# Patient Record
Sex: FEMALE | Race: WHITE | Employment: OTHER | URBAN - METROPOLITAN AREA
[De-identification: names, ages, dates, MRNs, and addresses within clinical notes are randomized per-mention and may not be internally consistent; named-entity substitution may affect disease eponyms.]

---

## 2017-07-14 RX ORDER — ATORVASTATIN CALCIUM 40 MG/1
TABLET, FILM COATED ORAL
Qty: 30 TABLET | Refills: 0 | Status: ON HOLD | OUTPATIENT
Start: 2017-07-14 | End: 2020-04-29 | Stop reason: HOSPADM

## 2019-06-22 ENCOUNTER — HOSPITAL ENCOUNTER (EMERGENCY)
Age: 59
Discharge: HOME | End: 2019-06-22
Attending: EMERGENCY MEDICINE
Payer: COMMERCIAL

## 2019-06-22 ENCOUNTER — APPOINTMENT (OUTPATIENT)
Dept: CT IMAGING | Age: 59
End: 2019-06-22
Payer: COMMERCIAL

## 2019-06-22 VITALS
BODY MASS INDEX: 32.49 KG/M2 | SYSTOLIC BLOOD PRESSURE: 131 MMHG | DIASTOLIC BLOOD PRESSURE: 69 MMHG | HEART RATE: 73 BPM | TEMPERATURE: 98 F | WEIGHT: 220 LBS | RESPIRATION RATE: 18 BRPM | OXYGEN SATURATION: 98 %

## 2019-06-22 DIAGNOSIS — S39.012A STRAIN OF LUMBAR REGION, INITIAL ENCOUNTER: ICD-10-CM
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
## 2019-06-22 DIAGNOSIS — S32.040A CLOSED COMPRESSION FRACTURE OF FOURTH LUMBAR VERTEBRA, INITIAL ENCOUNTER: ICD-10-CM

## 2019-06-22 PROCEDURE — 72131 CT LUMBAR SPINE W/O DYE: CPT

## 2019-06-22 PROCEDURE — 6370000000 HC RX 637 (ALT 250 FOR IP): Performed by: EMERGENCY MEDICINE

## 2019-06-22 PROCEDURE — 99284 EMERGENCY DEPT VISIT MOD MDM: CPT

## 2019-06-22 RX ORDER — IBUPROFEN 400 MG/1
400 TABLET ORAL ONCE
Status: COMPLETED | OUTPATIENT
Start: 2019-06-22 | End: 2019-06-22

## 2019-06-22 RX ORDER — ACETAMINOPHEN 500 MG
1000 TABLET ORAL ONCE
Status: COMPLETED | OUTPATIENT
Start: 2019-06-22 | End: 2019-06-22

## 2019-06-22 RX ADMIN — IBUPROFEN 400 MG: 400 TABLET, FILM COATED ORAL at 11:39

## 2019-06-22 RX ADMIN — ACETAMINOPHEN 1000 MG: 500 TABLET ORAL at 11:38

## 2019-06-22 ASSESSMENT — PAIN SCALES - GENERAL
PAINLEVEL_OUTOF10: 8
PAINLEVEL_OUTOF10: 8

## 2019-06-22 ASSESSMENT — PAIN DESCRIPTION - LOCATION: LOCATION: BACK
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
## 2020-02-21 ENCOUNTER — APPOINTMENT (OUTPATIENT)
Dept: GENERAL RADIOLOGY | Age: 60
End: 2020-02-21
Payer: COMMERCIAL

## 2020-02-21 ENCOUNTER — HOSPITAL ENCOUNTER (EMERGENCY)
Age: 60
Discharge: HOME | End: 2020-02-21
Payer: COMMERCIAL

## 2020-02-21 VITALS
OXYGEN SATURATION: 100 % | RESPIRATION RATE: 16 BRPM | DIASTOLIC BLOOD PRESSURE: 68 MMHG | HEART RATE: 78 BPM | SYSTOLIC BLOOD PRESSURE: 122 MMHG | TEMPERATURE: 98 F

## 2020-02-21 LAB
BACTERIA: ABNORMAL /HPF
BILIRUBIN URINE: NEGATIVE
BLOOD, URINE: ABNORMAL
CLARITY: CLEAR
COLOR: YELLOW
EPITHELIAL CELLS, UA: ABNORMAL /HPF (ref 0–5)
GLUCOSE URINE: NEGATIVE MG/DL
KETONES, URINE: NEGATIVE MG/DL
LEUKOCYTE ESTERASE, URINE: ABNORMAL
MICROSCOPIC EXAMINATION: YES
NITRITE, URINE: NEGATIVE
PH UA: 6 (ref 5–8)
PROTEIN UA: NEGATIVE MG/DL
RBC UA: ABNORMAL /HPF (ref 0–4)
SPECIFIC GRAVITY UA: <=1.005 (ref 1–1.03)
URINE REFLEX TO CULTURE: YES
URINE TYPE: ABNORMAL
UROBILINOGEN, URINE: 0.2 E.U./DL
WBC UA: ABNORMAL /HPF (ref 0–5)

## 2020-02-21 PROCEDURE — 90471 IMMUNIZATION ADMIN: CPT | Performed by: NURSE PRACTITIONER

## 2020-02-21 PROCEDURE — 99283 EMERGENCY DEPT VISIT LOW MDM: CPT

## 2020-02-21 PROCEDURE — 81001 URINALYSIS AUTO W/SCOPE: CPT

## 2020-02-21 PROCEDURE — 90715 TDAP VACCINE 7 YRS/> IM: CPT | Performed by: NURSE PRACTITIONER

## 2020-02-21 PROCEDURE — 71101 X-RAY EXAM UNILAT RIBS/CHEST: CPT

## 2020-02-21 PROCEDURE — 87077 CULTURE AEROBIC IDENTIFY: CPT

## 2020-02-21 PROCEDURE — 6360000002 HC RX W HCPCS: Performed by: NURSE PRACTITIONER

## 2020-02-21 PROCEDURE — 87086 URINE CULTURE/COLONY COUNT: CPT

## 2020-02-21 PROCEDURE — 6370000000 HC RX 637 (ALT 250 FOR IP): Performed by: NURSE PRACTITIONER

## 2020-02-21 PROCEDURE — 73080 X-RAY EXAM OF ELBOW: CPT

## 2020-02-21 RX ORDER — NAPROXEN 500 MG/1
500 TABLET ORAL 2 TIMES DAILY
Qty: 20 TABLET | Refills: 0 | Status: SHIPPED | OUTPATIENT
Start: 2020-02-21 | End: 2020-03-27

## 2020-02-21 RX ORDER — METHOCARBAMOL 500 MG/1
500 TABLET, FILM COATED ORAL ONCE
Status: COMPLETED | OUTPATIENT
Start: 2020-02-21 | End: 2020-02-21

## 2020-02-21 RX ORDER — METHOCARBAMOL 500 MG/1
500 TABLET, FILM COATED ORAL 3 TIMES DAILY
Qty: 15 TABLET | Refills: 0 | Status: SHIPPED | OUTPATIENT
Start: 2020-02-21 | End: 2020-02-26

## 2020-02-21 RX ORDER — ACETAMINOPHEN 325 MG/1
650 TABLET ORAL ONCE
Status: COMPLETED | OUTPATIENT
Start: 2020-02-21 | End: 2020-02-21

## 2020-02-21 RX ADMIN — TETANUS TOXOID, REDUCED DIPHTHERIA TOXOID AND ACELLULAR PERTUSSIS VACCINE, ADSORBED 0.5 ML: 5; 2.5; 8; 8; 2.5 SUSPENSION INTRAMUSCULAR at 20:47

## 2020-02-21 RX ADMIN — ACETAMINOPHEN 650 MG: 325 TABLET ORAL at 19:50

## 2020-02-21 RX ADMIN — METHOCARBAMOL TABLETS 500 MG: 500 TABLET, COATED ORAL at 19:51

## 2020-02-21 ASSESSMENT — ENCOUNTER SYMPTOMS
ABDOMINAL PAIN: 0
COLOR CHANGE: 0
SHORTNESS OF BREATH: 0
SORE THROAT: 0
RHINORRHEA: 0

## 2020-02-21 ASSESSMENT — PAIN SCALES - GENERAL
PAINLEVEL_OUTOF10: 8

## 2020-02-22 LAB
ORGANISM: ABNORMAL
URINE CULTURE, ROUTINE: ABNORMAL
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
## 2020-03-25 NOTE — PROGRESS NOTES
to blood vessels or nerves; death, brain damage, or paralysis. I understand that if I have a Limitation of Treatment order in effect during my hospitalization, the order may or may not be in effect during this procedure. I give my doctor permission to give me blood or blood products. I understand that there are risks with receiving blood such as hepatitis, AIDS, fever, or allergic reaction. I acknowledge that the risks, benefits, and alternatives of this treatment have been explained to me and that no express or implied warranty has been given by the hospital, any blood bank, or any person or entity as to the blood or blood components transfused. At the discretion of my doctor, I agree to allow observers, equipment/product representatives and allow photographing, and/or televising of the procedure, provided my name or identity is maintained confidentially. I agree the hospital may dispose of or use for scientific or educational purposes any tissue, fluid, or body parts which may be removed.     ________________________________Date________Time______ am/pm  ([place] [facility])  Patient or Signature of Closest Relative or Legal Guardian    ________________________________Date________Time______am/pm      Page 1 of  1  Witness

## 2020-03-27 ENCOUNTER — HOSPITAL ENCOUNTER (OUTPATIENT)
Dept: PREADMISSION TESTING | Age: 60
Discharge: HOME | End: 2020-03-31
Payer: COMMERCIAL

## 2020-03-27 ENCOUNTER — HOSPITAL ENCOUNTER (OUTPATIENT)
Dept: GENERAL RADIOLOGY | Age: 60
Discharge: HOME | End: 2020-03-27
Payer: COMMERCIAL

## 2020-03-27 VITALS
HEIGHT: 69 IN | DIASTOLIC BLOOD PRESSURE: 73 MMHG | WEIGHT: 186 LBS | BODY MASS INDEX: 27.55 KG/M2 | TEMPERATURE: 98.1 F | SYSTOLIC BLOOD PRESSURE: 119 MMHG | RESPIRATION RATE: 16 BRPM | HEART RATE: 67 BPM | OXYGEN SATURATION: 98 %

## 2020-03-27 LAB
A/G RATIO: 1.2 (ref 1.1–2.2)
ABO/RH: NORMAL
ALBUMIN SERPL-MCNC: 3.9 G/DL (ref 3.4–5)
ALP BLD-CCNC: 159 U/L (ref 40–129)
ALT SERPL-CCNC: 22 U/L (ref 10–40)
ANION GAP SERPL CALCULATED.3IONS-SCNC: 13 MMOL/L (ref 3–16)
ANTIBODY SCREEN: NORMAL
AST SERPL-CCNC: 24 U/L (ref 15–37)
BILIRUB SERPL-MCNC: <0.2 MG/DL (ref 0–1)
BUN BLDV-MCNC: 16 MG/DL (ref 7–20)
CALCIUM SERPL-MCNC: 9.3 MG/DL (ref 8.3–10.6)
CHLORIDE BLD-SCNC: 99 MMOL/L (ref 99–110)
CO2: 23 MMOL/L (ref 21–32)
CREAT SERPL-MCNC: 0.5 MG/DL (ref 0.6–1.1)
GFR AFRICAN AMERICAN: >60
GFR NON-AFRICAN AMERICAN: >60
GLOBULIN: 3.2 G/DL
GLUCOSE BLD-MCNC: 155 MG/DL (ref 70–99)
HCT VFR BLD CALC: 39 % (ref 36–48)
HEMOGLOBIN: 13 G/DL (ref 12–16)
MCH RBC QN AUTO: 30 PG (ref 26–34)
MCHC RBC AUTO-ENTMCNC: 33.4 G/DL (ref 31–36)
MCV RBC AUTO: 89.9 FL (ref 80–100)
PDW BLD-RTO: 14.8 % (ref 12.4–15.4)
PLATELET # BLD: 310 K/UL (ref 135–450)
PMV BLD AUTO: 8.6 FL (ref 5–10.5)
POTASSIUM SERPL-SCNC: 4.3 MMOL/L (ref 3.5–5.1)
RBC # BLD: 4.33 M/UL (ref 4–5.2)
SODIUM BLD-SCNC: 135 MMOL/L (ref 136–145)
TOTAL PROTEIN: 7.1 G/DL (ref 6.4–8.2)
WBC # BLD: 7.7 K/UL (ref 4–11)

## 2020-03-27 PROCEDURE — 85027 COMPLETE CBC AUTOMATED: CPT

## 2020-03-27 PROCEDURE — 86900 BLOOD TYPING SEROLOGIC ABO: CPT

## 2020-03-27 PROCEDURE — 86901 BLOOD TYPING SEROLOGIC RH(D): CPT

## 2020-03-27 PROCEDURE — 80053 COMPREHEN METABOLIC PANEL: CPT

## 2020-03-27 PROCEDURE — 71046 X-RAY EXAM CHEST 2 VIEWS: CPT

## 2020-03-27 PROCEDURE — 86850 RBC ANTIBODY SCREEN: CPT

## 2020-03-27 NOTE — PROGRESS NOTES
your family will be given written instructions about your diet, activity, dressing care, medications, and return visits. 4. Once at home, should issues with nausea, pain, or bleeding occur, or should you notice any signs of infection, you should call your surgeon. 5. Always clean your hands before and after caring for your wound. Do not let your family touch your surgery site without cleaning their hands. 6. Narcotic pain medications can cause significant constipation. You may want to add a stool softener to your postoperative medication schedule or speak to your surgeon on how best to manage this SIDE EFFECT. SPECIAL INSTRUCTIONS     Thank you for allowing us to care for you. We strive to exceed your expectations in the overall delivery of care and service provided to you and your family. If you need to contact us for any reason, please call us at [phone]. Instructions reviewed and copy given to patient during preadmission testing visit. Lore Rowan. 3/27/2020 .10:06 AM      ADDITIONAL EDUCATIONAL INFORMATION REVIEWED / PROVIDED TO YOU AND YOUR FAMILY:  Yes Taking Control of Your Pain   Yes FAQs about Surgical Site Infections    No Cardiac Surgery Instructions for AM admission to the hospital  No Bactroban® Nasal Ointment Instructions for Cardiac Surgery  No Learning About Preventing Pressure Sores  No Cardiac Surgery Preoperative Hibiclens® Bathing Instructions  No Your Care after Heart Surgery Binder    No Navarro® Wipes Bathing Instructions (Obtained from:  [email or web address].[email or web address] pdf )  No Hibiclens® Bathing Instructions  Yes Antibacterial Soap

## 2020-03-30 ENCOUNTER — HOSPITAL ENCOUNTER (OUTPATIENT)
Dept: CT IMAGING | Age: 60
Discharge: HOME | End: 2020-03-30
Payer: COMMERCIAL

## 2020-03-30 PROCEDURE — 6360000004 HC RX CONTRAST MEDICATION: Performed by: OBSTETRICS & GYNECOLOGY

## 2020-03-30 PROCEDURE — 74177 CT ABD & PELVIS W/CONTRAST: CPT

## 2020-03-30 RX ORDER — CEFAZOLIN SODIUM 2 G/50ML
2 SOLUTION INTRAVENOUS ONCE
Status: CANCELLED | OUTPATIENT
Start: 2020-04-08 | End: 2020-03-30

## 2020-03-30 RX ADMIN — IOPAMIDOL 75 ML: 755 INJECTION, SOLUTION INTRAVENOUS at 08:02

## 2020-03-30 RX ADMIN — IOHEXOL 50 ML: 240 INJECTION, SOLUTION INTRATHECAL; INTRAVASCULAR; INTRAVENOUS; ORAL at 08:01

## 2020-04-07 ENCOUNTER — ANESTHESIA EVENT (OUTPATIENT)
Dept: OPERATING ROOM | Age: 60
DRG: 734 | End: 2020-04-07
Payer: COMMERCIAL

## 2020-04-08 ENCOUNTER — HOSPITAL ENCOUNTER (INPATIENT)
Age: 60
LOS: 7 days | Discharge: NURSING, REHAB OR LONG-TERM CARE | DRG: 734 | End: 2020-04-15
Attending: OBSTETRICS & GYNECOLOGY | Admitting: OBSTETRICS & GYNECOLOGY
Payer: COMMERCIAL

## 2020-04-08 ENCOUNTER — ANESTHESIA (OUTPATIENT)
Dept: OPERATING ROOM | Age: 60
DRG: 734 | End: 2020-04-08
Payer: COMMERCIAL

## 2020-04-08 VITALS — OXYGEN SATURATION: 100 % | DIASTOLIC BLOOD PRESSURE: 72 MMHG | TEMPERATURE: 95.2 F | SYSTOLIC BLOOD PRESSURE: 127 MMHG

## 2020-04-08 PROBLEM — C56.2 OVARIAN CANCER ON LEFT (HCC): Status: ACTIVE | Noted: 2020-04-08

## 2020-04-08 LAB
ABO/RH: NORMAL
ANTIBODY SCREEN: NORMAL

## 2020-04-08 PROCEDURE — 88304 TISSUE EXAM BY PATHOLOGIST: CPT

## 2020-04-08 PROCEDURE — 6360000002 HC RX W HCPCS: Performed by: OBSTETRICS & GYNECOLOGY

## 2020-04-08 PROCEDURE — 7100000000 HC PACU RECOVERY - FIRST 15 MIN: Performed by: OBSTETRICS & GYNECOLOGY

## 2020-04-08 PROCEDURE — C9113 INJ PANTOPRAZOLE SODIUM, VIA: HCPCS | Performed by: OBSTETRICS & GYNECOLOGY

## 2020-04-08 PROCEDURE — 88309 TISSUE EXAM BY PATHOLOGIST: CPT

## 2020-04-08 PROCEDURE — 2580000003 HC RX 258: Performed by: OBSTETRICS & GYNECOLOGY

## 2020-04-08 PROCEDURE — 2720000010 HC SURG SUPPLY STERILE: Performed by: OBSTETRICS & GYNECOLOGY

## 2020-04-08 PROCEDURE — 3700000000 HC ANESTHESIA ATTENDED CARE: Performed by: OBSTETRICS & GYNECOLOGY

## 2020-04-08 PROCEDURE — 88307 TISSUE EXAM BY PATHOLOGIST: CPT

## 2020-04-08 PROCEDURE — 0DBU0ZZ EXCISION OF OMENTUM, OPEN APPROACH: ICD-10-PCS | Performed by: OBSTETRICS & GYNECOLOGY

## 2020-04-08 PROCEDURE — 86900 BLOOD TYPING SEROLOGIC ABO: CPT

## 2020-04-08 PROCEDURE — 2500000003 HC RX 250 WO HCPCS: Performed by: ANESTHESIOLOGY

## 2020-04-08 PROCEDURE — 2580000003 HC RX 258: Performed by: ANESTHESIOLOGY

## 2020-04-08 PROCEDURE — 88342 IMHCHEM/IMCYTCHM 1ST ANTB: CPT

## 2020-04-08 PROCEDURE — 0UT20ZZ RESECTION OF BILATERAL OVARIES, OPEN APPROACH: ICD-10-PCS | Performed by: OBSTETRICS & GYNECOLOGY

## 2020-04-08 PROCEDURE — 88341 IMHCHEM/IMCYTCHM EA ADD ANTB: CPT

## 2020-04-08 PROCEDURE — 86850 RBC ANTIBODY SCREEN: CPT

## 2020-04-08 PROCEDURE — 0UB70ZZ EXCISION OF BILATERAL FALLOPIAN TUBES, OPEN APPROACH: ICD-10-PCS | Performed by: OBSTETRICS & GYNECOLOGY

## 2020-04-08 PROCEDURE — 6370000000 HC RX 637 (ALT 250 FOR IP): Performed by: OBSTETRICS & GYNECOLOGY

## 2020-04-08 PROCEDURE — 3600000019 HC SURGERY ROBOT ADDTL 15MIN: Performed by: OBSTETRICS & GYNECOLOGY

## 2020-04-08 PROCEDURE — 94770 HC ETCO2 MONITOR DAILY: CPT

## 2020-04-08 PROCEDURE — 0DBW0ZZ EXCISION OF PERITONEUM, OPEN APPROACH: ICD-10-PCS | Performed by: OBSTETRICS & GYNECOLOGY

## 2020-04-08 PROCEDURE — C9290 INJ, BUPIVACAINE LIPOSOME: HCPCS | Performed by: ANESTHESIOLOGY

## 2020-04-08 PROCEDURE — 88112 CYTOPATH CELL ENHANCE TECH: CPT

## 2020-04-08 PROCEDURE — 86901 BLOOD TYPING SEROLOGIC RH(D): CPT

## 2020-04-08 PROCEDURE — 3600000009 HC SURGERY ROBOT BASE: Performed by: OBSTETRICS & GYNECOLOGY

## 2020-04-08 PROCEDURE — 6360000002 HC RX W HCPCS: Performed by: NURSE ANESTHETIST, CERTIFIED REGISTERED

## 2020-04-08 PROCEDURE — C1765 ADHESION BARRIER: HCPCS | Performed by: OBSTETRICS & GYNECOLOGY

## 2020-04-08 PROCEDURE — 6360000002 HC RX W HCPCS: Performed by: ANESTHESIOLOGY

## 2020-04-08 PROCEDURE — 2709999900 HC NON-CHARGEABLE SUPPLY: Performed by: OBSTETRICS & GYNECOLOGY

## 2020-04-08 PROCEDURE — 0UT90ZZ RESECTION OF UTERUS, OPEN APPROACH: ICD-10-PCS | Performed by: OBSTETRICS & GYNECOLOGY

## 2020-04-08 PROCEDURE — 7100000001 HC PACU RECOVERY - ADDTL 15 MIN: Performed by: OBSTETRICS & GYNECOLOGY

## 2020-04-08 PROCEDURE — 3700000001 HC ADD 15 MINUTES (ANESTHESIA): Performed by: OBSTETRICS & GYNECOLOGY

## 2020-04-08 PROCEDURE — 1200000000 HC SEMI PRIVATE

## 2020-04-08 PROCEDURE — S2900 ROBOTIC SURGICAL SYSTEM: HCPCS | Performed by: OBSTETRICS & GYNECOLOGY

## 2020-04-08 PROCEDURE — 88305 TISSUE EXAM BY PATHOLOGIST: CPT

## 2020-04-08 PROCEDURE — 2500000003 HC RX 250 WO HCPCS: Performed by: NURSE ANESTHETIST, CERTIFIED REGISTERED

## 2020-04-08 PROCEDURE — 0WJG4ZZ INSPECTION OF PERITONEAL CAVITY, PERCUTANEOUS ENDOSCOPIC APPROACH: ICD-10-PCS | Performed by: OBSTETRICS & GYNECOLOGY

## 2020-04-08 PROCEDURE — 2500000003 HC RX 250 WO HCPCS: Performed by: OBSTETRICS & GYNECOLOGY

## 2020-04-08 PROCEDURE — 64488 TAP BLOCK BI INJECTION: CPT | Performed by: ANESTHESIOLOGY

## 2020-04-08 PROCEDURE — 0DTJ0ZZ RESECTION OF APPENDIX, OPEN APPROACH: ICD-10-PCS | Performed by: OBSTETRICS & GYNECOLOGY

## 2020-04-08 PROCEDURE — 07TC0ZZ RESECTION OF PELVIS LYMPHATIC, OPEN APPROACH: ICD-10-PCS | Performed by: OBSTETRICS & GYNECOLOGY

## 2020-04-08 PROCEDURE — 3E0T3BZ INTRODUCTION OF ANESTHETIC AGENT INTO PERIPHERAL NERVES AND PLEXI, PERCUTANEOUS APPROACH: ICD-10-PCS | Performed by: ANESTHESIOLOGY

## 2020-04-08 RX ORDER — MORPHINE SULFATE/PF 50 MG/50ML
PATIENT CONTROLLED ANALGESIA SYRINGE INTRAVENOUS CONTINUOUS
Status: DISCONTINUED | OUTPATIENT
Start: 2020-04-08 | End: 2020-04-10

## 2020-04-08 RX ORDER — MEPERIDINE HYDROCHLORIDE 25 MG/ML
12.5 INJECTION INTRAMUSCULAR; INTRAVENOUS; SUBCUTANEOUS EVERY 5 MIN PRN
Status: DISCONTINUED | OUTPATIENT
Start: 2020-04-08 | End: 2020-04-08 | Stop reason: HOSPADM

## 2020-04-08 RX ORDER — SUCCINYLCHOLINE/SOD CL,ISO/PF 200MG/10ML
SYRINGE (ML) INTRAVENOUS PRN
Status: DISCONTINUED | OUTPATIENT
Start: 2020-04-08 | End: 2020-04-08 | Stop reason: SDUPTHER

## 2020-04-08 RX ORDER — DIPHENHYDRAMINE HYDROCHLORIDE 50 MG/ML
12.5 INJECTION INTRAMUSCULAR; INTRAVENOUS EVERY 6 HOURS PRN
Status: DISCONTINUED | OUTPATIENT
Start: 2020-04-08 | End: 2020-04-15 | Stop reason: HOSPADM

## 2020-04-08 RX ORDER — CEFAZOLIN SODIUM 2 G/50ML
2 SOLUTION INTRAVENOUS ONCE
Status: COMPLETED | OUTPATIENT
Start: 2020-04-08 | End: 2020-04-08

## 2020-04-08 RX ORDER — ACETAMINOPHEN 325 MG/1
650 TABLET ORAL EVERY 4 HOURS PRN
Status: DISCONTINUED | OUTPATIENT
Start: 2020-04-08 | End: 2020-04-15 | Stop reason: HOSPADM

## 2020-04-08 RX ORDER — OXYCODONE HYDROCHLORIDE 5 MG/1
10 TABLET ORAL EVERY 4 HOURS PRN
Status: DISCONTINUED | OUTPATIENT
Start: 2020-04-08 | End: 2020-04-15 | Stop reason: HOSPADM

## 2020-04-08 RX ORDER — SODIUM CHLORIDE 0.9 % (FLUSH) 0.9 %
10 SYRINGE (ML) INJECTION PRN
Status: DISCONTINUED | OUTPATIENT
Start: 2020-04-08 | End: 2020-04-08 | Stop reason: HOSPADM

## 2020-04-08 RX ORDER — GABAPENTIN 100 MG/1
100 CAPSULE ORAL 3 TIMES DAILY
Status: DISCONTINUED | OUTPATIENT
Start: 2020-04-09 | End: 2020-04-15 | Stop reason: HOSPADM

## 2020-04-08 RX ORDER — 0.9 % SODIUM CHLORIDE 0.9 %
500 INTRAVENOUS SOLUTION INTRAVENOUS
Status: DISCONTINUED | OUTPATIENT
Start: 2020-04-08 | End: 2020-04-08 | Stop reason: HOSPADM

## 2020-04-08 RX ORDER — SODIUM CHLORIDE, SODIUM LACTATE, POTASSIUM CHLORIDE, CALCIUM CHLORIDE 600; 310; 30; 20 MG/100ML; MG/100ML; MG/100ML; MG/100ML
INJECTION, SOLUTION INTRAVENOUS CONTINUOUS
Status: DISCONTINUED | OUTPATIENT
Start: 2020-04-08 | End: 2020-04-08

## 2020-04-08 RX ORDER — ASPIRIN 81 MG/1
81 TABLET ORAL DAILY
Status: DISCONTINUED | OUTPATIENT
Start: 2020-04-09 | End: 2020-04-15 | Stop reason: HOSPADM

## 2020-04-08 RX ORDER — BUPIVACAINE HYDROCHLORIDE 5 MG/ML
INJECTION, SOLUTION EPIDURAL; INTRACAUDAL
Status: DISCONTINUED | OUTPATIENT
Start: 2020-04-08 | End: 2020-04-08 | Stop reason: SDUPTHER

## 2020-04-08 RX ORDER — HYDROCODONE BITARTRATE AND ACETAMINOPHEN 5; 325 MG/1; MG/1
1 TABLET ORAL
Status: DISCONTINUED | OUTPATIENT
Start: 2020-04-08 | End: 2020-04-08 | Stop reason: HOSPADM

## 2020-04-08 RX ORDER — BUPIVACAINE HYDROCHLORIDE 2.5 MG/ML
INJECTION, SOLUTION EPIDURAL; INFILTRATION; INTRACAUDAL PRN
Status: DISCONTINUED | OUTPATIENT
Start: 2020-04-08 | End: 2020-04-08 | Stop reason: HOSPADM

## 2020-04-08 RX ORDER — SODIUM CHLORIDE 9 MG/ML
INJECTION, SOLUTION INTRAVENOUS CONTINUOUS
Status: DISCONTINUED | OUTPATIENT
Start: 2020-04-08 | End: 2020-04-08

## 2020-04-08 RX ORDER — PANTOPRAZOLE SODIUM 40 MG/10ML
40 INJECTION, POWDER, LYOPHILIZED, FOR SOLUTION INTRAVENOUS DAILY
Status: DISCONTINUED | OUTPATIENT
Start: 2020-04-08 | End: 2020-04-11 | Stop reason: ALTCHOICE

## 2020-04-08 RX ORDER — METOCLOPRAMIDE HYDROCHLORIDE 5 MG/ML
5 INJECTION INTRAMUSCULAR; INTRAVENOUS EVERY 6 HOURS PRN
Status: DISCONTINUED | OUTPATIENT
Start: 2020-04-08 | End: 2020-04-15 | Stop reason: HOSPADM

## 2020-04-08 RX ORDER — GLYCOPYRROLATE 1 MG/5 ML
SYRINGE (ML) INTRAVENOUS PRN
Status: DISCONTINUED | OUTPATIENT
Start: 2020-04-08 | End: 2020-04-08 | Stop reason: SDUPTHER

## 2020-04-08 RX ORDER — PROCHLORPERAZINE EDISYLATE 5 MG/ML
5 INJECTION INTRAMUSCULAR; INTRAVENOUS
Status: COMPLETED | OUTPATIENT
Start: 2020-04-08 | End: 2020-04-08

## 2020-04-08 RX ORDER — SODIUM CHLORIDE 0.9 % (FLUSH) 0.9 %
10 SYRINGE (ML) INJECTION EVERY 12 HOURS SCHEDULED
Status: DISCONTINUED | OUTPATIENT
Start: 2020-04-08 | End: 2020-04-15 | Stop reason: HOSPADM

## 2020-04-08 RX ORDER — MORPHINE SULFATE 2 MG/ML
2 INJECTION, SOLUTION INTRAMUSCULAR; INTRAVENOUS
Status: DISCONTINUED | OUTPATIENT
Start: 2020-04-08 | End: 2020-04-15 | Stop reason: HOSPADM

## 2020-04-08 RX ORDER — NALOXONE HYDROCHLORIDE 0.4 MG/ML
0.4 INJECTION, SOLUTION INTRAMUSCULAR; INTRAVENOUS; SUBCUTANEOUS PRN
Status: DISCONTINUED | OUTPATIENT
Start: 2020-04-08 | End: 2020-04-10

## 2020-04-08 RX ORDER — CALCIUM CARBONATE 200(500)MG
500 TABLET,CHEWABLE ORAL 3 TIMES DAILY PRN
Status: DISCONTINUED | OUTPATIENT
Start: 2020-04-08 | End: 2020-04-15 | Stop reason: HOSPADM

## 2020-04-08 RX ORDER — OXYCODONE HYDROCHLORIDE 5 MG/1
5 TABLET ORAL EVERY 4 HOURS PRN
Status: DISCONTINUED | OUTPATIENT
Start: 2020-04-08 | End: 2020-04-15 | Stop reason: HOSPADM

## 2020-04-08 RX ORDER — PROPOFOL 10 MG/ML
INJECTION, EMULSION INTRAVENOUS PRN
Status: DISCONTINUED | OUTPATIENT
Start: 2020-04-08 | End: 2020-04-08 | Stop reason: SDUPTHER

## 2020-04-08 RX ORDER — SODIUM CHLORIDE 0.9 % (FLUSH) 0.9 %
10 SYRINGE (ML) INJECTION PRN
Status: DISCONTINUED | OUTPATIENT
Start: 2020-04-08 | End: 2020-04-15 | Stop reason: HOSPADM

## 2020-04-08 RX ORDER — FENTANYL CITRATE 50 UG/ML
INJECTION, SOLUTION INTRAMUSCULAR; INTRAVENOUS PRN
Status: DISCONTINUED | OUTPATIENT
Start: 2020-04-08 | End: 2020-04-08 | Stop reason: SDUPTHER

## 2020-04-08 RX ORDER — GABAPENTIN 100 MG/1
100 CAPSULE ORAL 3 TIMES DAILY
Status: ON HOLD | COMMUNITY
End: 2020-04-29 | Stop reason: SDUPTHER

## 2020-04-08 RX ORDER — WATER 1000 ML/1000ML
INJECTION, SOLUTION INTRAVENOUS PRN
Status: DISCONTINUED | OUTPATIENT
Start: 2020-04-08 | End: 2020-04-08 | Stop reason: HOSPADM

## 2020-04-08 RX ORDER — SENNA AND DOCUSATE SODIUM 50; 8.6 MG/1; MG/1
1 TABLET, FILM COATED ORAL 2 TIMES DAILY
Status: DISCONTINUED | OUTPATIENT
Start: 2020-04-08 | End: 2020-04-15 | Stop reason: HOSPADM

## 2020-04-08 RX ORDER — LIDOCAINE HYDROCHLORIDE 20 MG/ML
INJECTION, SOLUTION INTRAVENOUS PRN
Status: DISCONTINUED | OUTPATIENT
Start: 2020-04-08 | End: 2020-04-08 | Stop reason: SDUPTHER

## 2020-04-08 RX ORDER — MAGNESIUM HYDROXIDE 1200 MG/15ML
LIQUID ORAL CONTINUOUS PRN
Status: COMPLETED | OUTPATIENT
Start: 2020-04-08 | End: 2020-04-08

## 2020-04-08 RX ORDER — ONDANSETRON 2 MG/ML
4 INJECTION INTRAMUSCULAR; INTRAVENOUS EVERY 8 HOURS PRN
Status: DISCONTINUED | OUTPATIENT
Start: 2020-04-08 | End: 2020-04-15 | Stop reason: HOSPADM

## 2020-04-08 RX ORDER — HYDROMORPHONE HCL 110MG/55ML
PATIENT CONTROLLED ANALGESIA SYRINGE INTRAVENOUS PRN
Status: DISCONTINUED | OUTPATIENT
Start: 2020-04-08 | End: 2020-04-08 | Stop reason: SDUPTHER

## 2020-04-08 RX ORDER — MORPHINE SULFATE 2 MG/ML
4 INJECTION, SOLUTION INTRAMUSCULAR; INTRAVENOUS
Status: DISCONTINUED | OUTPATIENT
Start: 2020-04-08 | End: 2020-04-15 | Stop reason: HOSPADM

## 2020-04-08 RX ORDER — ONDANSETRON 2 MG/ML
4 INJECTION INTRAMUSCULAR; INTRAVENOUS
Status: DISCONTINUED | OUTPATIENT
Start: 2020-04-08 | End: 2020-04-08 | Stop reason: HOSPADM

## 2020-04-08 RX ORDER — HYDRALAZINE HYDROCHLORIDE 20 MG/ML
5 INJECTION INTRAMUSCULAR; INTRAVENOUS EVERY 10 MIN PRN
Status: DISCONTINUED | OUTPATIENT
Start: 2020-04-08 | End: 2020-04-08 | Stop reason: HOSPADM

## 2020-04-08 RX ORDER — SODIUM CHLORIDE 0.9 % (FLUSH) 0.9 %
10 SYRINGE (ML) INJECTION EVERY 12 HOURS SCHEDULED
Status: DISCONTINUED | OUTPATIENT
Start: 2020-04-08 | End: 2020-04-08 | Stop reason: HOSPADM

## 2020-04-08 RX ORDER — ROCURONIUM BROMIDE 10 MG/ML
INJECTION, SOLUTION INTRAVENOUS PRN
Status: DISCONTINUED | OUTPATIENT
Start: 2020-04-08 | End: 2020-04-08 | Stop reason: SDUPTHER

## 2020-04-08 RX ORDER — ATORVASTATIN CALCIUM 40 MG/1
40 TABLET, FILM COATED ORAL NIGHTLY
Status: DISCONTINUED | OUTPATIENT
Start: 2020-04-09 | End: 2020-04-15 | Stop reason: HOSPADM

## 2020-04-08 RX ORDER — NITROGLYCERIN 0.4 MG/1
0.4 TABLET SUBLINGUAL EVERY 5 MIN PRN
Status: DISCONTINUED | OUTPATIENT
Start: 2020-04-08 | End: 2020-04-15 | Stop reason: HOSPADM

## 2020-04-08 RX ORDER — ONDANSETRON 2 MG/ML
INJECTION INTRAMUSCULAR; INTRAVENOUS PRN
Status: DISCONTINUED | OUTPATIENT
Start: 2020-04-08 | End: 2020-04-08 | Stop reason: SDUPTHER

## 2020-04-08 RX ORDER — KETOROLAC TROMETHAMINE 30 MG/ML
15 INJECTION, SOLUTION INTRAMUSCULAR; INTRAVENOUS EVERY 6 HOURS
Status: COMPLETED | OUTPATIENT
Start: 2020-04-09 | End: 2020-04-10

## 2020-04-08 RX ORDER — DIPHENHYDRAMINE HYDROCHLORIDE 50 MG/ML
12.5 INJECTION INTRAMUSCULAR; INTRAVENOUS
Status: DISCONTINUED | OUTPATIENT
Start: 2020-04-08 | End: 2020-04-08 | Stop reason: HOSPADM

## 2020-04-08 RX ORDER — SODIUM CHLORIDE 9 MG/ML
INJECTION, SOLUTION INTRAVENOUS CONTINUOUS
Status: DISCONTINUED | OUTPATIENT
Start: 2020-04-08 | End: 2020-04-15 | Stop reason: HOSPADM

## 2020-04-08 RX ADMIN — BUPIVACAINE 20 ML: 13.3 INJECTION, SUSPENSION, LIPOSOMAL INFILTRATION at 18:30

## 2020-04-08 RX ADMIN — SUGAMMADEX 200 MG: 100 INJECTION, SOLUTION INTRAVENOUS at 17:59

## 2020-04-08 RX ADMIN — Medication 140 MG: at 13:17

## 2020-04-08 RX ADMIN — SODIUM CHLORIDE, SODIUM LACTATE, POTASSIUM CHLORIDE, AND CALCIUM CHLORIDE: 600; 310; 30; 20 INJECTION, SOLUTION INTRAVENOUS at 09:10

## 2020-04-08 RX ADMIN — CEFAZOLIN SODIUM 2 G: 2 SOLUTION INTRAVENOUS at 17:23

## 2020-04-08 RX ADMIN — FENTANYL CITRATE 50 MCG: 50 INJECTION INTRAMUSCULAR; INTRAVENOUS at 13:54

## 2020-04-08 RX ADMIN — SODIUM CHLORIDE, SODIUM LACTATE, POTASSIUM CHLORIDE, AND CALCIUM CHLORIDE: 600; 310; 30; 20 INJECTION, SOLUTION INTRAVENOUS at 15:38

## 2020-04-08 RX ADMIN — SODIUM CHLORIDE, SODIUM LACTATE, POTASSIUM CHLORIDE, AND CALCIUM CHLORIDE: 600; 310; 30; 20 INJECTION, SOLUTION INTRAVENOUS at 16:30

## 2020-04-08 RX ADMIN — ROCURONIUM BROMIDE 40 MG: 10 INJECTION, SOLUTION INTRAVENOUS at 13:54

## 2020-04-08 RX ADMIN — ROCURONIUM BROMIDE 5 MG: 10 INJECTION, SOLUTION INTRAVENOUS at 13:17

## 2020-04-08 RX ADMIN — CEFAZOLIN SODIUM 2 G: 2 SOLUTION INTRAVENOUS at 13:27

## 2020-04-08 RX ADMIN — PROPOFOL 150 MG: 10 INJECTION, EMULSION INTRAVENOUS at 13:17

## 2020-04-08 RX ADMIN — FENTANYL CITRATE 50 MCG: 50 INJECTION INTRAMUSCULAR; INTRAVENOUS at 15:04

## 2020-04-08 RX ADMIN — PANTOPRAZOLE SODIUM 40 MG: 40 INJECTION, POWDER, FOR SOLUTION INTRAVENOUS at 21:01

## 2020-04-08 RX ADMIN — ROCURONIUM BROMIDE 55 MG: 10 INJECTION, SOLUTION INTRAVENOUS at 13:34

## 2020-04-08 RX ADMIN — MORPHINE SULFATE 1 MG: 1 INJECTION, SOLUTION INTRAVENOUS at 19:06

## 2020-04-08 RX ADMIN — SODIUM CHLORIDE: 9 INJECTION, SOLUTION INTRAVENOUS at 18:56

## 2020-04-08 RX ADMIN — SODIUM CHLORIDE, SODIUM LACTATE, POTASSIUM CHLORIDE, AND CALCIUM CHLORIDE: 600; 310; 30; 20 INJECTION, SOLUTION INTRAVENOUS at 09:30

## 2020-04-08 RX ADMIN — HYDROMORPHONE HYDROCHLORIDE 0.5 MG: 1 INJECTION, SOLUTION INTRAMUSCULAR; INTRAVENOUS; SUBCUTANEOUS at 18:58

## 2020-04-08 RX ADMIN — HYDROMORPHONE HYDROCHLORIDE 0.5 MG: 1 INJECTION, SOLUTION INTRAMUSCULAR; INTRAVENOUS; SUBCUTANEOUS at 18:24

## 2020-04-08 RX ADMIN — Medication 0.2 MG: at 13:11

## 2020-04-08 RX ADMIN — FENTANYL CITRATE 50 MCG: 50 INJECTION INTRAMUSCULAR; INTRAVENOUS at 13:34

## 2020-04-08 RX ADMIN — FENTANYL CITRATE 50 MCG: 50 INJECTION INTRAMUSCULAR; INTRAVENOUS at 14:41

## 2020-04-08 RX ADMIN — SODIUM CHLORIDE, SODIUM LACTATE, POTASSIUM CHLORIDE, AND CALCIUM CHLORIDE: 600; 310; 30; 20 INJECTION, SOLUTION INTRAVENOUS at 14:02

## 2020-04-08 RX ADMIN — ROCURONIUM BROMIDE 30 MG: 10 INJECTION, SOLUTION INTRAVENOUS at 15:37

## 2020-04-08 RX ADMIN — HYDROMORPHONE HYDROCHLORIDE 1.5 MG: 2 INJECTION, SOLUTION INTRAMUSCULAR; INTRAVENOUS; SUBCUTANEOUS at 15:40

## 2020-04-08 RX ADMIN — Medication 10 ML: at 21:29

## 2020-04-08 RX ADMIN — FENTANYL CITRATE 50 MCG: 50 INJECTION INTRAMUSCULAR; INTRAVENOUS at 14:21

## 2020-04-08 RX ADMIN — HYDROMORPHONE HYDROCHLORIDE 0.5 MG: 2 INJECTION, SOLUTION INTRAMUSCULAR; INTRAVENOUS; SUBCUTANEOUS at 15:06

## 2020-04-08 RX ADMIN — FENTANYL CITRATE 50 MCG: 50 INJECTION INTRAMUSCULAR; INTRAVENOUS at 13:11

## 2020-04-08 RX ADMIN — SODIUM CHLORIDE: 9 INJECTION, SOLUTION INTRAVENOUS at 21:01

## 2020-04-08 RX ADMIN — LIDOCAINE HYDROCHLORIDE 80 MG: 20 INJECTION, SOLUTION INTRAVENOUS at 13:17

## 2020-04-08 RX ADMIN — SODIUM CHLORIDE, SODIUM LACTATE, POTASSIUM CHLORIDE, AND CALCIUM CHLORIDE: 600; 310; 30; 20 INJECTION, SOLUTION INTRAVENOUS at 14:31

## 2020-04-08 RX ADMIN — ONDANSETRON 4 MG: 2 INJECTION INTRAMUSCULAR; INTRAVENOUS at 17:02

## 2020-04-08 RX ADMIN — SENNOSIDES AND DOCUSATE SODIUM 1 TABLET: 8.6; 5 TABLET ORAL at 21:01

## 2020-04-08 RX ADMIN — PROCHLORPERAZINE EDISYLATE 5 MG: 5 INJECTION INTRAMUSCULAR; INTRAVENOUS at 18:55

## 2020-04-08 RX ADMIN — BUPIVACAINE HYDROCHLORIDE 30 ML: 5 INJECTION, SOLUTION EPIDURAL; INTRACAUDAL; PERINEURAL at 18:30

## 2020-04-08 ASSESSMENT — PULMONARY FUNCTION TESTS
PIF_VALUE: 18
PIF_VALUE: 22
PIF_VALUE: 23
PIF_VALUE: 20
PIF_VALUE: 20
PIF_VALUE: 23
PIF_VALUE: 20
PIF_VALUE: 18
PIF_VALUE: 19
PIF_VALUE: 23
PIF_VALUE: 19
PIF_VALUE: 23
PIF_VALUE: 23
PIF_VALUE: 28
PIF_VALUE: 23
PIF_VALUE: 28
PIF_VALUE: 19
PIF_VALUE: 22
PIF_VALUE: 19
PIF_VALUE: 19
PIF_VALUE: 21
PIF_VALUE: 19
PIF_VALUE: 16
PIF_VALUE: 28
PIF_VALUE: 29
PIF_VALUE: 19
PIF_VALUE: 23
PIF_VALUE: 23
PIF_VALUE: 22
PIF_VALUE: 20
PIF_VALUE: 22
PIF_VALUE: 28
PIF_VALUE: 19
PIF_VALUE: 23
PIF_VALUE: 19
PIF_VALUE: 22
PIF_VALUE: 22
PIF_VALUE: 20
PIF_VALUE: 20
PIF_VALUE: 19
PIF_VALUE: 23
PIF_VALUE: 19
PIF_VALUE: 23
PIF_VALUE: 22
PIF_VALUE: 27
PIF_VALUE: 23
PIF_VALUE: 19
PIF_VALUE: 17
PIF_VALUE: 0
PIF_VALUE: 17
PIF_VALUE: 20
PIF_VALUE: 13
PIF_VALUE: 22
PIF_VALUE: 19
PIF_VALUE: 19
PIF_VALUE: 0
PIF_VALUE: 19
PIF_VALUE: 19
PIF_VALUE: 20
PIF_VALUE: 28
PIF_VALUE: 17
PIF_VALUE: 19
PIF_VALUE: 17
PIF_VALUE: 19
PIF_VALUE: 22
PIF_VALUE: 19
PIF_VALUE: 23
PIF_VALUE: 19
PIF_VALUE: 28
PIF_VALUE: 23
PIF_VALUE: 19
PIF_VALUE: 16
PIF_VALUE: 23
PIF_VALUE: 22
PIF_VALUE: 1
PIF_VALUE: 2
PIF_VALUE: 21
PIF_VALUE: 22
PIF_VALUE: 21
PIF_VALUE: 19
PIF_VALUE: 23
PIF_VALUE: 17
PIF_VALUE: 19
PIF_VALUE: 19
PIF_VALUE: 22
PIF_VALUE: 19
PIF_VALUE: 21
PIF_VALUE: 19
PIF_VALUE: 22
PIF_VALUE: 23
PIF_VALUE: 19
PIF_VALUE: 22
PIF_VALUE: 3
PIF_VALUE: 23
PIF_VALUE: 27
PIF_VALUE: 22
PIF_VALUE: 21
PIF_VALUE: 22
PIF_VALUE: 2
PIF_VALUE: 19
PIF_VALUE: 23
PIF_VALUE: 20
PIF_VALUE: 0
PIF_VALUE: 23
PIF_VALUE: 19
PIF_VALUE: 19
PIF_VALUE: 18
PIF_VALUE: 19
PIF_VALUE: 22
PIF_VALUE: 16
PIF_VALUE: 19
PIF_VALUE: 23
PIF_VALUE: 2
PIF_VALUE: 19
PIF_VALUE: 20
PIF_VALUE: 23
PIF_VALUE: 19
PIF_VALUE: 23
PIF_VALUE: 19
PIF_VALUE: 17
PIF_VALUE: 19
PIF_VALUE: 20
PIF_VALUE: 22
PIF_VALUE: 21
PIF_VALUE: 19
PIF_VALUE: 19
PIF_VALUE: 20
PIF_VALUE: 19
PIF_VALUE: 20
PIF_VALUE: 22
PIF_VALUE: 23
PIF_VALUE: 22
PIF_VALUE: 22
PIF_VALUE: 23
PIF_VALUE: 1
PIF_VALUE: 22
PIF_VALUE: 19
PIF_VALUE: 22
PIF_VALUE: 23
PIF_VALUE: 2
PIF_VALUE: 20
PIF_VALUE: 23
PIF_VALUE: 19
PIF_VALUE: 22
PIF_VALUE: 23
PIF_VALUE: 22
PIF_VALUE: 20
PIF_VALUE: 23
PIF_VALUE: 18
PIF_VALUE: 26
PIF_VALUE: 2
PIF_VALUE: 20
PIF_VALUE: 19
PIF_VALUE: 0
PIF_VALUE: 22
PIF_VALUE: 20
PIF_VALUE: 23
PIF_VALUE: 19
PIF_VALUE: 0
PIF_VALUE: 2
PIF_VALUE: 23
PIF_VALUE: 2
PIF_VALUE: 21
PIF_VALUE: 19
PIF_VALUE: 23
PIF_VALUE: 19
PIF_VALUE: 19
PIF_VALUE: 20
PIF_VALUE: 19
PIF_VALUE: 22
PIF_VALUE: 21
PIF_VALUE: 19
PIF_VALUE: 22
PIF_VALUE: 19
PIF_VALUE: 23
PIF_VALUE: 19
PIF_VALUE: 22
PIF_VALUE: 20
PIF_VALUE: 22
PIF_VALUE: 22
PIF_VALUE: 19
PIF_VALUE: 18
PIF_VALUE: 19
PIF_VALUE: 23
PIF_VALUE: 20
PIF_VALUE: 22
PIF_VALUE: 19
PIF_VALUE: 23
PIF_VALUE: 23
PIF_VALUE: 22
PIF_VALUE: 21
PIF_VALUE: 20
PIF_VALUE: 20
PIF_VALUE: 22
PIF_VALUE: 23
PIF_VALUE: 21
PIF_VALUE: 19
PIF_VALUE: 23
PIF_VALUE: 19
PIF_VALUE: 19
PIF_VALUE: 23
PIF_VALUE: 19
PIF_VALUE: 19
PIF_VALUE: 23
PIF_VALUE: 27
PIF_VALUE: 21
PIF_VALUE: 21
PIF_VALUE: 22
PIF_VALUE: 3
PIF_VALUE: 28
PIF_VALUE: 24
PIF_VALUE: 18
PIF_VALUE: 19
PIF_VALUE: 19
PIF_VALUE: 23
PIF_VALUE: 19
PIF_VALUE: 20
PIF_VALUE: 19
PIF_VALUE: 18
PIF_VALUE: 18
PIF_VALUE: 19
PIF_VALUE: 23
PIF_VALUE: 19
PIF_VALUE: 1
PIF_VALUE: 2
PIF_VALUE: 17
PIF_VALUE: 23
PIF_VALUE: 20
PIF_VALUE: 19
PIF_VALUE: 21
PIF_VALUE: 22
PIF_VALUE: 23
PIF_VALUE: 22
PIF_VALUE: 21
PIF_VALUE: 26
PIF_VALUE: 23
PIF_VALUE: 23
PIF_VALUE: 18
PIF_VALUE: 23
PIF_VALUE: 22
PIF_VALUE: 23
PIF_VALUE: 19
PIF_VALUE: 23
PIF_VALUE: 23
PIF_VALUE: 19
PIF_VALUE: 19
PIF_VALUE: 23
PIF_VALUE: 22
PIF_VALUE: 23
PIF_VALUE: 19
PIF_VALUE: 2
PIF_VALUE: 23
PIF_VALUE: 23
PIF_VALUE: 19
PIF_VALUE: 26
PIF_VALUE: 22
PIF_VALUE: 22
PIF_VALUE: 20
PIF_VALUE: 3
PIF_VALUE: 18
PIF_VALUE: 1
PIF_VALUE: 19
PIF_VALUE: 23
PIF_VALUE: 22
PIF_VALUE: 23
PIF_VALUE: 19
PIF_VALUE: 19
PIF_VALUE: 28
PIF_VALUE: 24
PIF_VALUE: 22
PIF_VALUE: 18
PIF_VALUE: 19
PIF_VALUE: 23
PIF_VALUE: 19
PIF_VALUE: 21
PIF_VALUE: 18
PIF_VALUE: 18
PIF_VALUE: 19
PIF_VALUE: 20

## 2020-04-08 ASSESSMENT — PAIN SCALES - GENERAL
PAINLEVEL_OUTOF10: 5
PAINLEVEL_OUTOF10: 8

## 2020-04-08 ASSESSMENT — PAIN - FUNCTIONAL ASSESSMENT: PAIN_FUNCTIONAL_ASSESSMENT: 0-10

## 2020-04-08 ASSESSMENT — COPD QUESTIONNAIRES: CAT_SEVERITY: MODERATE

## 2020-04-08 ASSESSMENT — LIFESTYLE VARIABLES: SMOKING_STATUS: 1

## 2020-04-08 NOTE — H&P
depression, No anxiety, Concentration normal.  Endocrine:  No polyuria, No polydipsia, No hot flashes, No thyroid symptoms. Hematologic:  No epistaxis, No gingival bleeding, No petechiae, No ecchymosis. Lymphatic:  No lymphadenopathy, No lymphedema. Allergy / Immunologic:  No eczema, No frequent mucous infections, No frequent respiratory infections, No recurrent urticarial, No frequent skin infections. Vital Signs  /73   Pulse 75   Temp 97.9 °F (36.6 °C) (Oral)   Resp 14   Ht 5' 9\" (1.753 m)   Wt 184 lb (83.5 kg)   SpO2 94%   BMI 27.17 kg/m²     Physical Exam  CONSTITUTIONAL: awake, alert, cooperative, no apparent distress   EYES: pupils equal, round and reactive to light, sclera clear and conjunctiva normal  ENT: Normocephalic, without obvious abnormality, atramatic  NECK: supple, symmetrical, no jugular venous distension and no carotid bruits   HEMATOLOGIC/LYMPHATIC: no cervical, supraclavicular or axillary lymphadenopathy   LUNGS: no increased work of breathing and clear to auscultation   CARDIOVASCULAR: regular rate and rhythm, normal S1 and S2, no murmur noted  ABDOMEN: normal bowel sounds x 4, soft, non-distended, non-tender, no masses palpated, no hepatosplenomgally   MUSCULOSKELETAL: full range of motion noted, tone is normal  NEUROLOGIC: awake, alert, oriented to name, place and time. Motor skills grossly intact. SKIN: Normal skin color, texture, turgor and no jaundice. appears intact   EXTREMITIES: no LE edema   GYNECOLOGIC: Bimanual exam done. Smooth vulva and vagina. Small, smooth cervix. Mobile uterus. Fullness in the right adnexa. mild pain on deep palpation. RECTAL: No rectal masses. No parametrial masses. Normal tone. Imaging  3/16/20: Pelvic U/S > uterus measuring 10.82 x 7.5 x 6.61 cm. ES is 1.2cm. RO measuring 9.56 x 8.74 x 5.97 cm with an enlarged solid and cystic and vascular cyst.. LO measuring 6 x 5.2 x 7cm with an enlarged and solid mass.      3/30/2020: CT C/A/P:

## 2020-04-08 NOTE — ANESTHESIA PRE PROCEDURE
2300                                                      BMI:   Wt Readings from Last 3 Encounters:   03/27/20 186 lb (84.4 kg)   06/22/19 220 lb (99.8 kg)   09/12/17 215 lb (97.5 kg)     There is no height or weight on file to calculate BMI.    CBC:   Lab Results   Component Value Date    WBC 7.7 03/27/2020    RBC 4.33 03/27/2020    HGB 13.0 03/27/2020    HCT 39.0 03/27/2020    MCV 89.9 03/27/2020    RDW 14.8 03/27/2020     03/27/2020       CMP:   Lab Results   Component Value Date     03/27/2020    K 4.3 03/27/2020    CL 99 03/27/2020    CO2 23 03/27/2020    BUN 16 03/27/2020    CREATININE 0.5 03/27/2020    GFRAA >60 03/27/2020    GFRAA >60 11/19/2012    AGRATIO 1.2 03/27/2020    LABGLOM >60 03/27/2020    GLUCOSE 155 03/27/2020    PROT 7.1 03/27/2020    PROT 7.1 11/19/2012    CALCIUM 9.3 03/27/2020    BILITOT <0.2 03/27/2020    ALKPHOS 159 03/27/2020    AST 24 03/27/2020    ALT 22 03/27/2020       POC Tests: No results for input(s): POCGLU, POCNA, POCK, POCCL, POCBUN, POCHEMO, POCHCT in the last 72 hours. Coags: No results found for: PROTIME, INR, APTT    HCG (If Applicable): No results found for: PREGTESTUR, PREGSERUM, HCG, HCGQUANT     ABGs: No results found for: PHART, PO2ART, MDN9SUT, ATE4RAS, BEART, Q4RKMIJR     Type & Screen (If Applicable):  No results found for: LABABO, [address]    Anesthesia Evaluation  Patient summary reviewed and Nursing notes reviewed no history of anesthetic complications:   Airway: Mallampati: II  TM distance: >3 FB   Neck ROM: full  Mouth opening: > = 3 FB Dental: normal exam         Pulmonary: breath sounds clear to auscultation  (+) COPD: moderate,  current smoker (1 ppd since age 12  quit few days ago )          Patient did not smoke on day of surgery.                  Cardiovascular:  Exercise tolerance: good (>4 METS),   (+) past MI (2008  followed by stent 2008 and stent 2009): no interval change, CAD:,     (-)  angina    NYHA Classification: II  ECG

## 2020-04-08 NOTE — OP NOTE
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) [C56.1]    Post-Op Diagnosis: Same       Procedure(s):  DIAGNOSTIC LAPAROSCOPY,; LAPAROTOMY RESECTION OF A PELVIC MASS, COMPLETE HYSTERECTOMY, BILATERAL SALPINGO-OOPHORECTOMY, PELVIC LYMPHADENECTOMY,  OMENTECTOMY, APPENDECTOMY; RADICAL PARAMETRECTOMY; TUMOR DEBULKING, PLACEMENT OF NEGATIVE WOUND VAC STERILE DRESSING (SURFACE AREA > 50CM)    Surgeon(s):  Ann Gardner DO    Assistant:   Surgical Assistant: Eric Russo;  Davon Champagne    Anesthesia: General    Estimated Blood Loss (mL): 200     Complications: None    Specimens:   ID Type Source Tests Collected by Time Destination   1 : ASCITES Body Fluid Ascitic Fluid CYTOLOGY, NON-GYN [facility],  4/8/2020 1406    2 : ASCITIES Body Fluid Ascitic Fluid CYTOLOGY, NON-GYN [facility],  4/8/2020 1429    A : LEFT PERICOLIC GUTTER Tissue Tissue SURGICAL PATHOLOGY [facility],  4/8/2020 1414    B : LEFT TUBE AND OVARY Tissue Tissue SURGICAL PATHOLOGY [facility],  4/8/2020 1435    C : RIGHT TUBE AND OVARY Tissue Tissue SURGICAL PATHOLOGY [facility],  4/8/2020 1451    D : UTERUS AND CERVIX Tissue Tissue SURGICAL PATHOLOGY [facility],  4/8/2020 1528    E : POSTERIOR PERITONEUM Tissue Tissue SURGICAL PATHOLOGY [facility],  4/8/2020 1542    F : RIGHT PELVIC SIDEWALL Tissue Tissue SURGICAL PATHOLOGY [facility],  4/8/2020 1546    G : LEFT PELVIC LYMPH NODES Tissue Tissue SURGICAL PATHOLOGY [facility],  4/8/2020 1553    H : RIGHT PELVIC LYMPH NODES Tissue Tissue SURGICAL PATHOLOGY [facility],  4/8/2020 1557    I : APPENDIX Tissue Tissue SURGICAL PATHOLOGY [facility],  4/8/2020 1612    J : OMENTUM Tissue Tissue SURGICAL PATHOLOGY [facility],  4/8/2020 1619    K : RIGHT DIAPHRAGM PERITONEUM Tissue Tissue SURGICAL PATHOLOGY [facility],  4/8/2020 The rectosigmoid colon was then clamped and air inserted from the anus into the rectum as water was placed in the pelvis. No air leak noted. No obvious rectosigmoid defect noted. The paracolic gutter peritoneum was then resected where tumor nodules were seen. The bilateral pelvic lymph nodes were then removed with the margins of resection being the genitofemoral nerve laterally, the ureter medially, midportion common iliac artery superiorly and distal circumflex iliac vein inferiorly and the obturator nerve inferiorly. Attention was then taken to the para-aortic lymph nodes. Nodes could not be palpated and so no klever dissection done. The appendix had previously been freed from it's pelvic and right sidewall adhesions. Because of the proximity to the mass and adhesions, decision was made to remove the appendix. The peritoneum under the appendix was seen and a small window created. The base of the appendix was doubly clamped with hemostat clamps. The appendiceal artery was identified and clamped and cauterized with the ligasure. The appendix was then dissected off with a scalpel between the clamps and handed off the field. The base was then suture ligated with a 20 silk suture and then ligated with a 2-0 silk tie. The Bookwalter retractor was then repositioned and attention was turned to the upper abdomen. The infracolic and supracolic omentum were carefully dissected off the transverse colon sharply and with assistance from the bovie cautery. The spleno-colic, ye-colic and gastro-colic ligaments were then dissected. The rest of the omentum was then dissected off the greater curvature of the stomach with a ligasure. The total omentectomy was then completed with dissection of the hepato-colic ligaments. The right diaphragm peritoneum was then grasped with an allis clamp. The peritoneum along the right diaphragm was then stripped with the majority of the tumor resected with the radical parametrectomy.  The

## 2020-04-08 NOTE — ANESTHESIA POSTPROCEDURE EVALUATION
Department of Anesthesiology  Postprocedure Note    Patient: Luis Rollins  MRN: [id number]  YOB: 1960  Date of evaluation: 4/8/2020  Time:  6:59 PM     Procedure Summary     Date:  04/08/20 Room / Location:  [address] [place] [address]    Anesthesia Start:  5015 Anesthesia Stop:  0833    Procedure:  DIAGNOSTIC LAPAROSCOPY,; LAPAROTOMY RESECTION OF A PELVIC MASS, COMPLETE HYSTERECTOMY, BILATERAL SALPINGO-OOPHORECTOMY, PELVIC LYMPHADENECTOMY,  OMENTECTOMY, APPENDECTOMY; RADICAL PERIMETRECTOMY; TUMOR DEBULKING (Bilateral Abdomen) Diagnosis:       Malignant neoplasm of right ovary (HCC)      (Malignant neoplasm of right ovary ([facility] [address] [C56.1])    Surgeon:  Dell Lau DO Responsible Provider:  Esperanza Sidhu DO    Anesthesia Type:  general ASA Status:  4          Anesthesia Type: general    Gallo Phase I: Gallo Score: 8    Gallo Phase II:      Last vitals: Reviewed and per EMR flowsheets.        Anesthesia Post Evaluation    Patient location during evaluation: PACU  Patient participation: complete - patient participated  Level of consciousness: awake and alert  Pain score: 7  Airway patency: patent  Nausea & Vomiting: no nausea and no vomiting  Complications: no  Cardiovascular status: hemodynamically stable  Respiratory status: acceptable  Hydration status: euvolemic

## 2020-04-08 NOTE — PROGRESS NOTES
Pt arrived form OR, s/p DIAGNOSTIC LAPAROSCOPY,; LAPAROTOMY RESECTION OF A PELVIC MASS, COMPLETE HYSTERECTOMY, BILATERAL SALPINGO-OOPHORECTOMY, PELVIC LYMPHADENECTOMY,  OMENTECTOMY, APPENDECTOMY; RADICAL PERIMETRECTOMY; TUMOR DEBULKING (Bilateral Abdomen), report received form CRNA, pt received 2 mg of dilaudid, DR. HOLMAN here to perform a TAP block

## 2020-04-09 LAB
A/G RATIO: 0.9 (ref 1.1–2.2)
ALBUMIN SERPL-MCNC: 2.6 G/DL (ref 3.4–5)
ALP BLD-CCNC: 95 U/L (ref 40–129)
ALT SERPL-CCNC: 12 U/L (ref 10–40)
ANION GAP SERPL CALCULATED.3IONS-SCNC: 12 MMOL/L (ref 3–16)
AST SERPL-CCNC: 23 U/L (ref 15–37)
BILIRUB SERPL-MCNC: 0.5 MG/DL (ref 0–1)
BUN BLDV-MCNC: 10 MG/DL (ref 7–20)
CALCIUM SERPL-MCNC: 8.2 MG/DL (ref 8.3–10.6)
CHLORIDE BLD-SCNC: 101 MMOL/L (ref 99–110)
CO2: 21 MMOL/L (ref 21–32)
CREAT SERPL-MCNC: 0.7 MG/DL (ref 0.6–1.1)
GFR AFRICAN AMERICAN: >60
GFR NON-AFRICAN AMERICAN: >60
GLOBULIN: 2.8 G/DL
GLUCOSE BLD-MCNC: 91 MG/DL (ref 70–99)
HCT VFR BLD CALC: 40.4 % (ref 36–48)
HEMOGLOBIN: 13.6 G/DL (ref 12–16)
MCH RBC QN AUTO: 30 PG (ref 26–34)
MCHC RBC AUTO-ENTMCNC: 33.8 G/DL (ref 31–36)
MCV RBC AUTO: 88.9 FL (ref 80–100)
PDW BLD-RTO: 14.3 % (ref 12.4–15.4)
PLATELET # BLD: 317 K/UL (ref 135–450)
PMV BLD AUTO: 8.9 FL (ref 5–10.5)
POTASSIUM REFLEX MAGNESIUM: 4.7 MMOL/L (ref 3.5–5.1)
RBC # BLD: 4.54 M/UL (ref 4–5.2)
SODIUM BLD-SCNC: 134 MMOL/L (ref 136–145)
TOTAL PROTEIN: 5.4 G/DL (ref 6.4–8.2)
WBC # BLD: 9 K/UL (ref 4–11)

## 2020-04-09 PROCEDURE — 97162 PT EVAL MOD COMPLEX 30 MIN: CPT

## 2020-04-09 PROCEDURE — 97166 OT EVAL MOD COMPLEX 45 MIN: CPT

## 2020-04-09 PROCEDURE — 94761 N-INVAS EAR/PLS OXIMETRY MLT: CPT

## 2020-04-09 PROCEDURE — 80053 COMPREHEN METABOLIC PANEL: CPT

## 2020-04-09 PROCEDURE — 1200000000 HC SEMI PRIVATE

## 2020-04-09 PROCEDURE — 97535 SELF CARE MNGMENT TRAINING: CPT

## 2020-04-09 PROCEDURE — 6360000002 HC RX W HCPCS: Performed by: OBSTETRICS & GYNECOLOGY

## 2020-04-09 PROCEDURE — 2700000000 HC OXYGEN THERAPY PER DAY

## 2020-04-09 PROCEDURE — 94770 HC ETCO2 MONITOR DAILY: CPT

## 2020-04-09 PROCEDURE — 97530 THERAPEUTIC ACTIVITIES: CPT

## 2020-04-09 PROCEDURE — C9113 INJ PANTOPRAZOLE SODIUM, VIA: HCPCS | Performed by: OBSTETRICS & GYNECOLOGY

## 2020-04-09 PROCEDURE — 85027 COMPLETE CBC AUTOMATED: CPT

## 2020-04-09 PROCEDURE — 36415 COLL VENOUS BLD VENIPUNCTURE: CPT

## 2020-04-09 PROCEDURE — 6370000000 HC RX 637 (ALT 250 FOR IP): Performed by: OBSTETRICS & GYNECOLOGY

## 2020-04-09 PROCEDURE — 2580000003 HC RX 258: Performed by: OBSTETRICS & GYNECOLOGY

## 2020-04-09 RX ADMIN — KETOROLAC TROMETHAMINE 15 MG: 30 INJECTION, SOLUTION INTRAMUSCULAR at 12:15

## 2020-04-09 RX ADMIN — CEFAZOLIN SODIUM 2 G: 1 POWDER, FOR SOLUTION INTRAMUSCULAR; INTRAVENOUS at 11:35

## 2020-04-09 RX ADMIN — ASPIRIN 81 MG: 81 TABLET, COATED ORAL at 11:02

## 2020-04-09 RX ADMIN — METOPROLOL TARTRATE 25 MG: 25 TABLET, FILM COATED ORAL at 21:54

## 2020-04-09 RX ADMIN — Medication 10 ML: at 11:37

## 2020-04-09 RX ADMIN — MORPHINE SULFATE: 1 INJECTION, SOLUTION INTRAVENOUS at 18:31

## 2020-04-09 RX ADMIN — KETOROLAC TROMETHAMINE 15 MG: 30 INJECTION, SOLUTION INTRAMUSCULAR at 18:43

## 2020-04-09 RX ADMIN — PANTOPRAZOLE SODIUM 40 MG: 40 INJECTION, POWDER, FOR SOLUTION INTRAVENOUS at 11:03

## 2020-04-09 RX ADMIN — ENOXAPARIN SODIUM 40 MG: 40 INJECTION SUBCUTANEOUS at 11:03

## 2020-04-09 RX ADMIN — GABAPENTIN 100 MG: 100 CAPSULE ORAL at 15:40

## 2020-04-09 RX ADMIN — SODIUM CHLORIDE: 9 INJECTION, SOLUTION INTRAVENOUS at 07:19

## 2020-04-09 RX ADMIN — GABAPENTIN 100 MG: 100 CAPSULE ORAL at 21:54

## 2020-04-09 RX ADMIN — SENNOSIDES AND DOCUSATE SODIUM 1 TABLET: 8.6; 5 TABLET ORAL at 21:54

## 2020-04-09 RX ADMIN — ATORVASTATIN CALCIUM 40 MG: 40 TABLET, FILM COATED ORAL at 21:54

## 2020-04-09 RX ADMIN — SENNOSIDES AND DOCUSATE SODIUM 1 TABLET: 8.6; 5 TABLET ORAL at 11:03

## 2020-04-09 RX ADMIN — GABAPENTIN 100 MG: 100 CAPSULE ORAL at 11:03

## 2020-04-09 RX ADMIN — SODIUM CHLORIDE: 9 INJECTION, SOLUTION INTRAVENOUS at 15:12

## 2020-04-09 RX ADMIN — KETOROLAC TROMETHAMINE 15 MG: 30 INJECTION, SOLUTION INTRAMUSCULAR at 04:59

## 2020-04-09 RX ADMIN — METOPROLOL TARTRATE 25 MG: 25 TABLET, FILM COATED ORAL at 11:02

## 2020-04-09 RX ADMIN — CEFAZOLIN SODIUM 2 G: 1 POWDER, FOR SOLUTION INTRAMUSCULAR; INTRAVENOUS at 01:15

## 2020-04-09 ASSESSMENT — PAIN - FUNCTIONAL ASSESSMENT: PAIN_FUNCTIONAL_ASSESSMENT: ACTIVITIES ARE NOT PREVENTED

## 2020-04-09 ASSESSMENT — PAIN DESCRIPTION - ORIENTATION: ORIENTATION: MID

## 2020-04-09 ASSESSMENT — PAIN DESCRIPTION - PAIN TYPE: TYPE: SURGICAL PAIN

## 2020-04-09 ASSESSMENT — PAIN SCALES - GENERAL
PAINLEVEL_OUTOF10: 6
PAINLEVEL_OUTOF10: 0
PAINLEVEL_OUTOF10: 0
PAINLEVEL_OUTOF10: 6
PAINLEVEL_OUTOF10: 6
PAINLEVEL_OUTOF10: 0

## 2020-04-09 ASSESSMENT — PAIN DESCRIPTION - FREQUENCY: FREQUENCY: INTERMITTENT

## 2020-04-09 ASSESSMENT — PAIN DESCRIPTION - LOCATION: LOCATION: ABDOMEN

## 2020-04-09 ASSESSMENT — PAIN DESCRIPTION - ONSET: ONSET: ON-GOING

## 2020-04-09 ASSESSMENT — PAIN DESCRIPTION - DESCRIPTORS: DESCRIPTORS: ACHING

## 2020-04-09 ASSESSMENT — PAIN DESCRIPTION - PROGRESSION: CLINICAL_PROGRESSION: NOT CHANGED

## 2020-04-09 NOTE — PROGRESS NOTES
Patient had velez catheter removed at 0500. Patient had not voided by 1300. Patient was bladder scanned and patient had 300mL in bladder. Catheter was placed by MD law through phone call.    Electronically signed by Sofya Dodson RN on 4/9/2020 at 4:06 PM

## 2020-04-09 NOTE — PROGRESS NOTES
4 Eyes Admission Assessment     I agree as the admission nurse that 2 RN's have performed a thorough Head to Toe Skin Assessment on the patient. ALL assessment sites listed below have been assessed on admission. Areas assessed by both nurses:   [x]   Head, Face, and Ears   [x]   Shoulders, Back, and Chest  [x]   Arms, Elbows, and Hands   [x]   Coccyx, Sacrum, and Ischum  [x]   Legs, Feet, and Heels        Does the Patient have Skin Breakdown?   No         Mike Prevention initiated:  NA   Wound Care Orders initiated:  NA      WOC nurse consulted for Pressure Injury (Stage 3,4, Unstageable, DTI, NWPT, and Complex wounds):  NA      Nurse 1 eSignature: Electronically signed by Meir Dillon RN on 4/9/20 at 1:56 AM EDT    **SHARE this note so that the co-signing nurse is able to place an eSignature**    Nurse 2 eSignature: Electronically signed by Austyn Jones RN on 4/9/20 at 2:30 AM EDT

## 2020-04-09 NOTE — PROGRESS NOTES
Patient admitted to room 5522. Patient is AAOx4. VSS. Patient has velez in place. Patient complains of some pain. PCA pump in place. Patient educated on use. Patient denies any nausea or vomiting. Will continue to monitor.

## 2020-04-09 NOTE — PROGRESS NOTES
Occupational Therapy    Initial Assessment and Treatment  Date: 2020   Patient Name: Jovany Leal  MRN: [id number]     : 1960    Date of Service: 2020    Discharge Recommendations: Jovany Leal scored a 15/24 on the AM-PAC ADL Inpatient form. Current research shows that an AM-PAC score of 17 or less is typically not associated with a discharge to the patient's home setting. Based on the patients AM-PAC score and their current ADL deficits, it is recommended that the patient have 3-5 sessions per week of Occupational Therapy at d/c to increase the patients independence. **See Assessment below**    OT Equipment Recommendations  Other: Continue to assess pending progress    Assessment   Performance deficits / Impairments: Decreased high-level IADLs;Decreased functional mobility ; Decreased endurance;Decreased ADL status; Decreased strength;Decreased balance;Decreased posture;Decreased sensation    Assessment: Pt's functional independence is well below baseline. Typically, pt is independent with ADLs, mobility, IADLs and is caregiver for . Today, pt was very limited by abdominal pain, nausea, and weakness/numbness in LLE. Two trials completed as pt needed to lie back down at first due to nausea. Unable to take steps or assess OOB due to significant weakness in LLE and abdominal pain. Will attempt to progress as able. Pt is hopeful to return home at d/c.      Treatment Diagnosis: Impaired ADLs, balance/strength, and mobility    Patient Education: Role of OT- pt verb understanding    REQUIRES OT FOLLOW UP: Yes    Activity Tolerance: Patient limited by fatigue;Patient limited by pain    Safety Devices in place: Yes  Type of devices: Left in bed;Bed alarm in place;Call light within reach;Nurse notified               Treatment Diagnosis: Impaired ADLs, balance/strength, and mobility      Restrictions  Position Activity Restriction  Other position/activity restrictions: Ambulate patient, abdominal binder    Subjective   General  Chart Reviewed: Yes  Additional Pertinent Hx: 61 y.o. F adm 4/8 for diagnostic laparoscopy, laparotomy resection of pelvic mass, complete hysterectomy, bilateral salpingo-oophorectomy, pelvic lymphadenectomy, omentectomy, appendectomy, radical parametrectomy, tumor debulking, placement of negative wound vac. PMHx: HTN, CAD, ovarian cancer, tubal ligation, cardiac surgery    Family / Caregiver Present: No    Diagnosis: Malignant neoplasm of right ovary    Subjective  Subjective: Pt found supine, agreeable to OT. PCA pump in place at end of session. Pleasant and cooperative. Patient Currently in Pain: Yes(5-6 surgical pain, RN aware)      Social/Functional History  Social/Functional History  Lives With: Spouse( is disabled)  Type of Home: Mobile home(Double wide )  Home Layout: One level  Home Access: Stairs to enter with rails  Entrance Stairs - Number of Steps: 3 RAMIN  Bathroom Shower/Tub: Tub/Shower unit  Bathroom Toilet: Standard  Bathroom Equipment: Shower chair  Home Equipment: Daren Morataya  ADL Assistance: Independent  Homemaking Assistance: Independent  Homemaking Responsibilities: Yes(Pt is primary)  Ambulation Assistance: Independent  Transfer Assistance: Independent  Active : Yes  Additional Comments: Pt reports her  can walk, but falls a lot and is unstable. Pt's sister in law, brother in law, and grandson are staying with pt and  right now. They are able to assist while pt is recovering. Objective    Treatment included functional transfer training, ADLs, and patient education.      Vision: Within Functional Limits(wears glasses)  Hearing: Within functional limits      Orientation  Overall Orientation Status: Within Functional Limits    Balance  Sitting Balance: Stand by assistance(sitting at EOB, 4-5 mins 1st trial, 10 mins 2nd trial; for conversation and ADLs)    Standing Balance  Comment: Pt attempted to perform sit-stand transfer from EOB to RW. L knee blocked. 1st trial unable to stand fully and kept scooting towards EOB. 2nd trial pt able stand, almost upright, L knee blocked- heavy reliance on walker handles. Unable to fully stand upright due to abdominal pain, and pt returned to sitting    Toilet Transfers  Toilet Transfer: Unable to assess    ADL  Feeding: Independent; Beverage management  Grooming: Independent;Setup(seated at EOB to perform oral care, wipe face)  LE Dressing: Maximum assistance(to adjust socks)  Toileting: (denies need- attempted to stand from EOB to possibly transfer to [facility]; unable to bear full weight on LLE. Bedpan will need to be used at this time)    Bed mobility  Rolling to Left: Moderate assistance  Rolling to Right: Moderate assistance  Supine to Sit: Moderate assistance(HOB elevated, via attempted log roll. Completed 2 times )  Sit to Supine: Dependent/Total(Mod A x2; 2 trials)  Scooting: Minimal assistance(effortful to scoot to EOB, Mod A x2 to scoot towards HOB)     Transfers  Sit to stand: 2 Person assistance(Mod x1 + Max x1; 2 trials from EOB; 1st trial unsuccessful )  Stand to sit: 2 Person assistance    Cognition  Overall Cognitive Status: WFL    LUE AROM (degrees)  LUE AROM : WFL  RUE AROM (degrees)  RUE AROM : WFL       Plan   Plan  Times per week: 2-5x  Times per day: Daily  If patient discharges prior to next treatment, this note will serve as discharge summary. Will continue per POC if patient does not discharge.       AM-PAC Score        AM-PAC Inpatient Daily Activity Raw Score: 15 (04/09/20 1112)  AM-PAC Inpatient ADL T-Scale Score : 34.69 (04/09/20 1112)  ADL Inpatient CMS 0-100% Score: 56.46 (04/09/20 1112)  ADL Inpatient CMS G-Code Modifier : CK (04/09/20 1112)    Goals  Short term goals  Time Frame for Short term goals: By d/c   Short term goal 1: Bedside or chair transfer with Min A - not met   Short term goal 2: Standing x2 mins with CGA for further ADLs/mobility - not

## 2020-04-09 NOTE — PROGRESS NOTES
Progress Note     Name: Rivka Chan Room: 4270/7955-26   YOB: 1960 MRN: [id number]   Sex: female CSN: [id number]    LOS: 1   PCP: Katherine Stallings MD   Attending: Monique Olivia DO Admitting: Monique Olivia DO        Subjective:   Doing well. Mild nausea. PT/OT at bedside helping patient ambulate. Pain controlled w/ PCA. Mccall removed this am. Not yet voided    Physical Exam:   /66   Pulse 84   Temp 98.3 °F (36.8 °C) (Oral)   Resp 16   Ht 5' 9\" (1.753 m)   Wt 184 lb (83.5 kg)   SpO2 94%   BMI 27.17 kg/m²     I/O last 3 completed shifts: In: 4395 [P.O.:50; I.V.:4345]  Out: 1915 [Urine:1215; Drains:200; Blood:500]  I/O this shift:  In: 46 [P.O.:240; I.V.:8]  Out: -       Gen: AAO  Resp: CTAB  CV: RRR  Abd: +BS, soft, nondistended, appropriately tender  Inc: c/d/i, no erythema  BE SS  Ext: nontender, no evidence of DVT     No Known Allergies    Medications:   Reviewed    Diagnostic:   Reviewed    Labs:   reviewed  Recent Labs     04/09/20  0502   WBC 9.0   HGB 13.6   HCT 40.4        Recent Labs     04/09/20  0502   *   K 4.7      CO2 21   BUN 10   CREATININE 0.7   CALCIUM 8.2*     Recent Labs     04/09/20  0502   AST 23   ALT 12   BILITOT 0.5   ALKPHOS 95     No results for input(s): INR in the last 72 hours. No results for input(s):  in the last 72 hours.     Assessment:     Patient Active Problem List   Diagnosis Code    Coronary artery disease I25.10    Hyperlipidemia E78.5    HTN (hypertension) I10    Alcohol abuse F10.10    Tobacco abuse Z72.0    Ovarian cancer on left (HCC) C56.2       Impression/Plan:   POD#1  S/p DIAGNOSTIC LAPAROSCOPY,; LAPAROTOMY RESECTION OF A PELVIC MASS, COMPLETE HYSTERECTOMY, BILATERAL SALPINGO-OOPHORECTOMY, PELVIC LYMPHADENECTOMY,  OMENTECTOMY, APPENDECTOMY; RADICAL PARAMETRECTOMY; TUMOR DEBULKING, PLACEMENT OF NEGATIVE WOUND VAC STERILE DRESSING (SURFACE AREA > 50CM) on 4/8/2020  For Ovarian carcinoma - stage IIIC    PLAN:  -

## 2020-04-09 NOTE — PROGRESS NOTES
Nurse spoke to Dr. Abdias Moe and he stated to bladder scan patient 8 hours after velez catheter removed if patient has not voided by that time. If patient has more than 300mL in bladder then anchor a new velez catheter.

## 2020-04-10 LAB
ANION GAP SERPL CALCULATED.3IONS-SCNC: 8 MMOL/L (ref 3–16)
BUN BLDV-MCNC: 8 MG/DL (ref 7–20)
CALCIUM SERPL-MCNC: 8.2 MG/DL (ref 8.3–10.6)
CHLORIDE BLD-SCNC: 101 MMOL/L (ref 99–110)
CO2: 25 MMOL/L (ref 21–32)
CREAT SERPL-MCNC: 0.7 MG/DL (ref 0.6–1.1)
GFR AFRICAN AMERICAN: >60
GFR NON-AFRICAN AMERICAN: >60
GLUCOSE BLD-MCNC: 110 MG/DL (ref 70–99)
HCT VFR BLD CALC: 37 % (ref 36–48)
HEMOGLOBIN: 12.2 G/DL (ref 12–16)
MCH RBC QN AUTO: 29.7 PG (ref 26–34)
MCHC RBC AUTO-ENTMCNC: 32.9 G/DL (ref 31–36)
MCV RBC AUTO: 90.4 FL (ref 80–100)
PDW BLD-RTO: 14.2 % (ref 12.4–15.4)
PLATELET # BLD: 293 K/UL (ref 135–450)
PMV BLD AUTO: 8.7 FL (ref 5–10.5)
POTASSIUM REFLEX MAGNESIUM: 4.3 MMOL/L (ref 3.5–5.1)
RBC # BLD: 4.09 M/UL (ref 4–5.2)
SODIUM BLD-SCNC: 134 MMOL/L (ref 136–145)
WBC # BLD: 9.2 K/UL (ref 4–11)

## 2020-04-10 PROCEDURE — 2580000003 HC RX 258: Performed by: OBSTETRICS & GYNECOLOGY

## 2020-04-10 PROCEDURE — 6360000002 HC RX W HCPCS: Performed by: OBSTETRICS & GYNECOLOGY

## 2020-04-10 PROCEDURE — 94761 N-INVAS EAR/PLS OXIMETRY MLT: CPT

## 2020-04-10 PROCEDURE — 2700000000 HC OXYGEN THERAPY PER DAY

## 2020-04-10 PROCEDURE — C9113 INJ PANTOPRAZOLE SODIUM, VIA: HCPCS | Performed by: OBSTETRICS & GYNECOLOGY

## 2020-04-10 PROCEDURE — 6370000000 HC RX 637 (ALT 250 FOR IP): Performed by: OBSTETRICS & GYNECOLOGY

## 2020-04-10 PROCEDURE — 94770 HC ETCO2 MONITOR DAILY: CPT

## 2020-04-10 PROCEDURE — 85027 COMPLETE CBC AUTOMATED: CPT

## 2020-04-10 PROCEDURE — 97530 THERAPEUTIC ACTIVITIES: CPT

## 2020-04-10 PROCEDURE — 80048 BASIC METABOLIC PNL TOTAL CA: CPT

## 2020-04-10 PROCEDURE — 36415 COLL VENOUS BLD VENIPUNCTURE: CPT

## 2020-04-10 PROCEDURE — 1200000000 HC SEMI PRIVATE

## 2020-04-10 RX ADMIN — Medication 10 ML: at 21:57

## 2020-04-10 RX ADMIN — SENNOSIDES AND DOCUSATE SODIUM 1 TABLET: 8.6; 5 TABLET ORAL at 08:15

## 2020-04-10 RX ADMIN — KETOROLAC TROMETHAMINE 15 MG: 30 INJECTION, SOLUTION INTRAMUSCULAR at 21:56

## 2020-04-10 RX ADMIN — GABAPENTIN 100 MG: 100 CAPSULE ORAL at 13:40

## 2020-04-10 RX ADMIN — KETOROLAC TROMETHAMINE 15 MG: 30 INJECTION, SOLUTION INTRAMUSCULAR at 11:20

## 2020-04-10 RX ADMIN — METOPROLOL TARTRATE 25 MG: 25 TABLET, FILM COATED ORAL at 08:15

## 2020-04-10 RX ADMIN — SENNOSIDES AND DOCUSATE SODIUM 1 TABLET: 8.6; 5 TABLET ORAL at 21:56

## 2020-04-10 RX ADMIN — SODIUM CHLORIDE: 9 INJECTION, SOLUTION INTRAVENOUS at 07:01

## 2020-04-10 RX ADMIN — ASPIRIN 81 MG: 81 TABLET, COATED ORAL at 08:15

## 2020-04-10 RX ADMIN — METOPROLOL TARTRATE 25 MG: 25 TABLET, FILM COATED ORAL at 21:56

## 2020-04-10 RX ADMIN — GABAPENTIN 100 MG: 100 CAPSULE ORAL at 08:15

## 2020-04-10 RX ADMIN — PANTOPRAZOLE SODIUM 40 MG: 40 INJECTION, POWDER, FOR SOLUTION INTRAVENOUS at 08:15

## 2020-04-10 RX ADMIN — KETOROLAC TROMETHAMINE 15 MG: 30 INJECTION, SOLUTION INTRAMUSCULAR at 18:23

## 2020-04-10 RX ADMIN — GABAPENTIN 100 MG: 100 CAPSULE ORAL at 21:56

## 2020-04-10 RX ADMIN — MORPHINE SULFATE 4 MG: 2 INJECTION, SOLUTION INTRAMUSCULAR; INTRAVENOUS at 23:37

## 2020-04-10 RX ADMIN — KETOROLAC TROMETHAMINE 15 MG: 30 INJECTION, SOLUTION INTRAMUSCULAR at 04:34

## 2020-04-10 RX ADMIN — ENOXAPARIN SODIUM 40 MG: 40 INJECTION SUBCUTANEOUS at 08:15

## 2020-04-10 RX ADMIN — KETOROLAC TROMETHAMINE 15 MG: 30 INJECTION, SOLUTION INTRAMUSCULAR at 00:00

## 2020-04-10 RX ADMIN — ANTACID TABLETS 500 MG: 500 TABLET, CHEWABLE ORAL at 18:41

## 2020-04-10 RX ADMIN — MORPHINE SULFATE 4 MG: 2 INJECTION, SOLUTION INTRAMUSCULAR; INTRAVENOUS at 18:42

## 2020-04-10 RX ADMIN — Medication 10 ML: at 08:16

## 2020-04-10 RX ADMIN — OXYCODONE 10 MG: 5 TABLET ORAL at 15:40

## 2020-04-10 RX ADMIN — ATORVASTATIN CALCIUM 40 MG: 40 TABLET, FILM COATED ORAL at 21:59

## 2020-04-10 ASSESSMENT — PAIN SCALES - GENERAL
PAINLEVEL_OUTOF10: 6
PAINLEVEL_OUTOF10: 8
PAINLEVEL_OUTOF10: 8
PAINLEVEL_OUTOF10: 7
PAINLEVEL_OUTOF10: 6

## 2020-04-10 ASSESSMENT — PAIN DESCRIPTION - PAIN TYPE
TYPE: SURGICAL PAIN
TYPE: SURGICAL PAIN

## 2020-04-10 ASSESSMENT — PAIN DESCRIPTION - LOCATION
LOCATION: ABDOMEN
LOCATION: ABDOMEN

## 2020-04-10 ASSESSMENT — PAIN DESCRIPTION - ONSET
ONSET: PROGRESSIVE
ONSET: ON-GOING

## 2020-04-10 ASSESSMENT — PAIN DESCRIPTION - FREQUENCY: FREQUENCY: INTERMITTENT

## 2020-04-10 ASSESSMENT — PAIN DESCRIPTION - PROGRESSION
CLINICAL_PROGRESSION: NOT CHANGED
CLINICAL_PROGRESSION: GRADUALLY WORSENING

## 2020-04-10 ASSESSMENT — PAIN - FUNCTIONAL ASSESSMENT
PAIN_FUNCTIONAL_ASSESSMENT: ACTIVITIES ARE NOT PREVENTED
PAIN_FUNCTIONAL_ASSESSMENT: PREVENTS OR INTERFERES WITH MANY ACTIVE NOT PASSIVE ACTIVITIES

## 2020-04-10 ASSESSMENT — PAIN DESCRIPTION - ORIENTATION: ORIENTATION: RIGHT;LOWER

## 2020-04-10 ASSESSMENT — PAIN DESCRIPTION - DESCRIPTORS: DESCRIPTORS: DISCOMFORT

## 2020-04-10 NOTE — PROGRESS NOTES
Patient resting in bed with eyes closed. Bed in lowest position, rails up x2, bed alarm activated. Patient shows no signs or symptoms of distress at this time. Patient has no needs at this time. Will continue to monitor.

## 2020-04-10 NOTE — PROGRESS NOTES
Scooting: SBA, scooting out towards EOB    Functional Mobility   Sit to Stand: Mod A x2 from EOB to RW; L knee blocked as it is very unsteady/unpredictable. Min A x2 from elevated EOB to/from [address]  Stand to Sit: Mod A x2 to control descent onto bed, cues for hand placement, Min A x2 to control descent into chair from [facility]  Bed to Chair Transfer: Dependent via [address]    Standing Balance: Pt stood x1 min at RW with assist x2, L knee blocked. Unable to achieve full upright posture (L knee weakness and abdominal pain). Also unable to bear enough weight on LLE to advance RLE to achieve proper positioning. Pt sat back on EOB and [address] used for safety. ADLs   Grooming: Independent, seated to brush hair  Toileting: Mccall, denies need for bm. Will likely require assist for management after bowel movement due to decreased standing tolerance      Activity Tolerance: Fair; limited by weakness in LLE       Patient Education: Calling with needs, d/c planning - pt verb understanding     Safety Devices in Place: Left in chair with alarm on, needs in reach, RN aware- as well to utilize [address] to return to bed          Therapy Time:   Individual Concurrent Group Co-treatment   Time In  1130         Time Out  1209         Minutes  39           Timed Code Treatment Minutes:  39    Total Treatment Minutes:  39      Goals:  Short term goals  Time Frame for Short term goals: By d/c   Short term goal 1: Bedside or chair transfer with Min A - not met   Short term goal 2: Standing x2 mins with CGA for further ADLs/mobility - not met  Short term goal 3: Chair push ups x7 reps to increase transfer independence - not met         Plan:      Times per week: 2-5x   Times per day: Daily    If patient is discharged prior to next treatment, this note will serve as the discharge summary.       Edgardo Beard, OT

## 2020-04-10 NOTE — PROGRESS NOTES
Progress Note     Name: Taisha Coulter Room: 2362/6716-28   YOB: 1960 MRN: [id number]   Sex: female CSN: [id number]    LOS: 2   PCP: Corrine Mackenzie MD   Attending: Phuong Moore DO Admitting: Phuong Moore DO        Subjective:   Doing well. Mild nausea. No emesis. Not very hungry. Pain controlled w/ PCA, although states not using it much. She feels more fatigued this am.    Mccall was replaced yesterday as she was unable to void. Physical Exam:   /76   Pulse 68   Temp 98.5 °F (36.9 °C) (Oral)   Resp 16   Ht 5' 9\" (1.753 m)   Wt 184 lb (83.5 kg)   SpO2 90%   BMI 27.17 kg/m²     I/O last 3 completed shifts: In: 265 [P.O.:240; I.V.:25]  Out: 2060 [Urine:1750; Drains:310]  I/O this shift:  In: 240 [P.O.:240]  Out: 610 [Drains:610]      Gen: AAO  Resp: CTAB  CV: RRR  Abd: +BS, soft, nondistended, appropriately tender  Inc: c/d/i, no erythema  BE Serous  Ext: nontender, no evidence of DVT     No Known Allergies    Medications:   Reviewed    Diagnostic:   Reviewed    Labs:   reviewed  Recent Labs     04/10/20  0448   WBC 9.2   HGB 12.2   HCT 37.0        Recent Labs     04/10/20  0449   *   K 4.3      CO2 25   BUN 8   CREATININE 0.7   CALCIUM 8.2*     Recent Labs     04/09/20  0502   AST 23   ALT 12   BILITOT 0.5   ALKPHOS 95     No results for input(s): INR in the last 72 hours. No results for input(s):  in the last 72 hours.     Assessment:     Patient Active Problem List   Diagnosis Code    Coronary artery disease I25.10    Hyperlipidemia E78.5    HTN (hypertension) I10    Alcohol abuse F10.10    Tobacco abuse Z72.0    Ovarian cancer on left (HCC) C56.2       Impression/Plan:   POD#2  S/p DIAGNOSTIC LAPAROSCOPY,; LAPAROTOMY RESECTION OF A PELVIC MASS, COMPLETE HYSTERECTOMY, BILATERAL SALPINGO-OOPHORECTOMY, PELVIC LYMPHADENECTOMY,  OMENTECTOMY, APPENDECTOMY; RADICAL PARAMETRECTOMY; TUMOR DEBULKING, PLACEMENT OF NEGATIVE WOUND VAC STERILE DRESSING (SURFACE AREA > 50CM) on 4/8/2020  For Ovarian carcinoma - stage IIIC    PLAN:  - DC PCA   > IV morphine and po oxycodone prn   > toradol q 6 hrs  - advance to low fiber diet as tolerated  - decrease IVF to 50cc/hr  - appreciate PT/OT. Encouraged ambulation  - encouraged IS use  - repeat labs in am  - cont velez for another 24hr. Will dc tomorrow  - monitor BE output.  Expect high output 2/2 ascites  - cont tele monitoring  - lovenox prophylaxis and daily ASA    ERLINDA MAYO DO., [facility]  Gynecologic Oncology  [facility]  [phone]  4/10/2020  10:38 AM

## 2020-04-11 LAB
ANION GAP SERPL CALCULATED.3IONS-SCNC: 8 MMOL/L (ref 3–16)
BUN BLDV-MCNC: 8 MG/DL (ref 7–20)
CALCIUM SERPL-MCNC: 8.5 MG/DL (ref 8.3–10.6)
CHLORIDE BLD-SCNC: 103 MMOL/L (ref 99–110)
CO2: 23 MMOL/L (ref 21–32)
CREAT SERPL-MCNC: 0.6 MG/DL (ref 0.6–1.1)
GFR AFRICAN AMERICAN: >60
GFR NON-AFRICAN AMERICAN: >60
GLUCOSE BLD-MCNC: 97 MG/DL (ref 70–99)
HCT VFR BLD CALC: 36.3 % (ref 36–48)
HEMOGLOBIN: 12.4 G/DL (ref 12–16)
MCH RBC QN AUTO: 30.5 PG (ref 26–34)
MCHC RBC AUTO-ENTMCNC: 34.3 G/DL (ref 31–36)
MCV RBC AUTO: 88.8 FL (ref 80–100)
PDW BLD-RTO: 14 % (ref 12.4–15.4)
PLATELET # BLD: 325 K/UL (ref 135–450)
PMV BLD AUTO: 8.7 FL (ref 5–10.5)
POTASSIUM SERPL-SCNC: 5.2 MMOL/L (ref 3.5–5.1)
RBC # BLD: 4.08 M/UL (ref 4–5.2)
SODIUM BLD-SCNC: 134 MMOL/L (ref 136–145)
WBC # BLD: 10.7 K/UL (ref 4–11)

## 2020-04-11 PROCEDURE — 6370000000 HC RX 637 (ALT 250 FOR IP): Performed by: OBSTETRICS & GYNECOLOGY

## 2020-04-11 PROCEDURE — 6360000002 HC RX W HCPCS: Performed by: OBSTETRICS & GYNECOLOGY

## 2020-04-11 PROCEDURE — 36415 COLL VENOUS BLD VENIPUNCTURE: CPT

## 2020-04-11 PROCEDURE — 80048 BASIC METABOLIC PNL TOTAL CA: CPT

## 2020-04-11 PROCEDURE — 1200000000 HC SEMI PRIVATE

## 2020-04-11 PROCEDURE — 85027 COMPLETE CBC AUTOMATED: CPT

## 2020-04-11 PROCEDURE — 2580000003 HC RX 258: Performed by: OBSTETRICS & GYNECOLOGY

## 2020-04-11 RX ORDER — PANTOPRAZOLE SODIUM 40 MG/1
40 TABLET, DELAYED RELEASE ORAL
Status: DISCONTINUED | OUTPATIENT
Start: 2020-04-12 | End: 2020-04-15 | Stop reason: HOSPADM

## 2020-04-11 RX ADMIN — OXYCODONE 10 MG: 5 TABLET ORAL at 22:35

## 2020-04-11 RX ADMIN — OXYCODONE 10 MG: 5 TABLET ORAL at 15:04

## 2020-04-11 RX ADMIN — MORPHINE SULFATE 4 MG: 2 INJECTION, SOLUTION INTRAMUSCULAR; INTRAVENOUS at 18:34

## 2020-04-11 RX ADMIN — MORPHINE SULFATE 4 MG: 2 INJECTION, SOLUTION INTRAMUSCULAR; INTRAVENOUS at 05:56

## 2020-04-11 RX ADMIN — Medication 10 ML: at 10:38

## 2020-04-11 RX ADMIN — MORPHINE SULFATE 2 MG: 2 INJECTION, SOLUTION INTRAMUSCULAR; INTRAVENOUS at 02:51

## 2020-04-11 RX ADMIN — ENOXAPARIN SODIUM 40 MG: 40 INJECTION SUBCUTANEOUS at 10:38

## 2020-04-11 RX ADMIN — ANTACID TABLETS 500 MG: 500 TABLET, CHEWABLE ORAL at 22:29

## 2020-04-11 RX ADMIN — METOPROLOL TARTRATE 25 MG: 25 TABLET, FILM COATED ORAL at 22:29

## 2020-04-11 RX ADMIN — SENNOSIDES AND DOCUSATE SODIUM 1 TABLET: 8.6; 5 TABLET ORAL at 10:38

## 2020-04-11 RX ADMIN — MORPHINE SULFATE 4 MG: 2 INJECTION, SOLUTION INTRAMUSCULAR; INTRAVENOUS at 11:37

## 2020-04-11 RX ADMIN — GABAPENTIN 100 MG: 100 CAPSULE ORAL at 22:29

## 2020-04-11 RX ADMIN — GABAPENTIN 100 MG: 100 CAPSULE ORAL at 10:38

## 2020-04-11 RX ADMIN — METOPROLOL TARTRATE 25 MG: 25 TABLET, FILM COATED ORAL at 10:36

## 2020-04-11 RX ADMIN — ANTACID TABLETS 500 MG: 500 TABLET, CHEWABLE ORAL at 10:41

## 2020-04-11 RX ADMIN — ASPIRIN 81 MG: 81 TABLET, COATED ORAL at 10:35

## 2020-04-11 RX ADMIN — GABAPENTIN 100 MG: 100 CAPSULE ORAL at 15:04

## 2020-04-11 RX ADMIN — ATORVASTATIN CALCIUM 40 MG: 40 TABLET, FILM COATED ORAL at 22:30

## 2020-04-11 RX ADMIN — SENNOSIDES AND DOCUSATE SODIUM 1 TABLET: 8.6; 5 TABLET ORAL at 22:30

## 2020-04-11 ASSESSMENT — PAIN SCALES - GENERAL
PAINLEVEL_OUTOF10: 7
PAINLEVEL_OUTOF10: 4
PAINLEVEL_OUTOF10: 7
PAINLEVEL_OUTOF10: 8
PAINLEVEL_OUTOF10: 8
PAINLEVEL_OUTOF10: 7
PAINLEVEL_OUTOF10: 6

## 2020-04-11 ASSESSMENT — PAIN DESCRIPTION - DESCRIPTORS: DESCRIPTORS: ACHING

## 2020-04-11 ASSESSMENT — PAIN DESCRIPTION - LOCATION
LOCATION: ABDOMEN
LOCATION: ABDOMEN

## 2020-04-11 ASSESSMENT — PAIN DESCRIPTION - ONSET
ONSET: ON-GOING
ONSET: PROGRESSIVE

## 2020-04-11 ASSESSMENT — PAIN DESCRIPTION - FREQUENCY
FREQUENCY: CONTINUOUS
FREQUENCY: CONTINUOUS

## 2020-04-11 ASSESSMENT — PAIN DESCRIPTION - PAIN TYPE
TYPE: SURGICAL PAIN
TYPE: SURGICAL PAIN

## 2020-04-11 ASSESSMENT — PAIN DESCRIPTION - PROGRESSION
CLINICAL_PROGRESSION: GRADUALLY WORSENING
CLINICAL_PROGRESSION: GRADUALLY WORSENING

## 2020-04-11 ASSESSMENT — PAIN - FUNCTIONAL ASSESSMENT: PAIN_FUNCTIONAL_ASSESSMENT: PREVENTS OR INTERFERES SOME ACTIVE ACTIVITIES AND ADLS

## 2020-04-11 NOTE — PROGRESS NOTES
- Neutropenia (ANC ? 500)   - Meningitis  - Endocarditis  - Undrained abscess   - Orbital cellulites, endopthalmitis  - Initial treatment of osteomyelitis  - Cystic fibrosis patient

## 2020-04-11 NOTE — PLAN OF CARE
Problem: Falls - Risk of:  Goal: Will remain free from falls  Description: Will remain free from falls  4/11/2020 0100 by Lianna Bueno RN  Outcome: Met This Shift  Note: Patient understands not to get out of bed without assistance. No falls during this shift. Will continue to monitor. 4/10/2020 1324 by Gadiel Quiroz RN  Outcome: Ongoing  4/10/2020 1319 by Gadiel Quiroz RN  Outcome: Ongoing  Note: Bed/chair alarm on. Bed in lowest position w/ wheels locked and in lowest position. Non skid socks on. Call light within reach. Up w/ Stedy. Worked w/ PT/OT today. Conts to have numbness in L leg- PT has seen no improvement---forwarded onto doctor     Problem: Pain:  Goal: Control of acute pain  Description: Control of acute pain  Outcome: Ongoing  Note: Patient is sleeping soundly, has no complaints of pain at this time. Will continue to monitor.

## 2020-04-11 NOTE — PROGRESS NOTES
Progress Note     Name: Brock Dougherty Room: 0249/7742-08   YOB: 1960 MRN: [id number]   Sex: female CSN: [id number]    LOS: 3   PCP: Roberto Elizabeth MD   Attending: Eric Munoz DO Admitting: Eric Munoz DO        Subjective:   Doing well. Mild nausea. No emesis. Tolerated 1/2 her breakfast this am. Feeling better this morning. Mccall still in place. Good u/o. Physical Exam:   /65   Pulse 85   Temp 98.5 °F (36.9 °C) (Oral)   Resp 16   Ht 5' 9\" (1.753 m)   Wt 184 lb (83.5 kg)   SpO2 93%   BMI 27.17 kg/m²     I/O last 3 completed shifts: In: 6569 [P.O.:340; I.V.:5250]  Out: 36 [Urine:850; Drains:1610]  No intake/output data recorded. Gen: AAO  Resp: CTAB  CV: RRR  Abd: +BS, soft, nondistended, appropriately tender  Inc: c/d/i, no erythema  BE Serous  Ext: nontender, no evidence of DVT     No Known Allergies    Medications:   Reviewed    Diagnostic:   Reviewed    Labs:   reviewed  Recent Labs     04/11/20  0442   WBC 10.7   HGB 12.4   HCT 36.3        Recent Labs     04/11/20  0442   *   K 5.2*      CO2 23   BUN 8   CREATININE 0.6   CALCIUM 8.5     Recent Labs     04/09/20  0502   AST 23   ALT 12   BILITOT 0.5   ALKPHOS 95     No results for input(s): INR in the last 72 hours. No results for input(s):  in the last 72 hours. Assessment:     Patient Active Problem List   Diagnosis Code    Coronary artery disease I25.10    Hyperlipidemia E78.5    HTN (hypertension) I10    Alcohol abuse F10.10    Tobacco abuse Z72.0    Ovarian cancer on left (HCC) C56.2       Impression/Plan:   POD#3  S/p DIAGNOSTIC LAPAROSCOPY,; LAPAROTOMY RESECTION OF A PELVIC MASS, COMPLETE HYSTERECTOMY, BILATERAL SALPINGO-OOPHORECTOMY, PELVIC LYMPHADENECTOMY,  OMENTECTOMY, APPENDECTOMY; RADICAL PARAMETRECTOMY; TUMOR DEBULKING, PLACEMENT OF NEGATIVE WOUND VAC STERILE DRESSING (SURFACE AREA > 50CM) on 4/8/2020  For Ovarian carcinoma - stage IIIC    2.  Leg leg weakness/numbness   - this is secondary to extensive radical peritonealectomy and use of deep pelvic retractors  3. Hyperkalemia    PLAN:  - DC velez  - discussed left leg weakness and numbness. Likely from nerve impingement from pelvic retractors or from her radical peritonealectomy. Will take time to resolve. Continue physical therapy. No need for further intervention at this time. - monitor BE output. Plan to DC BE prior to DC home  - cont tele monitoring  - lovenox prophylaxis and daily ASA  - diet as tolerated  - encouraged IS use  - repeat BMP in am    DISPO: continue inpatient care. Not yet meeting DC criteria. Will re-eval on Monday for possible DC home w/ home PT versus possible DC to ARU.       Mario Bruch [facility]  Gynecologic Oncology  [facility]  [phone]  4/11/2020  9:44 AM

## 2020-04-12 LAB
ANION GAP SERPL CALCULATED.3IONS-SCNC: 10 MMOL/L (ref 3–16)
BUN BLDV-MCNC: 8 MG/DL (ref 7–20)
CALCIUM SERPL-MCNC: 8.8 MG/DL (ref 8.3–10.6)
CHLORIDE BLD-SCNC: 101 MMOL/L (ref 99–110)
CO2: 24 MMOL/L (ref 21–32)
CREAT SERPL-MCNC: 0.6 MG/DL (ref 0.6–1.1)
GFR AFRICAN AMERICAN: >60
GFR NON-AFRICAN AMERICAN: >60
GLUCOSE BLD-MCNC: 115 MG/DL (ref 70–99)
MAGNESIUM: 1.5 MG/DL (ref 1.8–2.4)
POTASSIUM SERPL-SCNC: 4.4 MMOL/L (ref 3.5–5.1)
SODIUM BLD-SCNC: 135 MMOL/L (ref 136–145)
TROPONIN: <0.01 NG/ML

## 2020-04-12 PROCEDURE — 6370000000 HC RX 637 (ALT 250 FOR IP): Performed by: OBSTETRICS & GYNECOLOGY

## 2020-04-12 PROCEDURE — 97116 GAIT TRAINING THERAPY: CPT

## 2020-04-12 PROCEDURE — 84484 ASSAY OF TROPONIN QUANT: CPT

## 2020-04-12 PROCEDURE — 2500000003 HC RX 250 WO HCPCS: Performed by: STUDENT IN AN ORGANIZED HEALTH CARE EDUCATION/TRAINING PROGRAM

## 2020-04-12 PROCEDURE — 80048 BASIC METABOLIC PNL TOTAL CA: CPT

## 2020-04-12 PROCEDURE — 97535 SELF CARE MNGMENT TRAINING: CPT

## 2020-04-12 PROCEDURE — 83735 ASSAY OF MAGNESIUM: CPT

## 2020-04-12 PROCEDURE — 36415 COLL VENOUS BLD VENIPUNCTURE: CPT

## 2020-04-12 PROCEDURE — 97530 THERAPEUTIC ACTIVITIES: CPT

## 2020-04-12 PROCEDURE — 93005 ELECTROCARDIOGRAM TRACING: CPT | Performed by: OBSTETRICS & GYNECOLOGY

## 2020-04-12 PROCEDURE — 2060000000 HC ICU INTERMEDIATE R&B

## 2020-04-12 PROCEDURE — 6360000002 HC RX W HCPCS: Performed by: STUDENT IN AN ORGANIZED HEALTH CARE EDUCATION/TRAINING PROGRAM

## 2020-04-12 PROCEDURE — 2580000003 HC RX 258: Performed by: OBSTETRICS & GYNECOLOGY

## 2020-04-12 PROCEDURE — 6360000002 HC RX W HCPCS: Performed by: OBSTETRICS & GYNECOLOGY

## 2020-04-12 PROCEDURE — 2580000003 HC RX 258: Performed by: STUDENT IN AN ORGANIZED HEALTH CARE EDUCATION/TRAINING PROGRAM

## 2020-04-12 RX ORDER — METOPROLOL TARTRATE 5 MG/5ML
5 INJECTION INTRAVENOUS EVERY 5 MIN PRN
Status: COMPLETED | OUTPATIENT
Start: 2020-04-12 | End: 2020-04-14

## 2020-04-12 RX ORDER — DIGOXIN 0.25 MG/ML
500 INJECTION INTRAMUSCULAR; INTRAVENOUS ONCE
Status: COMPLETED | OUTPATIENT
Start: 2020-04-12 | End: 2020-04-12

## 2020-04-12 RX ORDER — LABETALOL 20 MG/4 ML (5 MG/ML) INTRAVENOUS SYRINGE
5 ONCE
Status: DISCONTINUED | OUTPATIENT
Start: 2020-04-12 | End: 2020-04-13

## 2020-04-12 RX ORDER — SODIUM CHLORIDE 9 MG/ML
INJECTION, SOLUTION INTRAVENOUS
Status: DISPENSED
Start: 2020-04-12 | End: 2020-04-13

## 2020-04-12 RX ORDER — 0.9 % SODIUM CHLORIDE 0.9 %
500 INTRAVENOUS SOLUTION INTRAVENOUS ONCE
Status: COMPLETED | OUTPATIENT
Start: 2020-04-12 | End: 2020-04-12

## 2020-04-12 RX ORDER — MAGNESIUM SULFATE IN WATER 40 MG/ML
2 INJECTION, SOLUTION INTRAVENOUS ONCE
Status: COMPLETED | OUTPATIENT
Start: 2020-04-12 | End: 2020-04-12

## 2020-04-12 RX ADMIN — Medication 10 ML: at 10:46

## 2020-04-12 RX ADMIN — OXYCODONE 10 MG: 5 TABLET ORAL at 21:35

## 2020-04-12 RX ADMIN — METOPROLOL TARTRATE 25 MG: 25 TABLET, FILM COATED ORAL at 21:27

## 2020-04-12 RX ADMIN — ACETAMINOPHEN 650 MG: 325 TABLET ORAL at 14:58

## 2020-04-12 RX ADMIN — SODIUM CHLORIDE 500 ML: 9 INJECTION, SOLUTION INTRAVENOUS at 14:26

## 2020-04-12 RX ADMIN — GABAPENTIN 100 MG: 100 CAPSULE ORAL at 21:27

## 2020-04-12 RX ADMIN — SENNOSIDES AND DOCUSATE SODIUM 1 TABLET: 8.6; 5 TABLET ORAL at 21:28

## 2020-04-12 RX ADMIN — METOPROLOL TARTRATE 5 MG: 5 INJECTION INTRAVENOUS at 14:48

## 2020-04-12 RX ADMIN — GABAPENTIN 100 MG: 100 CAPSULE ORAL at 14:57

## 2020-04-12 RX ADMIN — ASPIRIN 81 MG: 81 TABLET, COATED ORAL at 10:45

## 2020-04-12 RX ADMIN — MAGNESIUM SULFATE IN WATER 2 G: 40 INJECTION, SOLUTION INTRAVENOUS at 16:17

## 2020-04-12 RX ADMIN — ATORVASTATIN CALCIUM 40 MG: 40 TABLET, FILM COATED ORAL at 21:27

## 2020-04-12 RX ADMIN — PANTOPRAZOLE SODIUM 40 MG: 40 TABLET, DELAYED RELEASE ORAL at 09:00

## 2020-04-12 RX ADMIN — DIGOXIN 500 MCG: 250 INJECTION, SOLUTION INTRAMUSCULAR; INTRAVENOUS; PARENTERAL at 17:28

## 2020-04-12 RX ADMIN — OXYCODONE 10 MG: 5 TABLET ORAL at 03:45

## 2020-04-12 RX ADMIN — ENOXAPARIN SODIUM 40 MG: 40 INJECTION SUBCUTANEOUS at 10:45

## 2020-04-12 RX ADMIN — METOPROLOL TARTRATE 25 MG: 25 TABLET, FILM COATED ORAL at 10:45

## 2020-04-12 RX ADMIN — OXYCODONE 10 MG: 5 TABLET ORAL at 08:08

## 2020-04-12 RX ADMIN — GABAPENTIN 100 MG: 100 CAPSULE ORAL at 10:45

## 2020-04-12 RX ADMIN — METOCLOPRAMIDE HYDROCHLORIDE 5 MG: 5 INJECTION INTRAMUSCULAR; INTRAVENOUS at 12:25

## 2020-04-12 RX ADMIN — SODIUM CHLORIDE: 9 INJECTION, SOLUTION INTRAVENOUS at 16:14

## 2020-04-12 RX ADMIN — SENNOSIDES AND DOCUSATE SODIUM 1 TABLET: 8.6; 5 TABLET ORAL at 10:45

## 2020-04-12 RX ADMIN — ONDANSETRON 4 MG: 2 INJECTION INTRAMUSCULAR; INTRAVENOUS at 09:18

## 2020-04-12 ASSESSMENT — PAIN DESCRIPTION - PAIN TYPE
TYPE: SURGICAL PAIN
TYPE: SURGICAL PAIN
TYPE: ACUTE PAIN
TYPE: SURGICAL PAIN
TYPE: SURGICAL PAIN
TYPE: ACUTE PAIN;SURGICAL PAIN

## 2020-04-12 ASSESSMENT — PAIN DESCRIPTION - ORIENTATION
ORIENTATION: RIGHT
ORIENTATION: RIGHT;LOWER

## 2020-04-12 ASSESSMENT — PAIN DESCRIPTION - FREQUENCY
FREQUENCY: CONTINUOUS
FREQUENCY: INTERMITTENT
FREQUENCY: CONTINUOUS

## 2020-04-12 ASSESSMENT — PAIN DESCRIPTION - LOCATION
LOCATION: ABDOMEN

## 2020-04-12 ASSESSMENT — PAIN SCALES - GENERAL
PAINLEVEL_OUTOF10: 7
PAINLEVEL_OUTOF10: 0
PAINLEVEL_OUTOF10: 5
PAINLEVEL_OUTOF10: 8
PAINLEVEL_OUTOF10: 0
PAINLEVEL_OUTOF10: 6
PAINLEVEL_OUTOF10: 5
PAINLEVEL_OUTOF10: 7
PAINLEVEL_OUTOF10: 8
PAINLEVEL_OUTOF10: 8

## 2020-04-12 ASSESSMENT — PAIN DESCRIPTION - ONSET
ONSET: ON-GOING
ONSET: ON-GOING
ONSET: AWAKENED FROM SLEEP

## 2020-04-12 ASSESSMENT — PAIN DESCRIPTION - PROGRESSION
CLINICAL_PROGRESSION: NOT CHANGED
CLINICAL_PROGRESSION: GRADUALLY WORSENING
CLINICAL_PROGRESSION: GRADUALLY WORSENING

## 2020-04-12 ASSESSMENT — PAIN DESCRIPTION - DESCRIPTORS
DESCRIPTORS: ACHING
DESCRIPTORS: ACHING;SHARP
DESCRIPTORS: ACHING;SHARP
DESCRIPTORS: ACHING;DISCOMFORT;CRAMPING

## 2020-04-12 ASSESSMENT — PAIN - FUNCTIONAL ASSESSMENT
PAIN_FUNCTIONAL_ASSESSMENT: ACTIVITIES ARE NOT PREVENTED
PAIN_FUNCTIONAL_ASSESSMENT: ACTIVITIES ARE NOT PREVENTED

## 2020-04-12 NOTE — PROGRESS NOTES
Rapid response called due to pt's heart rate tachy and EKG reading of A-fib with RVR and Unable to perfect serve hospitalist.  /69 pulse 134 O2 92. Pt denies chest pain, only C/O abdominal discomfort. Walked to BR per pt and MDs request. Passing gas, will cont to monitor.

## 2020-04-12 NOTE — PROGRESS NOTES
Patient up to bathroom using laura steady and x1 assist, patient states her left leg is still numb and feels like it is going to give out sometimes, voided x1, assisted back to bed and position of comfort, will give oxycodone for abdominal pain, call light and belongings in reach, will continue to monitor

## 2020-04-12 NOTE — PROGRESS NOTES
Occupational Therapy  Facility/Department: [facility] 5T ORTHO/NEURO  Daily Treatment Note  NAME: Sumaya Vargas  : 1960  MRN: [id number]    Date of Service: 2020    Discharge Recommendations:  Sumaya Vargas scored a 20/24 on the AM-PAC ADL Inpatient form. Current research shows that an AM-PAC score of 18 or greater is typically associated with a discharge to the patient's home setting. Based on the patients AM-PAC score and their current ADL deficits, it is recommended that the patient have 2-3 sessions per week of Occupational Therapy at d/c to increase the patients independence. If patient discharges prior to next session this note will serve as a discharge summary. Please see below for the latest assessment towards goals. HOME HEALTH CARE: LEVEL 3 SAFETY     - Initial home health evaluation to occur within 24-48 hours, in patient home   - Therapy evaluations in home within 24-48 hours of discharge; including DME and home safety   - Frontload therapy 5 days, then 3x a week   - Therapy to evaluate if patient has [address] needs for personal care   -  evaluation within 24-48 hours, includes evaluation of resources and insurance to determine [place], [place], LTC, and Medicaid options      OT Equipment Recommendations  Other: pt has all needed AE    Assessment   Performance deficits / Impairments: Decreased high-level IADLs;Decreased functional mobility ; Decreased endurance;Decreased ADL status; Decreased strength;Decreased balance;Decreased posture;Decreased sensation  Assessment: Pt progressing with therapy, KI donned LLE due to LLE buckling. Pt ambulating CGA with rw and progressing to [id number] for transfers. Pt has 24 hr assist at home from sister/brother in-law. Pt to d/c home with 24 hr assist and Level 3 HHOT. Continue with POC.   Treatment Diagnosis: Impaired ADLs, balance/strength, and mobility  Prognosis: Good  OT Education: OT Role;Transfer Training(don/doff KI, safety at stand to sit CGA. Pt pushed in recliner to stairs. Pt sit to stand CGA and ambulated ~4 ft to stairs at CGA, pt up 2 stairs, down 2 stairs with right handrail at CGA. Pt back in recliner stand to sit CGA. Pt pushed back to room, educated on donning/doffing KI. Pt heavily educated on safety at home and d/c plan. Pt verbalized understanding. Pt sit to stand SBA, stand to sit CGA (to place chair alarm). Call light in reach and chair alarm on. Pain Assessment  Pain Level: 8  Pain Type: Surgical pain  Pain Location: Abdomen  Pain Orientation: Right; Lower  Pain Descriptors: Aching; Sharp  Pain Frequency: Continuous  Pre Treatment Pain Screening  Intervention List: Patient able to continue with treatment;Nurse called to administer meds  Vital Signs  Patient Currently in Pain: Yes   Orientation  Orientation  Overall Orientation Status: Within Normal Limits  Objective    ADL  Grooming: Contact guard assistance(CGA/Min A in stance)  Toileting: Supervision(front hygiene seated care on toilet)        Balance  Sitting Balance: Modified independent   Standing Balance: Minimal assistance(Min A/CGA)  Standing Balance  Time: ~12 min total  Activity: to/from bathroom, in stance to groom, toilet transfer, stairs  Functional Mobility  Functional - Mobility Device: Rolling Walker  Activity: To/from bathroom  Assist Level: Contact guard assistance  Toilet Transfers  Toilet - Technique: Ambulating  Equipment Used: Raised toilet seat with rails  Toilet Transfer: Moderate assistance  Toilet Transfers Comments:  Mod A to due to LOB, CGA from  Bed mobility  Supine to Sit: Modified independent  Scooting: Supervision  Transfers  Sit to stand: Stand by assistance(Min A progressing to SBA)  Stand to sit: Contact guard assistance                       Cognition  Overall Cognitive Status: WNL               Plan   Plan  Times per week: 2-5x  Times per day: Daily  G-Code     OutComes Score                                                  AM-PAC

## 2020-04-12 NOTE — FLOWSHEET NOTE
04/12/20 1532   Encounter Summary   Services provided to: Patient not available  (telephonic)   Crisis   Type Rapid response   Outcome Refused/declined

## 2020-04-12 NOTE — PROGRESS NOTES
Progress Note     Name: Tiesha Arora Room: 2033/1675-47   YOB: 1960 MRN: [id number]   Sex: female CSN: [id number]    LOS: 4   PCP: Garrett Valdes MD   Attending: Ann Gardner DO Admitting: Ann Gardner DO        Subjective:   Doing well. Mild nausea. No emesis. Tolerated lunch and dinner last night and her breakfast this am. However, still no flatus or BM yet. Pain control improved. Did not require IV pain meds last night or this am.  Mccall out and voiding     Physical Exam:   /67   Pulse 77   Temp 98.4 °F (36.9 °C) (Oral)   Resp 18   Ht 5' 9\" (1.753 m)   Wt 184 lb (83.5 kg)   SpO2 95%   BMI 27.17 kg/m²     I/O last 3 completed shifts: In: 1030 [P.O.:1020; I.V.:10]  Out: 1140 [Urine:500; Drains:640]  I/O this shift:  In: -   Out: 75 [Drains:75]      Gen: AAO  Resp: CTAB  CV: RRR  Abd: +BS, soft, nondistended, appropriately tender  Inc: c/d/i, no erythema  BE Serous  Ext: nontender, no evidence of DVT     No Known Allergies    Medications:   Reviewed    Diagnostic:   Reviewed    Labs:   reviewed  Recent Labs     04/11/20  0442   WBC 10.7   HGB 12.4   HCT 36.3        Recent Labs     04/12/20  0438   *   K 4.4      CO2 24   BUN 8   CREATININE 0.6   CALCIUM 8.8     No results for input(s): AST, ALT, BILIDIR, BILITOT, ALKPHOS in the last 72 hours. No results for input(s): INR in the last 72 hours. No results for input(s):  in the last 72 hours.     Assessment:     Patient Active Problem List   Diagnosis Code    Coronary artery disease I25.10    Hyperlipidemia E78.5    HTN (hypertension) I10    Alcohol abuse F10.10    Tobacco abuse Z72.0    Ovarian cancer on left (HCC) C56.2       Impression/Plan:   POD#4  S/p DIAGNOSTIC LAPAROSCOPY,; LAPAROTOMY RESECTION OF A PELVIC MASS, COMPLETE HYSTERECTOMY, BILATERAL SALPINGO-OOPHORECTOMY, PELVIC LYMPHADENECTOMY,  OMENTECTOMY, APPENDECTOMY; RADICAL PARAMETRECTOMY; TUMOR DEBULKING, PLACEMENT OF NEGATIVE WOUND VAC STERILE

## 2020-04-12 NOTE — PROGRESS NOTES
GYNECOLOGIC ONCOLOGY NOTE    Call received from RN about patient's HR. EKG ordered. Afib w/ RVR shown. RR was called. Fluid bolus ordered. When I spoke to her RN, HR still in the 170s. Patient is otherwise asymptomatic    PLAN:  Will give 5mg labetalol IV stat  Upgrade to PCU  Cardiology consulted  Will have vitals rechecked in 20 minutes.      ERLINDA MAYO DO  GYN ONCOLOGY  [facility] [phone]

## 2020-04-13 LAB
ALBUMIN SERPL-MCNC: 2.4 G/DL (ref 3.4–5)
ANION GAP SERPL CALCULATED.3IONS-SCNC: 9 MMOL/L (ref 3–16)
BUN BLDV-MCNC: 8 MG/DL (ref 7–20)
CALCIUM SERPL-MCNC: 8.3 MG/DL (ref 8.3–10.6)
CHLORIDE BLD-SCNC: 105 MMOL/L (ref 99–110)
CO2: 23 MMOL/L (ref 21–32)
CREAT SERPL-MCNC: 0.6 MG/DL (ref 0.6–1.1)
GFR AFRICAN AMERICAN: >60
GFR NON-AFRICAN AMERICAN: >60
GLUCOSE BLD-MCNC: 122 MG/DL (ref 70–99)
LV EF: 58 %
LVEF MODALITY: NORMAL
MAGNESIUM: 1.7 MG/DL (ref 1.8–2.4)
PHOSPHORUS: 3.2 MG/DL (ref 2.5–4.9)
POTASSIUM SERPL-SCNC: 4.1 MMOL/L (ref 3.5–5.1)
SODIUM BLD-SCNC: 137 MMOL/L (ref 136–145)

## 2020-04-13 PROCEDURE — 6360000002 HC RX W HCPCS: Performed by: STUDENT IN AN ORGANIZED HEALTH CARE EDUCATION/TRAINING PROGRAM

## 2020-04-13 PROCEDURE — 36415 COLL VENOUS BLD VENIPUNCTURE: CPT

## 2020-04-13 PROCEDURE — 80069 RENAL FUNCTION PANEL: CPT

## 2020-04-13 PROCEDURE — 99223 1ST HOSP IP/OBS HIGH 75: CPT | Performed by: INTERNAL MEDICINE

## 2020-04-13 PROCEDURE — 2060000000 HC ICU INTERMEDIATE R&B

## 2020-04-13 PROCEDURE — 6370000000 HC RX 637 (ALT 250 FOR IP): Performed by: OBSTETRICS & GYNECOLOGY

## 2020-04-13 PROCEDURE — 6370000000 HC RX 637 (ALT 250 FOR IP): Performed by: STUDENT IN AN ORGANIZED HEALTH CARE EDUCATION/TRAINING PROGRAM

## 2020-04-13 PROCEDURE — 97530 THERAPEUTIC ACTIVITIES: CPT

## 2020-04-13 PROCEDURE — 97535 SELF CARE MNGMENT TRAINING: CPT

## 2020-04-13 PROCEDURE — 6370000000 HC RX 637 (ALT 250 FOR IP): Performed by: INTERNAL MEDICINE

## 2020-04-13 PROCEDURE — 6360000002 HC RX W HCPCS: Performed by: OBSTETRICS & GYNECOLOGY

## 2020-04-13 PROCEDURE — 2580000003 HC RX 258: Performed by: OBSTETRICS & GYNECOLOGY

## 2020-04-13 PROCEDURE — 93306 TTE W/DOPPLER COMPLETE: CPT

## 2020-04-13 PROCEDURE — 83735 ASSAY OF MAGNESIUM: CPT

## 2020-04-13 PROCEDURE — 97116 GAIT TRAINING THERAPY: CPT

## 2020-04-13 RX ORDER — BISACODYL 10 MG
5 SUPPOSITORY, RECTAL RECTAL DAILY PRN
Status: DISCONTINUED | OUTPATIENT
Start: 2020-04-13 | End: 2020-04-15 | Stop reason: HOSPADM

## 2020-04-13 RX ORDER — BISACODYL 10 MG
10 SUPPOSITORY, RECTAL RECTAL ONCE
Status: COMPLETED | OUTPATIENT
Start: 2020-04-13 | End: 2020-04-13

## 2020-04-13 RX ORDER — METOPROLOL TARTRATE 50 MG/1
50 TABLET, FILM COATED ORAL 2 TIMES DAILY
Status: DISCONTINUED | OUTPATIENT
Start: 2020-04-13 | End: 2020-04-15 | Stop reason: HOSPADM

## 2020-04-13 RX ORDER — MAGNESIUM SULFATE IN WATER 40 MG/ML
2 INJECTION, SOLUTION INTRAVENOUS
Status: COMPLETED | OUTPATIENT
Start: 2020-04-13 | End: 2020-04-13

## 2020-04-13 RX ADMIN — SODIUM CHLORIDE: 9 INJECTION, SOLUTION INTRAVENOUS at 09:14

## 2020-04-13 RX ADMIN — METOPROLOL TARTRATE 50 MG: 50 TABLET, FILM COATED ORAL at 21:33

## 2020-04-13 RX ADMIN — SENNOSIDES AND DOCUSATE SODIUM 1 TABLET: 8.6; 5 TABLET ORAL at 08:13

## 2020-04-13 RX ADMIN — GABAPENTIN 100 MG: 100 CAPSULE ORAL at 21:32

## 2020-04-13 RX ADMIN — GABAPENTIN 100 MG: 100 CAPSULE ORAL at 08:13

## 2020-04-13 RX ADMIN — ATORVASTATIN CALCIUM 40 MG: 40 TABLET, FILM COATED ORAL at 21:27

## 2020-04-13 RX ADMIN — BISACODYL 10 MG: 10 SUPPOSITORY RECTAL at 21:33

## 2020-04-13 RX ADMIN — OXYCODONE 10 MG: 5 TABLET ORAL at 08:13

## 2020-04-13 RX ADMIN — SENNOSIDES AND DOCUSATE SODIUM 1 TABLET: 8.6; 5 TABLET ORAL at 21:39

## 2020-04-13 RX ADMIN — ENOXAPARIN SODIUM 40 MG: 40 INJECTION SUBCUTANEOUS at 08:14

## 2020-04-13 RX ADMIN — APIXABAN 5 MG: 5 TABLET, FILM COATED ORAL at 21:33

## 2020-04-13 RX ADMIN — OXYCODONE 5 MG: 5 TABLET ORAL at 14:19

## 2020-04-13 RX ADMIN — PANTOPRAZOLE SODIUM 40 MG: 40 TABLET, DELAYED RELEASE ORAL at 05:25

## 2020-04-13 RX ADMIN — METOPROLOL TARTRATE 25 MG: 25 TABLET, FILM COATED ORAL at 08:13

## 2020-04-13 RX ADMIN — OXYCODONE 5 MG: 5 TABLET ORAL at 19:10

## 2020-04-13 RX ADMIN — Medication 10 ML: at 09:14

## 2020-04-13 RX ADMIN — GABAPENTIN 100 MG: 100 CAPSULE ORAL at 14:12

## 2020-04-13 RX ADMIN — MAGNESIUM SULFATE IN WATER 2 G: 40 INJECTION, SOLUTION INTRAVENOUS at 09:54

## 2020-04-13 RX ADMIN — ASPIRIN 81 MG: 81 TABLET, COATED ORAL at 08:13

## 2020-04-13 RX ADMIN — APIXABAN 5 MG: 5 TABLET, FILM COATED ORAL at 09:53

## 2020-04-13 ASSESSMENT — PAIN DESCRIPTION - LOCATION
LOCATION: ABDOMEN

## 2020-04-13 ASSESSMENT — PAIN SCALES - GENERAL
PAINLEVEL_OUTOF10: 6
PAINLEVEL_OUTOF10: 2
PAINLEVEL_OUTOF10: 8
PAINLEVEL_OUTOF10: 2
PAINLEVEL_OUTOF10: 0
PAINLEVEL_OUTOF10: 6
PAINLEVEL_OUTOF10: 8
PAINLEVEL_OUTOF10: 0

## 2020-04-13 ASSESSMENT — PAIN DESCRIPTION - PAIN TYPE
TYPE: SURGICAL PAIN

## 2020-04-13 ASSESSMENT — PAIN DESCRIPTION - ORIENTATION
ORIENTATION: RIGHT;LOWER
ORIENTATION: MID
ORIENTATION: RIGHT;LOWER

## 2020-04-13 ASSESSMENT — PAIN DESCRIPTION - DESCRIPTORS
DESCRIPTORS: ACHING;SHARP
DESCRIPTORS: SHARP;ACHING
DESCRIPTORS: ACHING

## 2020-04-13 ASSESSMENT — PAIN DESCRIPTION - PROGRESSION: CLINICAL_PROGRESSION: NOT CHANGED

## 2020-04-13 ASSESSMENT — PAIN DESCRIPTION - ONSET
ONSET: ON-GOING
ONSET: ON-GOING
ONSET: GRADUAL

## 2020-04-13 ASSESSMENT — PAIN DESCRIPTION - FREQUENCY
FREQUENCY: CONTINUOUS
FREQUENCY: INTERMITTENT
FREQUENCY: CONTINUOUS

## 2020-04-13 NOTE — PLAN OF CARE
Problem: Falls - Risk of:  Goal: Will remain free from falls  Description: Will remain free from falls  Outcome: Ongoing   Fall precautions in place. Bed alarm activated, low position, wheels locked. Up with assist x 1 with walker. Patient calls appropriately. Call light and belongings within reach. Continue to monitor safety. Problem: Infection:  Goal: Will remain free from infection  Description: Will remain free from infection  Outcome: Ongoing   No s/s of infection. Problem: Daily Care:  Goal: Daily care needs are met  Description: Daily care needs are met  Outcome: Ongoing   Patient able to complete adl's with minimal assist.      Problem: Skin Integrity:  Goal: Skin integrity will stabilize  Description: Skin integrity will stabilize  Outcome: Ongoing   Skin intact. Patient encouraged to reposition, but refuses and states she sleeps better on her back and lying flat. Continue to monitor skin integrity.

## 2020-04-13 NOTE — CONSULTS
[facility]   Cardiac Electrophysiology Consultation   Date: 4/13/2020  Admit Date:  4/8/2020  Reason for Consultation: Postop afib w/ RVR. Hx of MI  Consult Requesting Physician: Benetta Buerger, DO     No chief complaint on file. HPI: Sumaya Vargas is a 61 y.o. with a PMHx significant for CAD s/p stent x2 (1/2008, 8/2008), HTN, HLD, tobacco abuse, alcohol abuse, and ovarian cancer who had presented for surgical intervention for a malignant neoplasm of the R/L ovary. The patient is s/p laparoscopic resection of the pelvic mass, complete hysterectomy, B/L salpingo-oophorectomy. She was doing well postop however on POD #4 she was noted to have Afib with RVR. During this episode, she had nausea, SOB, and palpitations. The symptoms subsided when she returned back to NSR with 5mg metoprolol and 500mcg digoxin. Further it was noted that she was hypomagnesemic at 1.5mg/dL; which was replaced. Since then her telemetry has noted NSR. She states that she is doing well and does not have any complains of chest pain, shortness of breath, palpitations, and nausea. Past Medical History:   Diagnosis Date    Cancer [facility])     ovary    Coronary artery disease 2008    2 stents,     Heart disease     Hyperlipemia     Hypertension     MI, old 2008    2 stents, requiered another stent in 2009         Past Surgical History:   Procedure Laterality Date   [address] 43  2008, 2009    stents X3    HYSTERECTOMY Bilateral 4/8/2020    DIAGNOSTIC LAPAROSCOPY,; LAPAROTOMY RESECTION OF A PELVIC MASS, COMPLETE HYSTERECTOMY, BILATERAL SALPINGO-OOPHORECTOMY, PELVIC LYMPHADENECTOMY,  OMENTECTOMY, APPENDECTOMY; RADICAL PERIMETRECTOMY; TUMOR DEBULKING performed by Benetta Buerger, DO at [address] [place] [address]  2008    TUBAL LIGATION         No Known Allergies    Social History:  Reviewed. reports that she has been smoking cigarettes. She has been smoking about 0.50 packs per day.  She has never used smokeless tobacco. She reports that she does not drink alcohol or use drugs. Family History:  Reviewed. family history includes Heart Disease in her father and mother; High Blood Pressure in her father; Stroke in her paternal grandmother. No premature CAD. Review of System:  All other systems reviewed except for that noted above. Pertinent negatives and positives are:     Objective      · General: negative for fever, chills   · Ophthalmic ROS: negative for - eye pain or loss of vision  · ENT ROS: negative for - headaches, sore throat   · Respiratory: negative for - cough, sputum  · Cardiovascular: Reviewed in HPI  · Gastrointestinal: negative for - abdominal pain, diarrhea, N/V  · Hematology: negative for - bleeding, blood clots, bruising or jaundice  · Genito-Urinary:  negative for - Dysuria or incontinence  · Musculoskeletal: negative for - Joint swelling, muscle pain  · Neurological: negative for - confusion, dizziness, headaches   · Psychiatric: No anxiety, no depression. · Dermatological: negative for - rash    Physical Examination:  Vitals:    20 0803   BP: 110/60   Pulse: 81   Resp: 16   Temp: 98.1 °F (36.7 °C)   SpO2: 96%        Intake/Output Summary (Last 24 hours) at 2020 1227  Last data filed at 2020 1045  Gross per 24 hour   Intake 2153.34 ml   Output 1025 ml   Net 1128.34 ml     In: 1753.3 [P.O.:360; I.V.:1393.3]  Out: 800    Wt Readings from Last 3 Encounters:   20 190 lb 3.2 oz (86.3 kg)   20 186 lb (84.4 kg)   19 220 lb (99.8 kg)     Temp  Av.1 °F (36.7 °C)  Min: 98 °F (36.7 °C)  Max: 98.5 °F (36.9 °C)  Pulse  Av.2  Min: 64  Max: 138  BP  Min: 80/55  Max: 142/76  SpO2  Av.4 %  Min: 92 %  Max: 96 %    · Telemetry: Sinus rhythm   · Constitutional: Alert. Oriented to person, place, and time. No distress. · Head: Normocephalic and atraumatic.    · Mouth/Throat: Lips appear moist. Oropharynx is clear and moist.  · Eyes: Conjunctivae normal. EOM are normal.   · Neck: Neck supple. No lymphadenopathy. No rigidity. No JVD present. · Cardiovascular: Normal rate, regular rhythm. Normal S1&S2. Carotid pulse 2+ bilaterally. · Pulmonary/Chest: Bilateral respiratory sounds present. No respiratory accessory muscle use. No wheezes, No rhonchi. · Abdominal: Soft. Normal bowel sounds present. Abdominal incison with dressing CDI. No distension but generalized abdominal tenderness. No splenomegaly. No hernia. · Musculoskeletal: No tenderness. No edema    · Lymphadenopathy: Has no cervical adenopathy. · Neurological: Alert and oriented. Cranial nerve II-XII grossly intact, No gross deficit to touch. · Skin: Skin is warm and dry. No rash, lesions, ulcerations noted. · Psychiatric: No anxiety nor agitation. Labs:  Reviewed. Recent Labs     04/11/20 0442 04/12/20  0438 04/13/20  0452   * 135* 137   K 5.2* 4.4 4.1    101 105   CO2 23 24 23   PHOS  --   --  3.2   BUN 8 8 8   CREATININE 0.6 0.6 0.6     Recent Labs     04/11/20 0442   WBC 10.7   HGB 12.4   HCT 36.3   MCV 88.8        Lab Results   Component Value Date    TROPONINI <0.01 04/12/2020     No results found for: BNP  No results found for: PROTIME, INR  Lab Results   Component Value Date    CHOL 175 05/22/2014    HDL 46 05/22/2014    HDL 54 03/30/2011    TRIG 174 05/22/2014       Diagnostic and imaging results reviewed. ECG (4/12/2020):   Atrial fibrillation with RVR    Echo (12/10/2017): Overall left ventricular ejection fraction is estimated to be 55-60%. There is mild concentric left ventricular hypertrophy. The left ventricular wall motion is normal.  The diastolic function is impaired and classified as Grade 1  (impaired relaxation). The left atrium is mildly dilated. There is mild mitral regurgitation. Cath: Unable to access but stent placement 1/2008 and 8/2008. I independently reviewed the ECG and telemetry.      Scheduled Meds:   metoprolol tartrate  50 mg Oral BID    apixaban

## 2020-04-13 NOTE — PROGRESS NOTES
Occupational Therapy  Facility/Department: [facility] 5T ORTHO/NEURO  Daily Treatment Note  NAME: Jovany Leal  : 1960  MRN: [id number]    Date of Service: 2020    Discharge Recommendations:    Jovany Leal scored a 20/24 on the AM-PAC ADL Inpatient form. Current research shows that an AM-PAC score of 18 or greater is typically associated with a discharge to the patient's home setting. Based on the patients AM-PAC score and their current ADL deficits, it is recommended that the patient have 2-3 sessions per week of Occupational Therapy at d/c to increase the patients independence. If patient discharges prior to next session this note will serve as a discharge summary. Please see below for the latest assessment towards goals. OT Equipment Recommendations  Other: pt has all needed AE    Assessment   Performance deficits / Impairments: Decreased high-level IADLs;Decreased functional mobility ; Decreased endurance;Decreased ADL status; Decreased strength;Decreased balance;Decreased posture;Decreased sensation  Assessment: Pt demonstrated increased sit to stand transfers and increased standing tolerance during ADLs. Pt demonstrated increased fatigue with seated ADLs, requiring frequent rest breaks. Pt educated on AE during LB dressing, pt required assist to successfully complete. Would benefit from continued practice. Should pt remain in facility goals were updated. Continue OT POC. Treatment Diagnosis: Impaired ADLs, balance/strength, and mobility  OT Education: OT Role;ADL Adaptive Strategies; Equipment  Patient Education: pt would benefit from continued practice on AE  REQUIRES OT FOLLOW UP: Yes  Activity Tolerance  Activity Tolerance: Patient Tolerated treatment well;Patient limited by fatigue  Activity Tolerance: Pt reported mod fatigue after ADL routine, requiring seated rest breaks throughout. Safety Devices  Safety Devices in place: Yes  Type of devices:  All fall risk precautions in

## 2020-04-14 LAB
ALBUMIN SERPL-MCNC: 2.3 G/DL (ref 3.4–5)
ANION GAP SERPL CALCULATED.3IONS-SCNC: 9 MMOL/L (ref 3–16)
BASOPHILS ABSOLUTE: 0 K/UL (ref 0–0.2)
BASOPHILS RELATIVE PERCENT: 0.1 %
BUN BLDV-MCNC: 7 MG/DL (ref 7–20)
CALCIUM SERPL-MCNC: 8.2 MG/DL (ref 8.3–10.6)
CHLORIDE BLD-SCNC: 104 MMOL/L (ref 99–110)
CO2: 23 MMOL/L (ref 21–32)
CREAT SERPL-MCNC: 0.5 MG/DL (ref 0.6–1.1)
EKG ATRIAL RATE: 156 BPM
EKG ATRIAL RATE: 178 BPM
EKG DIAGNOSIS: NORMAL
EKG DIAGNOSIS: NORMAL
EKG Q-T INTERVAL: 222 MS
EKG Q-T INTERVAL: 294 MS
EKG QRS DURATION: 78 MS
EKG QRS DURATION: 78 MS
EKG QTC CALCULATION (BAZETT): 407 MS
EKG QTC CALCULATION (BAZETT): 469 MS
EKG R AXIS: 47 DEGREES
EKG R AXIS: 61 DEGREES
EKG T AXIS: -80 DEGREES
EKG T AXIS: 263 DEGREES
EKG VENTRICULAR RATE: 153 BPM
EKG VENTRICULAR RATE: 202 BPM
EOSINOPHILS ABSOLUTE: 0.2 K/UL (ref 0–0.6)
EOSINOPHILS RELATIVE PERCENT: 2.1 %
GFR AFRICAN AMERICAN: >60
GFR NON-AFRICAN AMERICAN: >60
GLUCOSE BLD-MCNC: 107 MG/DL (ref 70–99)
HCT VFR BLD CALC: 33.3 % (ref 36–48)
HEMOGLOBIN: 11.1 G/DL (ref 12–16)
LYMPHOCYTES ABSOLUTE: 1 K/UL (ref 1–5.1)
LYMPHOCYTES RELATIVE PERCENT: 13.5 %
MAGNESIUM: 1.5 MG/DL (ref 1.8–2.4)
MCH RBC QN AUTO: 29.8 PG (ref 26–34)
MCHC RBC AUTO-ENTMCNC: 33.4 G/DL (ref 31–36)
MCV RBC AUTO: 89.1 FL (ref 80–100)
MONOCYTES ABSOLUTE: 0.5 K/UL (ref 0–1.3)
MONOCYTES RELATIVE PERCENT: 6.9 %
NEUTROPHILS ABSOLUTE: 5.9 K/UL (ref 1.7–7.7)
NEUTROPHILS RELATIVE PERCENT: 77.4 %
PDW BLD-RTO: 13.6 % (ref 12.4–15.4)
PHOSPHORUS: 3 MG/DL (ref 2.5–4.9)
PLATELET # BLD: 404 K/UL (ref 135–450)
PMV BLD AUTO: 8.7 FL (ref 5–10.5)
POTASSIUM SERPL-SCNC: 3.7 MMOL/L (ref 3.5–5.1)
RBC # BLD: 3.74 M/UL (ref 4–5.2)
SODIUM BLD-SCNC: 136 MMOL/L (ref 136–145)
T4 FREE: 1.1 NG/DL (ref 0.9–1.8)
TSH REFLEX: 7.4 UIU/ML (ref 0.27–4.2)
WBC # BLD: 7.7 K/UL (ref 4–11)

## 2020-04-14 PROCEDURE — 6370000000 HC RX 637 (ALT 250 FOR IP): Performed by: INTERNAL MEDICINE

## 2020-04-14 PROCEDURE — 6360000002 HC RX W HCPCS: Performed by: NURSE PRACTITIONER

## 2020-04-14 PROCEDURE — 93010 ELECTROCARDIOGRAM REPORT: CPT | Performed by: INTERNAL MEDICINE

## 2020-04-14 PROCEDURE — 84443 ASSAY THYROID STIM HORMONE: CPT

## 2020-04-14 PROCEDURE — 6370000000 HC RX 637 (ALT 250 FOR IP): Performed by: OBSTETRICS & GYNECOLOGY

## 2020-04-14 PROCEDURE — 2580000003 HC RX 258: Performed by: OBSTETRICS & GYNECOLOGY

## 2020-04-14 PROCEDURE — 84439 ASSAY OF FREE THYROXINE: CPT

## 2020-04-14 PROCEDURE — 2500000003 HC RX 250 WO HCPCS: Performed by: STUDENT IN AN ORGANIZED HEALTH CARE EDUCATION/TRAINING PROGRAM

## 2020-04-14 PROCEDURE — 6370000000 HC RX 637 (ALT 250 FOR IP): Performed by: NURSE PRACTITIONER

## 2020-04-14 PROCEDURE — 6370000000 HC RX 637 (ALT 250 FOR IP): Performed by: STUDENT IN AN ORGANIZED HEALTH CARE EDUCATION/TRAINING PROGRAM

## 2020-04-14 PROCEDURE — 80069 RENAL FUNCTION PANEL: CPT

## 2020-04-14 PROCEDURE — 36415 COLL VENOUS BLD VENIPUNCTURE: CPT

## 2020-04-14 PROCEDURE — 85025 COMPLETE CBC W/AUTO DIFF WBC: CPT

## 2020-04-14 PROCEDURE — 2060000000 HC ICU INTERMEDIATE R&B

## 2020-04-14 PROCEDURE — 93005 ELECTROCARDIOGRAM TRACING: CPT | Performed by: NURSE PRACTITIONER

## 2020-04-14 PROCEDURE — 83735 ASSAY OF MAGNESIUM: CPT

## 2020-04-14 PROCEDURE — 99232 SBSQ HOSP IP/OBS MODERATE 35: CPT | Performed by: NURSE PRACTITIONER

## 2020-04-14 RX ORDER — DIGOXIN 125 MCG
250 TABLET ORAL DAILY
Status: DISCONTINUED | OUTPATIENT
Start: 2020-04-14 | End: 2020-04-15 | Stop reason: HOSPADM

## 2020-04-14 RX ORDER — DILTIAZEM HYDROCHLORIDE 5 MG/ML
20 INJECTION INTRAVENOUS ONCE
Status: DISCONTINUED | OUTPATIENT
Start: 2020-04-14 | End: 2020-04-15 | Stop reason: HOSPADM

## 2020-04-14 RX ORDER — MAGNESIUM SULFATE IN WATER 40 MG/ML
4 INJECTION, SOLUTION INTRAVENOUS ONCE
Status: COMPLETED | OUTPATIENT
Start: 2020-04-14 | End: 2020-04-14

## 2020-04-14 RX ORDER — DIGOXIN 0.25 MG/ML
250 INJECTION INTRAMUSCULAR; INTRAVENOUS ONCE
Status: DISCONTINUED | OUTPATIENT
Start: 2020-04-14 | End: 2020-04-14

## 2020-04-14 RX ORDER — POTASSIUM CHLORIDE 20 MEQ/1
40 TABLET, EXTENDED RELEASE ORAL ONCE
Status: COMPLETED | OUTPATIENT
Start: 2020-04-14 | End: 2020-04-14

## 2020-04-14 RX ADMIN — GABAPENTIN 100 MG: 100 CAPSULE ORAL at 14:13

## 2020-04-14 RX ADMIN — METOPROLOL TARTRATE 5 MG: 5 INJECTION INTRAVENOUS at 09:00

## 2020-04-14 RX ADMIN — METOPROLOL TARTRATE 50 MG: 50 TABLET, FILM COATED ORAL at 20:49

## 2020-04-14 RX ADMIN — ATORVASTATIN CALCIUM 40 MG: 40 TABLET, FILM COATED ORAL at 20:48

## 2020-04-14 RX ADMIN — ASPIRIN 81 MG: 81 TABLET, COATED ORAL at 10:40

## 2020-04-14 RX ADMIN — POTASSIUM CHLORIDE 40 MEQ: 20 TABLET, EXTENDED RELEASE ORAL at 10:38

## 2020-04-14 RX ADMIN — OXYCODONE 10 MG: 5 TABLET ORAL at 14:48

## 2020-04-14 RX ADMIN — DIGOXIN 250 MCG: 125 TABLET ORAL at 12:55

## 2020-04-14 RX ADMIN — SODIUM CHLORIDE: 9 INJECTION, SOLUTION INTRAVENOUS at 23:42

## 2020-04-14 RX ADMIN — GABAPENTIN 100 MG: 100 CAPSULE ORAL at 20:48

## 2020-04-14 RX ADMIN — METOPROLOL TARTRATE 50 MG: 50 TABLET, FILM COATED ORAL at 10:39

## 2020-04-14 RX ADMIN — OXYCODONE 10 MG: 5 TABLET ORAL at 00:56

## 2020-04-14 RX ADMIN — SENNOSIDES AND DOCUSATE SODIUM 1 TABLET: 8.6; 5 TABLET ORAL at 09:57

## 2020-04-14 RX ADMIN — APIXABAN 5 MG: 5 TABLET, FILM COATED ORAL at 20:48

## 2020-04-14 RX ADMIN — MAGNESIUM SULFATE IN WATER 4 G: 40 INJECTION, SOLUTION INTRAVENOUS at 10:43

## 2020-04-14 RX ADMIN — METOPROLOL TARTRATE 5 MG: 5 INJECTION INTRAVENOUS at 08:44

## 2020-04-14 RX ADMIN — APIXABAN 5 MG: 5 TABLET, FILM COATED ORAL at 10:39

## 2020-04-14 RX ADMIN — SODIUM CHLORIDE: 9 INJECTION, SOLUTION INTRAVENOUS at 00:58

## 2020-04-14 RX ADMIN — OXYCODONE 10 MG: 5 TABLET ORAL at 20:49

## 2020-04-14 RX ADMIN — GABAPENTIN 100 MG: 100 CAPSULE ORAL at 10:39

## 2020-04-14 RX ADMIN — SENNOSIDES AND DOCUSATE SODIUM 1 TABLET: 8.6; 5 TABLET ORAL at 20:48

## 2020-04-14 RX ADMIN — PANTOPRAZOLE SODIUM 40 MG: 40 TABLET, DELAYED RELEASE ORAL at 08:03

## 2020-04-14 RX ADMIN — OXYCODONE 10 MG: 5 TABLET ORAL at 08:03

## 2020-04-14 ASSESSMENT — PAIN SCALES - GENERAL
PAINLEVEL_OUTOF10: 8
PAINLEVEL_OUTOF10: 8
PAINLEVEL_OUTOF10: 3
PAINLEVEL_OUTOF10: 8
PAINLEVEL_OUTOF10: 0
PAINLEVEL_OUTOF10: 3
PAINLEVEL_OUTOF10: 9

## 2020-04-14 ASSESSMENT — PAIN DESCRIPTION - ORIENTATION
ORIENTATION: MID
ORIENTATION: RIGHT;MID

## 2020-04-14 ASSESSMENT — PAIN DESCRIPTION - PROGRESSION
CLINICAL_PROGRESSION: NOT CHANGED

## 2020-04-14 ASSESSMENT — PAIN DESCRIPTION - DESCRIPTORS
DESCRIPTORS: ACHING

## 2020-04-14 ASSESSMENT — PAIN DESCRIPTION - FREQUENCY
FREQUENCY: INTERMITTENT

## 2020-04-14 ASSESSMENT — PAIN DESCRIPTION - PAIN TYPE
TYPE: SURGICAL PAIN

## 2020-04-14 ASSESSMENT — PAIN DESCRIPTION - LOCATION
LOCATION: ABDOMEN

## 2020-04-14 ASSESSMENT — PAIN DESCRIPTION - ONSET
ONSET: GRADUAL
ONSET: ON-GOING
ONSET: GRADUAL
ONSET: ON-GOING

## 2020-04-14 NOTE — PROGRESS NOTES
and oriented, thought content appropriate, normal insight  Capillary Refill: Brisk,< 3 seconds   Peripheral Pulses: +2 palpable, equal bilaterally     Labs:     No results for input(s): WBC, HGB, HCT, PLT in the last 72 hours. Recent Labs     04/12/20  0438 04/13/20  0452 04/14/20  0517   * 137 136   K 4.4 4.1 3.7    105 104   CO2 24 23 23   BUN 8 8 7   CREATININE 0.6 0.6 0.5*   CALCIUM 8.8 8.3 8.2*   PHOS  --  3.2 3.0     No results for input(s): AST, ALT, BILIDIR, BILITOT, ALKPHOS in the last 72 hours. No results for input(s): INR in the last 72 hours. Recent Labs     04/12/20  1402   TROPONINI <0.01       Urinalysis:    Lab Results   Component Value Date    NITRU Negative 02/21/2020    WBCUA 11-20 02/21/2020    BACTERIA Rare 02/21/2020    RBCUA 0-2 02/21/2020    BLOODU TRACE-INTACT 02/21/2020    SPECGRAV <=1.005 02/21/2020    GLUCOSEU Negative 02/21/2020       Radiology: I have reviewed the radiology reports with the following interpretation:     No orders to display       ASSESSMENT:    C/Sury Esparza 1106 Problems    Diagnosis Date Noted    Ovarian cancer on left [facility]) [C56.2] 04/08/2020     Atrial fibrillation with RVR, possibly paroxysmal  -Return to RVR this morning, given IV metoprolol 5 mg x 2 with no improvement.   Give Cardizem bolus 20 mg followed by 5 mg/h drip  Discussed with RN to update electrophysiology  -CHADs-VACs score of 4. continue Eliquis  -Cardiology following  -EEG was done, results pending  -Continue telemetry, optimize electrolytes  -Potassium greater than 4, magnesium greater than 2  -Check CBC    Hypomagnesemia  -1.5 this morning, 4 g of been ordered for replacement  -Replace and recheck    Coronary disease status post stents  -Continue aspirin, statin    Hypertension  -Continue monitor blood pressure    Ovarian cancer status post laparoscopic resection of the pelvic mass, complete hysterectomy and bilateral salpingo-oophorectomy  -Management as per oncology    DVT

## 2020-04-14 NOTE — PROGRESS NOTES
GYNECOLOGIC ONCOLOGY NOTE    Message received from RN this am that patient had left leg weakness walking to the bathroom and had an assisted fall to the floor. Did not hit her head. No trauma noted. Given that incident, CM was asked to have [place] re-evaluated for possible DC to ARU instead of DC home. Additionally, she has apparently been in and out of Afib today. Digoxin was added by cardio. ECHO still pending. Given the above, [place] not yet meeting DC criteria. Will re-eval tomorrow. If cleared by cardio, and pending PMR eval, possible DC to inpatient rehab.      ERLINDA MAYO, DO  GYN ONCOLOGY  [facility] [phone]

## 2020-04-14 NOTE — PROGRESS NOTES
PANEL:  Lab Results   Component Value Date    HDL 46 05/22/2014    HDL 54 03/30/2011    LDLCALC 94 05/22/2014    TRIG 174 05/22/2014    TSH 1.72 05/22/2014     LIVER PROFILE:  Lab Results   Component Value Date    AST 23 04/09/2020    AST 24 03/27/2020    ALT 12 04/09/2020    ALT 22 03/27/2020       -----------------------------------------------------------------  Telemetry: Personally reviewed  AF/RVR    Objective:   Vitals: BP (!) 147/70   Pulse 157   Temp 98.5 °F (36.9 °C) (Oral)   Resp 19   Ht 5' 9\" (1.753 m)   Wt 190 lb 3.2 oz (86.3 kg)   SpO2 91%   BMI 28.09 kg/m²   General appearance: alert, appears stated age and cooperative, No acute distress   Eyes: Conjunctiva and pupils normal and reactive  Skin: Skin color, texture, turgor normal. No rashes or ecchymosis. Neck: no JVD, supple, symmetrical, trachea midline   Lungs: , no accessory muscle use, no respiratory distress  Heart: irreg, tachy  Abdomen: soft, non-tender; bowel sounds normal  Extremities: No edema, DP +  Psychiatric: normal insight and affect    Patient Active Problem List:     Coronary artery disease     Hyperlipidemia     HTN (hypertension)     Alcohol abuse     Tobacco abuse     Ovarian cancer on left [facility])        Assessment & Plan:      1. pAF  2. CAD    62 y/o woman with a h/o HTN, HLD, TA, ETOH abuse, ovarian CA, CAD, s/p PCI (1/2008, 8/2008) who p/w a malignant neoplasm of L/R ovary, s/p resection of pelvic mass, SHARDA with BSO, noted to have AF/RVR on postop day #4, returned to NSR with 5 mg IV metoprolol and IV dig. [id number] 3.      pAF  - In and out of AF this am  - On apixaban 5 mg - H&H stable  - On metoprolol 50 mg BID  - Metoprolol 5 mg given IV x 2   - Will add dig po daily  - Keep K+ > 4.0 and Mg > 2.0 - replacement ordered  - Echo - pending  - Will check TSH    CAD  - No S/s  - On ASA, statin  - Consider MPI outpatient - will have patient follow up with primary cards outpatient    Oanh Padilla [address] [facility] Maryjane Cooley

## 2020-04-14 NOTE — PROGRESS NOTES
Patient alert and oriented x4. VSS and patient has remained afebrile throughout shift. Earlier this morning patient had an increase in heart rate and patients heart rate was fluctuating from mid 80s into the 160s. Stat EKG was completed and patient was in A-fib with RVR. Patient was given 10mg of IV metoprolol which brought patient's HR back down into the mid 80s and back into NSR. Patient was given scheduled digoxin and IV mag and vitals have remained stable since. Patient has abdominal lap sites and TRUNG wound vac that are clean dry and intact and free from signs of infection. Patient has BE in left lower quadrant that has drained 450mL of serosanguineous drainage with no issues. Patient is receiving prn oxy for surgical pain. Patient is tolerating low fiber diet with no issues. Patient has had 2 small bowel movements during shift and is urinating with no issues. Will continue to monitor for any changes.      Electronically signed by Edy Wilburn RN on 4/14/2020 at 4:06 PM

## 2020-04-15 ENCOUNTER — HOSPITAL ENCOUNTER (INPATIENT)
Age: 60
LOS: 1 days | Discharge: STILL ADMITTED OR ADMINISTRATIVE | DRG: 274 | End: 2020-04-16
Attending: PHYSICAL MEDICINE & REHABILITATION | Admitting: PHYSICAL MEDICINE & REHABILITATION
Payer: COMMERCIAL

## 2020-04-15 VITALS
HEIGHT: 69 IN | WEIGHT: 190.2 LBS | TEMPERATURE: 97.7 F | HEART RATE: 81 BPM | RESPIRATION RATE: 16 BRPM | OXYGEN SATURATION: 91 % | BODY MASS INDEX: 28.17 KG/M2 | SYSTOLIC BLOOD PRESSURE: 101 MMHG | DIASTOLIC BLOOD PRESSURE: 61 MMHG

## 2020-04-15 PROBLEM — R53.81 DEBILITY: Status: ACTIVE | Noted: 2020-04-15

## 2020-04-15 LAB
ALBUMIN SERPL-MCNC: 2 G/DL (ref 3.4–5)
ALBUMIN SERPL-MCNC: 2.1 G/DL (ref 3.4–5)
ALP BLD-CCNC: 90 U/L (ref 40–129)
ALT SERPL-CCNC: 13 U/L (ref 10–40)
ANION GAP SERPL CALCULATED.3IONS-SCNC: 10 MMOL/L (ref 3–16)
AST SERPL-CCNC: 21 U/L (ref 15–37)
BILIRUB SERPL-MCNC: 0.3 MG/DL (ref 0–1)
BILIRUBIN DIRECT: <0.2 MG/DL (ref 0–0.3)
BILIRUBIN, INDIRECT: ABNORMAL MG/DL (ref 0–1)
BUN BLDV-MCNC: 7 MG/DL (ref 7–20)
CALCIUM SERPL-MCNC: 7.9 MG/DL (ref 8.3–10.6)
CHLORIDE BLD-SCNC: 103 MMOL/L (ref 99–110)
CO2: 23 MMOL/L (ref 21–32)
CREAT SERPL-MCNC: 0.6 MG/DL (ref 0.6–1.1)
GFR AFRICAN AMERICAN: >60
GFR NON-AFRICAN AMERICAN: >60
GLUCOSE BLD-MCNC: 102 MG/DL (ref 70–99)
MAGNESIUM: 1.7 MG/DL (ref 1.8–2.4)
PHOSPHORUS: 3.1 MG/DL (ref 2.5–4.9)
POTASSIUM SERPL-SCNC: 4.6 MMOL/L (ref 3.5–5.1)
SODIUM BLD-SCNC: 136 MMOL/L (ref 136–145)
TOTAL PROTEIN: 4.6 G/DL (ref 6.4–8.2)

## 2020-04-15 PROCEDURE — 2580000003 HC RX 258: Performed by: PHYSICAL MEDICINE & REHABILITATION

## 2020-04-15 PROCEDURE — 83735 ASSAY OF MAGNESIUM: CPT

## 2020-04-15 PROCEDURE — 6370000000 HC RX 637 (ALT 250 FOR IP): Performed by: OBSTETRICS & GYNECOLOGY

## 2020-04-15 PROCEDURE — 6370000000 HC RX 637 (ALT 250 FOR IP): Performed by: PHYSICAL MEDICINE & REHABILITATION

## 2020-04-15 PROCEDURE — 6360000002 HC RX W HCPCS: Performed by: INTERNAL MEDICINE

## 2020-04-15 PROCEDURE — 2580000003 HC RX 258: Performed by: OBSTETRICS & GYNECOLOGY

## 2020-04-15 PROCEDURE — 97116 GAIT TRAINING THERAPY: CPT

## 2020-04-15 PROCEDURE — 97110 THERAPEUTIC EXERCISES: CPT

## 2020-04-15 PROCEDURE — 93005 ELECTROCARDIOGRAM TRACING: CPT | Performed by: NURSE PRACTITIONER

## 2020-04-15 PROCEDURE — 99232 SBSQ HOSP IP/OBS MODERATE 35: CPT | Performed by: NURSE PRACTITIONER

## 2020-04-15 PROCEDURE — 1280000000 HC REHAB R&B

## 2020-04-15 PROCEDURE — 6370000000 HC RX 637 (ALT 250 FOR IP): Performed by: STUDENT IN AN ORGANIZED HEALTH CARE EDUCATION/TRAINING PROGRAM

## 2020-04-15 PROCEDURE — 36415 COLL VENOUS BLD VENIPUNCTURE: CPT

## 2020-04-15 PROCEDURE — 80069 RENAL FUNCTION PANEL: CPT

## 2020-04-15 PROCEDURE — 6370000000 HC RX 637 (ALT 250 FOR IP): Performed by: NURSE PRACTITIONER

## 2020-04-15 PROCEDURE — 80076 HEPATIC FUNCTION PANEL: CPT

## 2020-04-15 RX ORDER — ONDANSETRON 4 MG/1
4 TABLET, ORALLY DISINTEGRATING ORAL EVERY 8 HOURS PRN
Status: CANCELLED | OUTPATIENT
Start: 2020-04-15

## 2020-04-15 RX ORDER — NITROGLYCERIN 0.4 MG/1
0.4 TABLET SUBLINGUAL EVERY 5 MIN PRN
Status: DISCONTINUED | OUTPATIENT
Start: 2020-04-15 | End: 2020-04-16 | Stop reason: HOSPADM

## 2020-04-15 RX ORDER — GABAPENTIN 100 MG/1
100 CAPSULE ORAL 3 TIMES DAILY
Status: CANCELLED | OUTPATIENT
Start: 2020-04-15

## 2020-04-15 RX ORDER — DOCUSATE SODIUM 100 MG/1
100 CAPSULE, LIQUID FILLED ORAL 2 TIMES DAILY
Qty: 60 CAPSULE | Refills: 1 | Status: ON HOLD | OUTPATIENT
Start: 2020-04-15 | End: 2020-04-23

## 2020-04-15 RX ORDER — ONDANSETRON 4 MG/1
4 TABLET, ORALLY DISINTEGRATING ORAL EVERY 8 HOURS PRN
Status: DISCONTINUED | OUTPATIENT
Start: 2020-04-15 | End: 2020-04-16 | Stop reason: HOSPADM

## 2020-04-15 RX ORDER — OXYCODONE HYDROCHLORIDE 5 MG/1
10 TABLET ORAL EVERY 4 HOURS PRN
Status: CANCELLED | OUTPATIENT
Start: 2020-04-15

## 2020-04-15 RX ORDER — PANTOPRAZOLE SODIUM 40 MG/1
40 TABLET, DELAYED RELEASE ORAL
Qty: 30 TABLET | Refills: 3 | Status: ON HOLD | OUTPATIENT
Start: 2020-04-16 | End: 2020-04-23

## 2020-04-15 RX ORDER — SODIUM CHLORIDE 9 MG/ML
INJECTION, SOLUTION INTRAVENOUS CONTINUOUS
Status: CANCELLED | OUTPATIENT
Start: 2020-04-15

## 2020-04-15 RX ORDER — DIGOXIN 250 MCG
250 TABLET ORAL DAILY
Qty: 30 TABLET | Refills: 3 | Status: ON HOLD | OUTPATIENT
Start: 2020-04-15 | End: 2020-04-22 | Stop reason: HOSPADM

## 2020-04-15 RX ORDER — SENNA AND DOCUSATE SODIUM 50; 8.6 MG/1; MG/1
1 TABLET, FILM COATED ORAL 2 TIMES DAILY
Status: DISCONTINUED | OUTPATIENT
Start: 2020-04-15 | End: 2020-04-16 | Stop reason: HOSPADM

## 2020-04-15 RX ORDER — MAGNESIUM SULFATE IN WATER 40 MG/ML
2 INJECTION, SOLUTION INTRAVENOUS ONCE
Status: COMPLETED | OUTPATIENT
Start: 2020-04-15 | End: 2020-04-15

## 2020-04-15 RX ORDER — CALCIUM CARBONATE 200(500)MG
500 TABLET,CHEWABLE ORAL 3 TIMES DAILY PRN
Status: CANCELLED | OUTPATIENT
Start: 2020-04-15

## 2020-04-15 RX ORDER — POLYETHYLENE GLYCOL 3350 17 G/17G
17 POWDER, FOR SOLUTION ORAL DAILY PRN
Status: DISCONTINUED | OUTPATIENT
Start: 2020-04-15 | End: 2020-04-16 | Stop reason: HOSPADM

## 2020-04-15 RX ORDER — SENNA AND DOCUSATE SODIUM 50; 8.6 MG/1; MG/1
1 TABLET, FILM COATED ORAL 2 TIMES DAILY
Status: CANCELLED | OUTPATIENT
Start: 2020-04-15

## 2020-04-15 RX ORDER — METOPROLOL TARTRATE 50 MG/1
50 TABLET, FILM COATED ORAL 2 TIMES DAILY
Status: CANCELLED | OUTPATIENT
Start: 2020-04-15

## 2020-04-15 RX ORDER — ASPIRIN 81 MG/1
81 TABLET ORAL DAILY
Status: CANCELLED | OUTPATIENT
Start: 2020-04-16

## 2020-04-15 RX ORDER — ACETAMINOPHEN 325 MG/1
650 TABLET ORAL EVERY 4 HOURS PRN
Status: CANCELLED | OUTPATIENT
Start: 2020-04-15

## 2020-04-15 RX ORDER — PANTOPRAZOLE SODIUM 40 MG/1
40 TABLET, DELAYED RELEASE ORAL
Status: CANCELLED | OUTPATIENT
Start: 2020-04-16

## 2020-04-15 RX ORDER — OXYCODONE HYDROCHLORIDE 5 MG/1
5 TABLET ORAL EVERY 4 HOURS PRN
Status: CANCELLED | OUTPATIENT
Start: 2020-04-15

## 2020-04-15 RX ORDER — DIGOXIN 125 MCG
250 TABLET ORAL DAILY
Status: DISCONTINUED | OUTPATIENT
Start: 2020-04-16 | End: 2020-04-16

## 2020-04-15 RX ORDER — POLYETHYLENE GLYCOL 3350 17 G/17G
17 POWDER, FOR SOLUTION ORAL DAILY PRN
Status: CANCELLED | OUTPATIENT
Start: 2020-04-15

## 2020-04-15 RX ORDER — GABAPENTIN 100 MG/1
100 CAPSULE ORAL 3 TIMES DAILY
Status: DISCONTINUED | OUTPATIENT
Start: 2020-04-15 | End: 2020-04-16 | Stop reason: HOSPADM

## 2020-04-15 RX ORDER — ASPIRIN 81 MG/1
81 TABLET ORAL DAILY
Status: DISCONTINUED | OUTPATIENT
Start: 2020-04-16 | End: 2020-04-16 | Stop reason: HOSPADM

## 2020-04-15 RX ORDER — SODIUM CHLORIDE 9 MG/ML
INJECTION, SOLUTION INTRAVENOUS CONTINUOUS
Status: DISCONTINUED | OUTPATIENT
Start: 2020-04-15 | End: 2020-04-15

## 2020-04-15 RX ORDER — ONDANSETRON 4 MG/1
4 TABLET, FILM COATED ORAL EVERY 8 HOURS PRN
Qty: 20 TABLET | Refills: 0 | Status: ON HOLD | OUTPATIENT
Start: 2020-04-15 | End: 2020-04-22 | Stop reason: HOSPADM

## 2020-04-15 RX ORDER — OXYCODONE HYDROCHLORIDE 5 MG/1
5 TABLET ORAL EVERY 4 HOURS PRN
Status: DISCONTINUED | OUTPATIENT
Start: 2020-04-15 | End: 2020-04-16 | Stop reason: HOSPADM

## 2020-04-15 RX ORDER — ATORVASTATIN CALCIUM 20 MG/1
40 TABLET, FILM COATED ORAL NIGHTLY
Status: CANCELLED | OUTPATIENT
Start: 2020-04-15

## 2020-04-15 RX ORDER — ACETAMINOPHEN 325 MG/1
650 TABLET ORAL EVERY 4 HOURS PRN
Status: DISCONTINUED | OUTPATIENT
Start: 2020-04-15 | End: 2020-04-16 | Stop reason: HOSPADM

## 2020-04-15 RX ORDER — OXYCODONE HYDROCHLORIDE 5 MG/1
5 TABLET ORAL EVERY 8 HOURS PRN
Qty: 21 TABLET | Refills: 0 | Status: ON HOLD | OUTPATIENT
Start: 2020-04-15 | End: 2020-04-15

## 2020-04-15 RX ORDER — OXYCODONE HYDROCHLORIDE 5 MG/1
10 TABLET ORAL EVERY 4 HOURS PRN
Status: DISCONTINUED | OUTPATIENT
Start: 2020-04-15 | End: 2020-04-16 | Stop reason: HOSPADM

## 2020-04-15 RX ORDER — NITROGLYCERIN 0.4 MG/1
0.4 TABLET SUBLINGUAL EVERY 5 MIN PRN
Status: CANCELLED | OUTPATIENT
Start: 2020-04-15

## 2020-04-15 RX ORDER — METOPROLOL TARTRATE 50 MG/1
50 TABLET, FILM COATED ORAL 2 TIMES DAILY
Status: DISCONTINUED | OUTPATIENT
Start: 2020-04-15 | End: 2020-04-16

## 2020-04-15 RX ORDER — IBUPROFEN 600 MG/1
600 TABLET ORAL EVERY 6 HOURS PRN
Qty: 30 TABLET | Refills: 1 | Status: ON HOLD | OUTPATIENT
Start: 2020-04-15 | End: 2020-04-22 | Stop reason: HOSPADM

## 2020-04-15 RX ORDER — ATORVASTATIN CALCIUM 40 MG/1
40 TABLET, FILM COATED ORAL NIGHTLY
Status: DISCONTINUED | OUTPATIENT
Start: 2020-04-15 | End: 2020-04-16 | Stop reason: HOSPADM

## 2020-04-15 RX ORDER — CALCIUM CARBONATE 200(500)MG
500 TABLET,CHEWABLE ORAL 3 TIMES DAILY PRN
Status: DISCONTINUED | OUTPATIENT
Start: 2020-04-15 | End: 2020-04-16 | Stop reason: HOSPADM

## 2020-04-15 RX ORDER — DIGOXIN 125 MCG
250 TABLET ORAL DAILY
Status: CANCELLED | OUTPATIENT
Start: 2020-04-16

## 2020-04-15 RX ORDER — PANTOPRAZOLE SODIUM 40 MG/1
40 TABLET, DELAYED RELEASE ORAL
Status: DISCONTINUED | OUTPATIENT
Start: 2020-04-16 | End: 2020-04-16 | Stop reason: HOSPADM

## 2020-04-15 RX ADMIN — GABAPENTIN 100 MG: 100 CAPSULE ORAL at 22:29

## 2020-04-15 RX ADMIN — GABAPENTIN 100 MG: 100 CAPSULE ORAL at 14:14

## 2020-04-15 RX ADMIN — OXYCODONE 10 MG: 5 TABLET ORAL at 10:10

## 2020-04-15 RX ADMIN — Medication 10 ML: at 09:04

## 2020-04-15 RX ADMIN — OXYCODONE 10 MG: 5 TABLET ORAL at 01:23

## 2020-04-15 RX ADMIN — PANTOPRAZOLE SODIUM 40 MG: 40 TABLET, DELAYED RELEASE ORAL at 05:49

## 2020-04-15 RX ADMIN — SODIUM CHLORIDE: 9 INJECTION, SOLUTION INTRAVENOUS at 22:30

## 2020-04-15 RX ADMIN — GABAPENTIN 100 MG: 100 CAPSULE ORAL at 09:02

## 2020-04-15 RX ADMIN — APIXABAN 5 MG: 5 TABLET, FILM COATED ORAL at 22:29

## 2020-04-15 RX ADMIN — METOPROLOL TARTRATE 50 MG: 50 TABLET, FILM COATED ORAL at 22:30

## 2020-04-15 RX ADMIN — OXYCODONE 10 MG: 5 TABLET ORAL at 22:30

## 2020-04-15 RX ADMIN — OXYCODONE 10 MG: 5 TABLET ORAL at 05:49

## 2020-04-15 RX ADMIN — MAGNESIUM SULFATE 2 G: 2 INJECTION INTRAVENOUS at 12:25

## 2020-04-15 RX ADMIN — SODIUM CHLORIDE: 9 INJECTION, SOLUTION INTRAVENOUS at 16:48

## 2020-04-15 RX ADMIN — DIGOXIN 250 MCG: 125 TABLET ORAL at 08:58

## 2020-04-15 RX ADMIN — APIXABAN 5 MG: 5 TABLET, FILM COATED ORAL at 08:58

## 2020-04-15 RX ADMIN — SENNOSIDES AND DOCUSATE SODIUM 1 TABLET: 8.6; 5 TABLET ORAL at 08:58

## 2020-04-15 RX ADMIN — OXYCODONE 10 MG: 5 TABLET ORAL at 14:14

## 2020-04-15 RX ADMIN — ASPIRIN 81 MG: 81 TABLET, COATED ORAL at 09:02

## 2020-04-15 RX ADMIN — ATORVASTATIN CALCIUM 40 MG: 40 TABLET, FILM COATED ORAL at 22:30

## 2020-04-15 ASSESSMENT — PAIN SCALES - GENERAL
PAINLEVEL_OUTOF10: 8
PAINLEVEL_OUTOF10: 8
PAINLEVEL_OUTOF10: 3
PAINLEVEL_OUTOF10: 7
PAINLEVEL_OUTOF10: 3
PAINLEVEL_OUTOF10: 8
PAINLEVEL_OUTOF10: 8
PAINLEVEL_OUTOF10: 0
PAINLEVEL_OUTOF10: 10

## 2020-04-15 ASSESSMENT — PAIN DESCRIPTION - PAIN TYPE
TYPE: SURGICAL PAIN
TYPE: SURGICAL PAIN

## 2020-04-15 ASSESSMENT — PAIN DESCRIPTION - DESCRIPTORS
DESCRIPTORS: ACHING
DESCRIPTORS: ACHING

## 2020-04-15 ASSESSMENT — PAIN DESCRIPTION - FREQUENCY
FREQUENCY: CONTINUOUS
FREQUENCY: CONTINUOUS

## 2020-04-15 ASSESSMENT — PAIN DESCRIPTION - PROGRESSION: CLINICAL_PROGRESSION: NOT CHANGED

## 2020-04-15 ASSESSMENT — PAIN DESCRIPTION - ORIENTATION
ORIENTATION: MID
ORIENTATION: MID

## 2020-04-15 ASSESSMENT — PAIN DESCRIPTION - ONSET
ONSET: ON-GOING
ONSET: ON-GOING

## 2020-04-15 ASSESSMENT — PAIN DESCRIPTION - LOCATION
LOCATION: ABDOMEN
LOCATION: ABDOMEN

## 2020-04-15 NOTE — PROGRESS NOTES
Physical Therapy  Facility/Department: [facility] 5T ORTHO/NEURO  Daily Treatment Note  NAME: Romelia Garcia  : 1960  MRN: [id number]    Date of Service: 4/15/2020    Discharge Recommendations:  Romelia Garcia scored a 17/24 on the AM-PAC short mobility form. Current research shows that an AM-PAC score of 17 or less is typically not associated with a discharge to the patient's home setting. Based on the patients AM-PAC score and their current functional mobility deficits, it is recommended that the patient have 5-7 sessions per week of Physical Therapy at d/c to increase the patients independence. If patient discharges prior to next session this note will serve as a discharge summary. Please see below for the latest assessment towards goals. Patient would benefit from continued therapy after discharge   PT Equipment Recommendations  Equipment Needed: No    Assessment   Body structures, Functions, Activity limitations: Decreased functional mobility ; Decreased balance;Decreased strength;Decreased sensation  Assessment: Pt with improved gait distance. 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 Pt continues with poor L quad strength and uses knee immobilizer for increased LE stability. Rec continued inpt PT at d/c. Will follow. Treatment Diagnosis: impaired mobility  Prognosis: Good  Decision Making: Medium Complexity  PT Education: Goals;PT Role;Transfer Training;Gait Training;General Safety; Functional Mobility Training  Patient Education: Pt verbalized understanding  REQUIRES PT FOLLOW UP: Yes  Activity Tolerance  Activity Tolerance: Patient limited by pain; Patient limited by fatigue     Patient Diagnosis(es): The encounter diagnosis was Malignant neoplasm of right ovary ([facility] [address]      has a past medical history of Cancer [facility]), Coronary artery disease, Heart disease, Hyperlipemia, Hypertension, and MI, old.   has a past surgical history that includes Tubal ligation; Cardiac surgery (2008, 2009); sinus surgery (2008); and Hysterectomy (Bilateral, 4/8/2020). Restrictions  Position Activity Restriction  Other position/activity restrictions: Ambulate patient, abdominal binder     Subjective   General  Chart Reviewed: Yes  Additional Pertinent Hx: s/p DIAGNOSTIC LAPAROSCOPY,; LAPAROTOMY RESECTION OF A PELVIC MASS, COMPLETE HYSTERECTOMY, BILATERAL SALPINGO-OOPHORECTOMY, PELVIC LYMPHADENECTOMY,  OMENTECTOMY, APPENDECTOMY; RADICAL PARAMETRECTOMY; TUMOR DEBULKING, PLACEMENT OF NEGATIVE WOUND VAC STERILE DRESSING on 4/8. Referring Practitioner: Calista Boyer DO  Subjective  Subjective: Pt sitting up in recliner and agreeable to PT. Pt c/o L LE weakness and discomfort in L peroneal area near the lateral malleolus. Pain Assessment  Pain Level: 3       Orientation  Orientation  Overall Orientation Status: Within Normal Limits    Objective      Transfers  Sit to Stand: Contact guard assistance  Stand to sit: Contact guard assistance     Ambulation 1  Device: Rolling Walker  Other Apparatus: Knee Immobilizer(L LE)  Assistance: Contact guard assistance;Minimal assistance  Quality of Gait: Flexed trunk  Gait Deviations: Slow Cecily;Decreased step length;Decreased step height  Distance: 150 feet     Exercises  Straight Leg Raise: L X 10 with max assist  Quad Sets: L x 5  Heelslides: L x 10 with min assist  Hip Abduction: L x 10 with min assist  Knee Long Arc Quad: Unable to perform  Ankle Pumps: B x 10     Goals  Short term goals  Time Frame for Short term goals: dc   ongoing 4/13  Short term goal 1: Bed Mobility min a of 1 MET 4/10 Updated goal: independent  ONGOING  Short term goal 2: Transfers min A of 1 ongoing - goal met 4/12; new goal for patient to perform sit<>stand transfers with SBA  ONGOING  Short term goal 3: Stand 1 min with walker with min A of 1  MET 4/15  Short term goal 4:  Increase L quad strength to 3/5  ONGOING  Short term goal 5: NEW GOAL: patient will ambulate 27' with FWW and SBA  ONGOING  Patient Goals   Patient goals : [place] with [place]    Plan    Plan  Times per week: 2-5  Current Treatment Recommendations: Transfer Training, Strengthening, ROM, Balance Training, Gait Training, Functional Mobility Training, Safety Education & Training, Patient/Caregiver Education & Training, Stair training  Safety Devices  Type of devices: Left in chair, Chair alarm in place, Call light within reach, Nurse notified     Therapy Time   Individual Concurrent Group Co-treatment   Time In 1030         Time Out 1111         Minutes 41           Timed Code Treatment Minutes:  41 Minutes    Total Treatment Minutes:  [address], PT

## 2020-04-15 NOTE — PROGRESS NOTES
mgmt    []  cognitive training            [x]  energy conservation        []  dysphagia tx    []  speech/language/communication therapy   []  group therapy    [x]  patient/family education    [] Other:    CASE MANAGEMENT:  Goals:   Assist patient/family with discharge planning, patient/family counseling,   and coordination with insurance during ARU stay. Admission Period/Goal QM SCORES  QM Admit/Goal Score   Eating CARE Score: 6 / Discharge Goal: Independent   Oral Hygiene CARE Score: 4 / Discharge Goal: Independent   Shower/Bathing CARE Score: 4 / Discharge Goal: Independent   UB Dressing CARE Score: 3 / Discharge Goal: Independent   LB Dressing CARE Score: 4 / Discharge Goal: Independent   Putting on/off Footwear CARE Score: 4 / Discharge Goal: Independent   Toileting Hygiene CARE Score: 4 / Discharge Goal: Independent   Bladder Continence      Bowel Continence      Toilet Transfers CARE Score: 3 / Discharge Goal: Independent   Shower/Bathe Self  CARE Score: 4 / Discharge Goal: Independent   Rolling Left and Right   /     Sit to Lying   /     Lying to Sitting on Bedside CARE Score: 4 / Discharge Goal: Independent   Sit to Stand CARE Score: 4 / Discharge Goal: Independent   Chair/Bed to Chair Transfer   /     Car Transfers   /     Walk 10 Feet   /     Walk 50 Feet with Two Turns   /     Walk 150 Feet   /     Walk 10 Feet on Uneven Surfaces   /     1 Step (Curb)   /     4 Steps   /     12 Steps   /     Picking up Object from Floor   /     Wheel 50 Feet with 2 Turns   /     Type         [] Manual        [] Motorized        [] N/A   Wheel 150 Feet   /     Type         [] Manual        [] Motorized        [] N/A        Yanet Ayon will be seen a minimum of 3 hours of therapy per day, a minimum of 5 out of 7 days per week (please see above for specific treatment plan per PT/OT/SLP).     [] In this rare instance due to the nature of this patient's medical involvement, this patient will be seen 15 hours per week NURSING:  Logan Cordero while on this unit will:  [] Be continent of bowel and bladder     [] Have an adequate number of bowel movements  [] Urinate with no urinary retention >300ml in bladder  [] Complete bladder protocol with velez removal  [] Maintain O2 SATs at ___%  [] Have pain managed while on ARU       [] Be pain free by discharge   [] Have no skin breakdown while on ARU  [] Have improved skin integrity via wound measurements  [] Have no signs/symptoms of infection at the wound site  [] Be free from injury during hospitalization   [] Complete education with patient/family with understanding demonstrated for:  [] Adjustment   [] Other:     Nursing Interventions will include:  [] bowel/bladder training   [] education for medical assistive devices   [] medication education   [] O2 saturation management   [] energy conservation   [] stress management techniques   [] fall prevention   [] alarms protocol   [] seating and positioning   [] skin/wound care   [] pressure relief instruction   [] dressing changes     [] infection protection   [] DVT prophylaxis  [] assistance with in room safety with transfers to bed, toilet, wheelchair, shower   [] bathroom activities and hygiene  [] Other:    Patient/Caregiver Education for:  [] Disease/sustained injury/management     [] Medication Use  [] Surgical intervention  [] Safety  [] Body mechanics and or joint protection  [] Health maintenance     [] Other:     PHYSICAL THERAPY:  Goals: These goals were reviewed with this patient at the time of assessment and Logan Cordero is in agreement.      Plan of Care: Pt to be seen 5 out of 7 days per week per ARU protocol (*** minutes with PT)                       OCCUPATIONAL THERAPY:  Goals:             Short term goals  Time Frame for Short term goals: STG=LTGs :  Long term goals  Time Frame for Long term goals : 1-2 weeks   Long term goal 1: Pt will complete UB bathing/dressing MOD I Long term goal 2: Pt will complete LB bathing/dressing MOD I   Long term goal 3: Pt will complete toileting, including transfer, MOD I   Long term goal 4: Pt will complete simple meal prep task with supervision   Long term goal 5: Pt will verbalize 3-5 energy conservation techniques to implement during ADLs :  These goals were reviewed with this patient at the time of assessment and Prasad Patterson is in agreement    Plan of Care:  Pt to be seen 5 out of 7 days per week per ARU protocol (*** minutes with OT)       SPEECH THERAPY: Goals will be left blank if speech is not following this patient. Goals:                                                                               Plan of Care:  Pt to be seen 5 out of 7 days per week per ARU protocol (*** minutes with SLP)    Therapy Treatments will include:  []  therapeutic exercises    []  gait training     []  neuromuscular re-ed                            []  transfer training             [] community reintegration    [] bed mobility                          []  w/c mobility and training  []  self care    []home mgmt    []  cognitive training            []  energy conservation        []  dysphagia tx    []  speech/language/communication therapy   []  group therapy    []  patient/family education    [] Other:    CASE MANAGEMENT:  Goals:   Assist patient/family with discharge planning, patient/family counseling,   and coordination with insurance during ARU stay.     Admission Period/Goal QM SCORES  QM Admit/Goal Score   Eating CARE Score: 6 / Discharge Goal: Independent   Oral Hygiene CARE Score: 4 / Discharge Goal: Independent   Shower/Bathing CARE Score: 4 / Discharge Goal: Independent   UB Dressing CARE Score: 3 / Discharge Goal: Independent   LB Dressing CARE Score: 4 / Discharge Goal: Independent   Putting on/off Footwear CARE Score: 4 / Discharge Goal: Independent   Toileting Hygiene CARE Score: 4 / Discharge Goal: Independent   Bladder Continence      Bowel

## 2020-04-15 NOTE — ADT AUTH CERT
Utilization Reviews         Laparotomy for Gynecologic Surgery, Including Myomectomy, Oophorectomy, and Salpingectomy - Care Day 7 (4/14/2020) by Frank Fonseca RN         Review Status Review Entered   Completed 4/15/2020 07:58       Criteria Review      Care Day: 7 Care Date: 4/14/2020 Level of Care: Intermediate Care    Guideline Day 3    Level Of Care    ( ) Floor to discharge    4/15/2020 7:58 AM EDT by Marcelino Abarca no dc 4-14/ moved to pcu on 4-12    Clinical Status    ( ) * Hemodynamic stability    4/15/2020 7:58 AM EDT by Faith Reed      hr up to 162/ required two iv doses of lopressor 5 mg to lower/    (X) * No evidence of postoperative or surgical site infection    4/15/2020 7:58 AM EDT by Faith Reed      yes    (X) * Pain absent or managed    4/15/2020 7:58 AM EDT by Faith Reed      recd four po doses of oxy on this day    ( ) * Discharge plans and education understood    Activity    ( ) * Ambulatory [I]    4/15/2020 7:58 AM EDT by Marcelino Abarca not able to tolerate 3 hrs therpy for acute rehab at this time  am pac score of 18/24    Routes    ( ) * Oral hydration, medications, and diet    4/15/2020 7:58 AM EDT by Faith Reed      iv mag sulfate 4 mgx 1/ iv fluids at 75 perhour/ iv lopressor 5 mg x 2 for accelerated hr    ( ) Advance diet    4/15/2020 7:58 AM EDT by Faith Reed      tolerating low fiber diet    * Milestone   Additional Notes   2-14 :59 y.o. female who we are asked to see/evaluate by Kentrell Corey DO for medical management of tachycardia   - PMHx coronary disease status post stents, hypertension, tobacco abuse, alcohol abuse, ovarian cancer when she presented to the hospital for surgical management of her ovarian cancer.    - s/p laparoscopic resection of the pelvic mass, complete hysterectomy, bilateral salpingo-oophorectomy.     - She has been recovering postop and then developed tachycardia.     - Due to these complaints internal medicine was consulted along with cardiology.        This morning with elevared HR with some nausea but now doing well       REVIEW OF SYSTEMS:   Pertinent positives as noted in the HPI. All other systems reviewed and negative.       PHYSICAL EXAM PERFORMED:   /73   Pulse 149   Temp 98.2 °F (36.8 °C) (Oral)   Resp 18   Ht 5' 9\" (1.753 m)   Wt 190 lb 3.2 oz (86.3 kg)   SpO2 93%   BMI 28.09 kg/m²        · Ovarian cancer on left [facility]) [C56.2] 04/08/2020       Atrial fibrillation with RVR, possibly paroxysmal   -Return to RVR this morning, given IV metoprolol 5 mg x 2 with no improvement.    Give Cardizem bolus 20 mg followed by 5 mg/h drip   Discussed with RN to update electrophysiology   -CHADs-VACs score of 4. continue Eliquis   -Cardiology following   -EEG was done, results pending   -Continue telemetry, optimize electrolytes   -Potassium greater than 4, magnesium greater than 2   -Check CBC       Hypomagnesemia   -1.5 this morning, 4 g of been ordered for replacement   -Replace and recheck       Coronary disease status post stents   -Continue aspirin, statin       Hypertension   -Continue monitor blood pressure       Ovarian cancer status post laparoscopic resection of the pelvic mass, complete hysterectomy and bilateral salpingo-oophorectomy   -Management as per oncology       DVT Prophylaxis: eliquis   Diet: DIET LOW FIBER;   Code Status: Full Code       PT/OT Eval Status: Active and ongoing       Dispo - as per primary team   Results for Fidencio Jacome (MRN [id number]) as of 4/15/2020 07:59      4/14/2020 05:17   Sodium: 136   Potassium: 3.7   Chloride: 104   CO2: 23   BUN: 7   Creatinine: 0.5 (L)   Anion Gap: 9   GFR Non-: >60   GFR African American: >60   Magnesium: 1.50 (L)   Glucose: 107 (H)   Calcium: 8.2 (L)   Phosphorus: 3.0   Albumin: 2.3 (L)   TSH: 7.40 (H)   Results for Fidencio Jacome (MRN [id number]) as of 4/15/2020 07:59      4/14/2020 05:17

## 2020-04-15 NOTE — CARE COORDINATION
Case Management Assessment           Daily Note                 Date/ Time of Note: 4/10/2020 4:00 PM         Note completed by: Yohannes Dykes    Patient Name: Luis Rollins  YOB: 1960    Diagnosis:Malignant neoplasm of right ovary ([address] [C56.1]  Ovarian cancer on left [facility]) [C56.2]  Patient Admission Status: Inpatient    Date of Gracia Amato  8:18 AM Length of Stay: 2 GLOS:        Current Plan of Care: return to home at d/c  ________________________________________________________________________________________  PT AM-PAC: 10 / 24 per last evaluation on:04/10    OT AM-PAC: 17 / 24 per last evaluation on: 04/10    DME Needs for discharge: walker    ________________________________________________________________________________________  Discharge Plan: To Be Determined DUE TO: should d/c to home with home PT    Tentative discharge date: 04/13    Current barriers to discharge: numbness/weakness in leg/not medically ready    Referrals completed: Not Applicable    Resources/ information provided: Not indicated at this time  ________________________________________________________________________________________  Case Management Notes:messaged Dr. Glover Divers to see if I should discuss rehab options with patient but pt should be ready by Monday and should d/c to home with home PT at that time. Whitley Molina and her family were provided with choice of provider; she and her family are in agreement with the discharge plan.     Care Transition Patient: Kell Dykes RN  [facility]  Case Management Department  Ph: [phone]  Fax: [phone]
Case Management Assessment           Daily Note                 Date/ Time of Note: 4/14/2020 4:12 PM         Note completed by: Orquidea Chavez    Patient Name: Brock Dougherty  YOB: 1960    Diagnosis:Malignant neoplasm of right ovary ([address] [C56.1]  Ovarian cancer on left [facility]) [C56.2]  Patient Admission Status: Inpatient    Date of Portia Taalonzo  8:18 AM Length of Stay: 6 GLOS:      Current Plan of Care: d/c to [facility] when ready  ________________________________________________________________________________________  PT AM-PAC: 18 / 24 per last evaluation on: 04/13    OT AM-PAC: 20 / 24 per last evaluation on: 04/13    DME Needs for discharge: wheeled walker  ________________________________________________________________________________________  Discharge Plan: Inpatient Rehab: [facility]    Tentative discharge date: TBD    Current barriers to discharge: a-fib/BP elevated today    Referrals completed: Not Applicable    Resources/ information provided: Not indicated at this time  ________________________________________________________________________________________  Case Management Notes:received a message from Dr. Abdias Moe today. He is concerned about the patient returning to home since her knee buckled while up with the nurse this am. I called Mana in admissions and she is able to accept. Spoke with patient who was pretty much agreeable but at the time wanted to speak with her spouse. Since then she has also spoken with Dr. Abdias Moe and Dr. Tc Kilpatrick from [facility]. Merced Reed and her family were provided with choice of provider; she and her family are in agreement with the discharge plan.     Care Transition Patient: Kell Chavez RN  [facility] [employer]  Case Management Department  Ph: [phone]  Fax: [phone]
- P [phone] Priscilla Aguiar [phone]  [address]  Phone: [phone] Fax: [phone] [facility], [address] [place] [address]  [facility] [address]  [address] [facility] [address]  Phone: [phone] Fax: [phone]      Potential assistance Purchasing Medications: Potential Assistance Purchasing Medications: No  Does Patient want to participate in local refill/ meds to beds program?: Yes    Meds To Beds General Rules:  1. Can ONLY be done Monday- Friday between 8:30am-5pm  2. Prescription(s) must be in pharmacy by 3pm to be filled same day  3. Copy of patient's insurance/ prescription drug card and patient face sheet must be sent along with the prescription(s)  4. Cost of Rx cannot be added to hospital bill. If financial assistance is needed, please contact unit  or ;  or  CANNOT provide pharmacy voucher for patients co-pays  5.  Patients can then  the prescription on their way out of the hospital at discharge, or pharmacy can deliver to the bedside if staff is available. (payment due at time of pick-up or delivery - cash, check, or card accepted)     Able to afford home medications/ co-pay costs: Yes    ADLS  Support Systems: Family Members, Spouse/Significant Other    PT AM-PAC:   /24  OT AM-PAC:   /24    [place]: single level house  Steps: 3    Plans to RETURN to current housing: Yes  Barriers to RETURNING to current housing: none noted        DISCHARGE PLAN:  Disposition: TBD    Transportation PLAN for discharge: family     Factors facilitating achievement of predicted outcomes: Family support, Cooperative and Pleasant    Barriers to discharge: Pain    Additional Case Management Notes: NA    The Plan for Transition of Care is related to the following treatment goals of Malignant neoplasm of right ovary [address] [address] [C56.1]  Ovarian cancer on left ([address]
data associated with the providers.  Yes    Care Transitions patient: No    Luma Vasquez RN  [facility]  Case Management Department  Ph: [phone]  Fax:

## 2020-04-15 NOTE — PROGRESS NOTES
LUZ reported to this nurse pt had rhythm change of junctional.  Pt asymptomatic with vital signs within normal limits. Pt denied pain or discomfort. Placed perfect serve message to [facility] NP with cards to report. Awaiting return message at this time .  Electronically signed by Yasmeen Kothari RN on 4/15/2020 at 2:35 PM

## 2020-04-15 NOTE — DISCHARGE INSTR - COC
***    Patient's personal belongings (please select all that are sent with patient):  Glasses    RN SIGNATURE:  Electronically signed by Yasmeen Kothari RN on 4/15/20 at 12:14 PM EDT    CASE MANAGEMENT/SOCIAL WORK SECTION    Inpatient Status Date: ***    Readmission Risk Assessment Score:  Readmission Risk              Risk of Unplanned Readmission:        14           Discharging to Facility/ Agency   · Name:   · Address:  · Phone:  · Fax:    Dialysis Facility (if applicable)   · Name:  · Address:  · Dialysis Schedule:  · Phone:  · Fax:    / signature: {Esignature:[id number]}    PHYSICIAN SECTION    Prognosis: {Prognosis:8119864963}    Condition at Discharge: Jeff8 Yajaira Ugalde Patient Condition:604614027}    Rehab Potential (if transferring to Rehab): {Prognosis:7315690423}    Recommended Labs or Other Treatments After Discharge: ***    Physician Certification: I certify the above information and transfer of Marquis Martines  is necessary for the continuing treatment of the diagnosis listed and that she requires {Admit to Appropriate Level of Care:63501} for {GREATER/LESS:406421921} 30 days.      Update Admission H&P: {CHP DME Changes in [facility]:671086058}    PHYSICIAN SIGNATURE:  {Esignature:[id number]}

## 2020-04-15 NOTE — PROGRESS NOTES
A complete drug regimen review was completed for this patient.      []  No clinically significant medication issue was identified    []   Yes, a clinically significant medication issue was identified     []  Adverse Drug Event:      []  Allergy:      []  Side Effect:      []  Ineffective Therapy:      []  Drug Interaction:     []  Duplicated Therapy:     []  Untreated Indication:      []  Non-adherence:     []  Other:      Nursing/Pharmacy contacted the physician:   Date:   Time:      Actions recommended by physician were completed:  Date :   Time:      Electronically signed by Renetta Antoine RN on 4/15/2020 at 6:15 PM

## 2020-04-15 NOTE — PROGRESS NOTES
Blood Pressure Father     Stroke Paternal Grandmother        Social History     Socioeconomic History    Marital status:      Spouse name: None    Number of children: None    Years of education: None    Highest education level: None   Occupational History    None   Social Needs    Financial resource strain: None    Food insecurity     Worry: None     Inability: None    Transportation needs     Medical: None     Non-medical: None   Tobacco Use    Smoking status: Current Every Day Smoker     Packs/day: 0.50     Types: Cigarettes    Smokeless tobacco: Never Used    Tobacco comment: down to half a pk/day   Substance and Sexual Activity    Alcohol use: No     Alcohol/week: 0.0 standard drinks    Drug use: No    Sexual activity: None   Lifestyle    Physical activity     Days per week: None     Minutes per session: None    Stress: None   Relationships    Social connections     Talks on phone: None     Gets together: None     Attends Denominational service: None     Active member of club or organization: None     Attends meetings of clubs or organizations: None     Relationship status: None    Intimate partner violence     Fear of current or ex partner: None     Emotionally abused: None     Physically abused: None     Forced sexual activity: None   Other Topics Concern    None   Social History Narrative    None           REVIEW OF SYSTEMS:   CONSTITUTIONAL: negative for fevers, chills, diaphoresis, appetite change, night sweats, unexpected weight change, fatigue  EYES: negative for blurred vision, eye discharge, visual disturbance and icterus  HEENT: negative for hearing loss, tinnitus, ear drainage, sinus pressure, nasal congestion, epistaxis and snoring  RESPIRATORY: Negative for hemoptysis, cough, sputum production  CARDIOVASCULAR: negative for chest pain, palpitations, exertional chest pressure/discomfort, syncope, edema  GASTROINTESTINAL: negative for nausea, vomiting, diarrhea, blood in stool, abdominal pain, constipation  GENITOURINARY: negative for frequency, dysuria, urinary incontinence, decreased urine volume, and hematuria  HEMATOLOGIC/LYMPHATIC: negative for easy bruising, bleeding and lymphadenopathy  ALLERGIC/IMMUNOLOGIC: negative for recurrent infections, angioedema, anaphylaxis and drug reactions  ENDOCRINE: negative for weight changes and diabetic symptoms including polyuria, polydipsia and polyphagia  MUSCULOSKELETAL: Positive for pain, joint swelling, decreased range of motion  NEUROLOGICAL: negative for headaches, slurred speech, Positive for unilateral weakness- LLE  PSYCHIATRIC/BEHAVIORAL: negative for hallucinations, behavioral problems, confusion and agitation. Physical Examination:  Vitals:   Patient Vitals for the past 24 hrs:   BP Temp Temp src Pulse Resp SpO2   04/15/20 1346 101/61 97.7 °F (36.5 °C) Axillary 81 16 91 %   04/15/20 1124 106/62 98.3 °F (36.8 °C) Oral 97 17 93 %   04/15/20 0909 96/63 98 °F (36.7 °C) Oral 90 16 92 %   04/15/20 0309 (!) 114/56 98.7 °F (37.1 °C) Oral 90 16 97 %   04/14/20 2304 (!) 97/58 98.3 °F (36.8 °C) Oral 71 16 93 %   04/14/20 2105 102/68 98.2 °F (36.8 °C) Oral 81 16 95 %     Const: Alert. WDWN. No distress  Eyes: Conjunctiva noninjected, no icterus noted; pupils equal, round, and reactive to light. HENT: Atraumatic, normocephalic; Oral mucosa moist  Neck: Trachea midline, neck supple. No thyromegaly noted. CV: Regular rate and rhythm, no murmur rub or gallop noted  Resp: Lungs clear to auscultation bilaterally, no rales wheezes or ronchi, no retractions. Respirations unlabored. GI: Soft, nontender, nondistended. Normal bowel sounds. No palpable masses. Skin: Normal temperature and turgor. No rashes or breakdown noted. Ext: No significant edema appreciated. No varicosities. MSK: No joint tenderness, erythema, warmth noted. PROM intact. Neuro: Alert, oriented, appropriate. No cranial nerve deficits appreciated.  Sensation intact to light touch. Motor examination reveals normal strength in all four limbs diffusely except for the L quad strength 3/5. Reflexes normal and symmetric. Psych: Stable mood, normal judgement, normal affect     Lab Results   Component Value Date    WBC 7.7 04/14/2020    HGB 11.1 (L) 04/14/2020    HCT 33.3 (L) 04/14/2020    MCV 89.1 04/14/2020     04/14/2020     No results found for: INR, PROTIME  Lab Results   Component Value Date    CREATININE 0.6 04/15/2020    BUN 7 04/15/2020     04/15/2020    K 4.6 04/15/2020     04/15/2020    CO2 23 04/15/2020     Lab Results   Component Value Date    ALT 13 04/15/2020    AST 21 04/15/2020    ALKPHOS 90 04/15/2020    BILITOT 0.3 04/15/2020        No orders to display         Assessment:  LLE weakness  -Likely related to surgery- L quad strength is diminished with knee buckling at unexpected times. She is working with therapy with an [facility] score of 17/24. PT/OT in the ARU. Atrial fibrillation with RVR, possibly paroxysmal  - NOW IN NSR  - Continue Metoprolol 50 mg bid, digoxin  -CHADs-VACs score of 4. continue Eliquis     Hypomagnesemia  -monitor and replace     Coronary disease status post stents  -Continue aspirin, statin     Hypertension  -Continue monitor blood pressure     Ovarian cancer status post laparoscopic resection of the pelvic mass, complete hysterectomy and bilateral salpingo-oophorectomy  -Management as per oncology     Impairments- Decreased functional mobility, Decreased ADLs    Recommendations:  ARU admit today    Patient with new functional deficits and ongoing medical complexity. Demonstrates ability to tolerate 3 hours therapy/day. She is a good candidate for acute inpatient rehab when medically appropriate. Thank you for this consult. Please contact me with any questions or concerns.      Santosh Dickson D.O. M.P.H  PM&R  4/15/2020  2:51 PM

## 2020-04-15 NOTE — PLAN OF CARE
Pt has remained from from falls with no safety concerns this shift. Pain is exhists but pt stated is managed with current regimen. Skin remains free from new impairment at this time.   Electronically signed by Tiffani Olivares RN on 4/15/2020 at 2:58 PM

## 2020-04-16 ENCOUNTER — HOSPITAL ENCOUNTER (INPATIENT)
Age: 60
LOS: 6 days | Discharge: NURSING, REHAB OR LONG-TERM CARE | DRG: 274 | End: 2020-04-22
Attending: INTERNAL MEDICINE | Admitting: INTERNAL MEDICINE
Payer: COMMERCIAL

## 2020-04-16 VITALS
OXYGEN SATURATION: 96 % | HEIGHT: 69 IN | WEIGHT: 200.62 LBS | BODY MASS INDEX: 29.71 KG/M2 | SYSTOLIC BLOOD PRESSURE: 105 MMHG | TEMPERATURE: 98 F | DIASTOLIC BLOOD PRESSURE: 72 MMHG | HEART RATE: 70 BPM | RESPIRATION RATE: 18 BRPM

## 2020-04-16 PROBLEM — I48.91 AFIB (HCC): Status: ACTIVE | Noted: 2020-04-16

## 2020-04-16 LAB
ANION GAP SERPL CALCULATED.3IONS-SCNC: 9 MMOL/L (ref 3–16)
BASOPHILS ABSOLUTE: 0 K/UL (ref 0–0.2)
BASOPHILS RELATIVE PERCENT: 0.1 %
BUN BLDV-MCNC: 7 MG/DL (ref 7–20)
CALCIUM SERPL-MCNC: 8.3 MG/DL (ref 8.3–10.6)
CHLORIDE BLD-SCNC: 102 MMOL/L (ref 99–110)
CO2: 24 MMOL/L (ref 21–32)
CREAT SERPL-MCNC: 0.6 MG/DL (ref 0.6–1.1)
EKG ATRIAL RATE: 161 BPM
EKG ATRIAL RATE: 83 BPM
EKG ATRIAL RATE: 98 BPM
EKG DIAGNOSIS: NORMAL
EKG P AXIS: 19 DEGREES
EKG P AXIS: 75 DEGREES
EKG P AXIS: 91 DEGREES
EKG P-R INTERVAL: 122 MS
EKG P-R INTERVAL: 148 MS
EKG P-R INTERVAL: 94 MS
EKG Q-T INTERVAL: 252 MS
EKG Q-T INTERVAL: 310 MS
EKG Q-T INTERVAL: 372 MS
EKG QRS DURATION: 100 MS
EKG QRS DURATION: 80 MS
EKG QRS DURATION: 82 MS
EKG QTC CALCULATION (BAZETT): 395 MS
EKG QTC CALCULATION (BAZETT): 412 MS
EKG QTC CALCULATION (BAZETT): 437 MS
EKG R AXIS: 43 DEGREES
EKG R AXIS: 72 DEGREES
EKG R AXIS: 75 DEGREES
EKG T AXIS: -64 DEGREES
EKG T AXIS: 48 DEGREES
EKG T AXIS: 80 DEGREES
EKG VENTRICULAR RATE: 161 BPM
EKG VENTRICULAR RATE: 83 BPM
EKG VENTRICULAR RATE: 98 BPM
EOSINOPHILS ABSOLUTE: 0.3 K/UL (ref 0–0.6)
EOSINOPHILS RELATIVE PERCENT: 3.4 %
GFR AFRICAN AMERICAN: >60
GFR NON-AFRICAN AMERICAN: >60
GLUCOSE BLD-MCNC: 100 MG/DL (ref 70–99)
HCT VFR BLD CALC: 32.1 % (ref 36–48)
HEMOGLOBIN: 10.7 G/DL (ref 12–16)
LYMPHOCYTES ABSOLUTE: 1.4 K/UL (ref 1–5.1)
LYMPHOCYTES RELATIVE PERCENT: 13.8 %
MCH RBC QN AUTO: 29.3 PG (ref 26–34)
MCHC RBC AUTO-ENTMCNC: 33.2 G/DL (ref 31–36)
MCV RBC AUTO: 88.3 FL (ref 80–100)
MONOCYTES ABSOLUTE: 0.8 K/UL (ref 0–1.3)
MONOCYTES RELATIVE PERCENT: 8.2 %
NEUTROPHILS ABSOLUTE: 7.5 K/UL (ref 1.7–7.7)
NEUTROPHILS RELATIVE PERCENT: 74.5 %
PDW BLD-RTO: 13.8 % (ref 12.4–15.4)
PLATELET # BLD: 458 K/UL (ref 135–450)
PMV BLD AUTO: 8.4 FL (ref 5–10.5)
POTASSIUM REFLEX MAGNESIUM: 4.1 MMOL/L (ref 3.5–5.1)
RBC # BLD: 3.63 M/UL (ref 4–5.2)
SODIUM BLD-SCNC: 135 MMOL/L (ref 136–145)
TROPONIN: <0.01 NG/ML
WBC # BLD: 10 K/UL (ref 4–11)

## 2020-04-16 PROCEDURE — 93010 ELECTROCARDIOGRAM REPORT: CPT | Performed by: INTERNAL MEDICINE

## 2020-04-16 PROCEDURE — 99233 SBSQ HOSP IP/OBS HIGH 50: CPT | Performed by: NURSE PRACTITIONER

## 2020-04-16 PROCEDURE — 36415 COLL VENOUS BLD VENIPUNCTURE: CPT

## 2020-04-16 PROCEDURE — 6370000000 HC RX 637 (ALT 250 FOR IP): Performed by: NURSE PRACTITIONER

## 2020-04-16 PROCEDURE — 6370000000 HC RX 637 (ALT 250 FOR IP): Performed by: INTERNAL MEDICINE

## 2020-04-16 PROCEDURE — 1200000000 HC SEMI PRIVATE

## 2020-04-16 PROCEDURE — 97535 SELF CARE MNGMENT TRAINING: CPT

## 2020-04-16 PROCEDURE — 97166 OT EVAL MOD COMPLEX 45 MIN: CPT

## 2020-04-16 PROCEDURE — 84484 ASSAY OF TROPONIN QUANT: CPT

## 2020-04-16 PROCEDURE — 6360000002 HC RX W HCPCS

## 2020-04-16 PROCEDURE — 80048 BASIC METABOLIC PNL TOTAL CA: CPT

## 2020-04-16 PROCEDURE — 93005 ELECTROCARDIOGRAM TRACING: CPT | Performed by: NURSE PRACTITIONER

## 2020-04-16 PROCEDURE — 85025 COMPLETE CBC W/AUTO DIFF WBC: CPT

## 2020-04-16 PROCEDURE — 2500000003 HC RX 250 WO HCPCS

## 2020-04-16 PROCEDURE — 93005 ELECTROCARDIOGRAM TRACING: CPT | Performed by: INTERNAL MEDICINE

## 2020-04-16 PROCEDURE — 2580000003 HC RX 258: Performed by: INTERNAL MEDICINE

## 2020-04-16 PROCEDURE — 6370000000 HC RX 637 (ALT 250 FOR IP): Performed by: PHYSICAL MEDICINE & REHABILITATION

## 2020-04-16 RX ORDER — ONDANSETRON 2 MG/ML
4 INJECTION INTRAMUSCULAR; INTRAVENOUS EVERY 6 HOURS PRN
Status: DISCONTINUED | OUTPATIENT
Start: 2020-04-16 | End: 2020-04-18

## 2020-04-16 RX ORDER — SODIUM CHLORIDE 0.9 % (FLUSH) 0.9 %
10 SYRINGE (ML) INJECTION EVERY 12 HOURS SCHEDULED
Status: DISCONTINUED | OUTPATIENT
Start: 2020-04-16 | End: 2020-04-21

## 2020-04-16 RX ORDER — 0.9 % SODIUM CHLORIDE 0.9 %
500 INTRAVENOUS SOLUTION INTRAVENOUS ONCE
Status: COMPLETED | OUTPATIENT
Start: 2020-04-16 | End: 2020-04-17

## 2020-04-16 RX ORDER — AMIODARONE HYDROCHLORIDE 200 MG/1
200 TABLET ORAL 2 TIMES DAILY
Status: DISCONTINUED | OUTPATIENT
Start: 2020-04-16 | End: 2020-04-18

## 2020-04-16 RX ORDER — SODIUM CHLORIDE 0.9 % (FLUSH) 0.9 %
10 SYRINGE (ML) INJECTION PRN
Status: DISCONTINUED | OUTPATIENT
Start: 2020-04-16 | End: 2020-04-21

## 2020-04-16 RX ORDER — AMIODARONE HYDROCHLORIDE 200 MG/1
200 TABLET ORAL 2 TIMES DAILY
Status: DISCONTINUED | OUTPATIENT
Start: 2020-04-16 | End: 2020-04-16

## 2020-04-16 RX ORDER — POLYETHYLENE GLYCOL 3350 17 G/17G
17 POWDER, FOR SOLUTION ORAL DAILY PRN
Status: DISCONTINUED | OUTPATIENT
Start: 2020-04-16 | End: 2020-04-22 | Stop reason: HOSPADM

## 2020-04-16 RX ORDER — SOTALOL HYDROCHLORIDE 80 MG/1
80 TABLET ORAL 2 TIMES DAILY
Status: DISCONTINUED | OUTPATIENT
Start: 2020-04-16 | End: 2020-04-16 | Stop reason: HOSPADM

## 2020-04-16 RX ORDER — NITROGLYCERIN 0.4 MG/1
0.4 TABLET SUBLINGUAL EVERY 5 MIN PRN
Status: DISCONTINUED | OUTPATIENT
Start: 2020-04-16 | End: 2020-04-22 | Stop reason: HOSPADM

## 2020-04-16 RX ORDER — PANTOPRAZOLE SODIUM 40 MG/1
40 TABLET, DELAYED RELEASE ORAL
Status: DISCONTINUED | OUTPATIENT
Start: 2020-04-17 | End: 2020-04-22 | Stop reason: HOSPADM

## 2020-04-16 RX ORDER — ATORVASTATIN CALCIUM 40 MG/1
40 TABLET, FILM COATED ORAL DAILY
Status: DISCONTINUED | OUTPATIENT
Start: 2020-04-17 | End: 2020-04-22 | Stop reason: HOSPADM

## 2020-04-16 RX ORDER — ACETAMINOPHEN 650 MG/1
650 SUPPOSITORY RECTAL EVERY 6 HOURS PRN
Status: DISCONTINUED | OUTPATIENT
Start: 2020-04-16 | End: 2020-04-17

## 2020-04-16 RX ORDER — METOPROLOL SUCCINATE 25 MG/1
25 TABLET, EXTENDED RELEASE ORAL 2 TIMES DAILY
Status: ON HOLD | COMMUNITY
End: 2020-04-22 | Stop reason: HOSPADM

## 2020-04-16 RX ORDER — GABAPENTIN 100 MG/1
100 CAPSULE ORAL 3 TIMES DAILY
Status: DISCONTINUED | OUTPATIENT
Start: 2020-04-16 | End: 2020-04-22 | Stop reason: HOSPADM

## 2020-04-16 RX ORDER — METOPROLOL SUCCINATE 25 MG/1
25 TABLET, EXTENDED RELEASE ORAL 2 TIMES DAILY
Status: DISCONTINUED | OUTPATIENT
Start: 2020-04-16 | End: 2020-04-18

## 2020-04-16 RX ORDER — ACETAMINOPHEN 325 MG/1
650 TABLET ORAL EVERY 6 HOURS PRN
Status: DISCONTINUED | OUTPATIENT
Start: 2020-04-16 | End: 2020-04-17

## 2020-04-16 RX ORDER — PROMETHAZINE HYDROCHLORIDE 12.5 MG/1
12.5 TABLET ORAL EVERY 6 HOURS PRN
Status: DISCONTINUED | OUTPATIENT
Start: 2020-04-16 | End: 2020-04-22 | Stop reason: HOSPADM

## 2020-04-16 RX ADMIN — APIXABAN 5 MG: 5 TABLET, FILM COATED ORAL at 20:50

## 2020-04-16 RX ADMIN — METOPROLOL SUCCINATE 25 MG: 25 TABLET, EXTENDED RELEASE ORAL at 20:50

## 2020-04-16 RX ADMIN — DIGOXIN 250 MCG: 125 TABLET ORAL at 09:21

## 2020-04-16 RX ADMIN — Medication 10 ML: at 20:51

## 2020-04-16 RX ADMIN — GABAPENTIN 100 MG: 100 CAPSULE ORAL at 13:58

## 2020-04-16 RX ADMIN — METOPROLOL TARTRATE 50 MG: 50 TABLET, FILM COATED ORAL at 09:20

## 2020-04-16 RX ADMIN — SODIUM CHLORIDE 500 ML: 9 INJECTION, SOLUTION INTRAVENOUS at 22:58

## 2020-04-16 RX ADMIN — BISACODYL 5 MG: 5 TABLET, COATED ORAL at 09:20

## 2020-04-16 RX ADMIN — AMIODARONE HYDROCHLORIDE 200 MG: 200 TABLET ORAL at 09:24

## 2020-04-16 RX ADMIN — GABAPENTIN 100 MG: 100 CAPSULE ORAL at 09:20

## 2020-04-16 RX ADMIN — OXYCODONE 10 MG: 5 TABLET ORAL at 12:24

## 2020-04-16 RX ADMIN — AMIODARONE HYDROCHLORIDE 200 MG: 200 TABLET ORAL at 20:50

## 2020-04-16 RX ADMIN — SENNOSIDES AND DOCUSATE SODIUM 1 TABLET: 8.6; 5 TABLET ORAL at 09:20

## 2020-04-16 RX ADMIN — GABAPENTIN 100 MG: 100 CAPSULE ORAL at 20:50

## 2020-04-16 RX ADMIN — ASPIRIN 81 MG: 81 TABLET, COATED ORAL at 09:20

## 2020-04-16 RX ADMIN — PANTOPRAZOLE SODIUM 40 MG: 40 TABLET, DELAYED RELEASE ORAL at 06:03

## 2020-04-16 RX ADMIN — OXYCODONE 10 MG: 5 TABLET ORAL at 06:02

## 2020-04-16 RX ADMIN — APIXABAN 5 MG: 5 TABLET, FILM COATED ORAL at 09:21

## 2020-04-16 ASSESSMENT — PAIN DESCRIPTION - PROGRESSION: CLINICAL_PROGRESSION: NOT CHANGED

## 2020-04-16 ASSESSMENT — PAIN SCALES - GENERAL
PAINLEVEL_OUTOF10: 8
PAINLEVEL_OUTOF10: 5
PAINLEVEL_OUTOF10: 8
PAINLEVEL_OUTOF10: 8
PAINLEVEL_OUTOF10: 10

## 2020-04-16 ASSESSMENT — PAIN DESCRIPTION - LOCATION
LOCATION: ABDOMEN

## 2020-04-16 ASSESSMENT — PAIN - FUNCTIONAL ASSESSMENT
PAIN_FUNCTIONAL_ASSESSMENT: PREVENTS OR INTERFERES SOME ACTIVE ACTIVITIES AND ADLS

## 2020-04-16 ASSESSMENT — PAIN DESCRIPTION - FREQUENCY
FREQUENCY: CONTINUOUS

## 2020-04-16 ASSESSMENT — PAIN DESCRIPTION - ONSET
ONSET: ON-GOING
ONSET: ON-GOING

## 2020-04-16 ASSESSMENT — PAIN DESCRIPTION - PAIN TYPE
TYPE: SURGICAL PAIN

## 2020-04-16 ASSESSMENT — PAIN DESCRIPTION - DESCRIPTORS
DESCRIPTORS: DULL;PRESSURE
DESCRIPTORS: ACHING
DESCRIPTORS: DULL;SHARP

## 2020-04-16 ASSESSMENT — PAIN DESCRIPTION - ORIENTATION
ORIENTATION: MID;RIGHT
ORIENTATION: MID;RIGHT

## 2020-04-16 NOTE — PROGRESS NOTES
Call back received from on call cardiologist Kilo Oliver. EKG results read. Advised cardiologist this is a non-tele unit and we are not able to administer IV metoprolol. Per cardiology, recheck patients status in about 30 minutes and if no improvement acute to in-patient. MD Davalos made aware. Pt made aware, Pt states they \"don't want to return back to another unit. \"

## 2020-04-16 NOTE — PROGRESS NOTES
Occupational Therapy   Occupational Therapy Initial Assessment/Treatment Note  Date: 2020   Patient Name: Alcides Benoit  MRN: [id number]     : 1960    Date of Service: 2020    Discharge Recommendations:  Home with Home health OT, Home with assist PRN  OT Equipment Recommendations  Equipment Needed: Yes  Other: Will continue to assess- pt has RTS, shower chair for . Assessment   Performance deficits / Impairments: Decreased high-level IADLs;Decreased functional mobility ; Decreased endurance;Decreased ADL status; Decreased strength;Decreased balance;Decreased posture  Assessment: Pt is a 60 y/o female admitted to [facility] for surgical management of ovarian cancer. Pt endorses independence with ADLs, IADLs, and is a caregiver for her . Pt demonstrating functional performance below baseline, pt required CGA for stance at sink and LB dressing, min assist for toilet transfer. Recommending home w/ assist PRN and HHOT at d/c. Pt would benefit from continued skilled OT to promote increased safety and independence with ADL routine. Treatment Diagnosis: Impaired ADLs, balance/strength, and mobility  Prognosis: Good  Decision Making: Medium Complexity  OT Education: OT Role;Plan of Care  REQUIRES OT FOLLOW UP: Yes  Activity Tolerance  Activity Tolerance: Patient Tolerated treatment well;Patient limited by fatigue  Activity Tolerance: Pts HR throughout session: supine 76-77, sitting EOB 74-82, stance 90-94; after shower 81-86, seated in recliner 86-94. Pts BP: supine 104/55, sitting 102/65, stance 103/60, after shower 121/72; Pt reporting mild levels of fatigue at end of session   Safety Devices  Safety Devices in place: Yes  Type of devices: All fall risk precautions in place;Call light within reach; Left in chair;Nurse notified; Chair alarm in place           Patient Diagnosis(es): There were no encounter diagnoses.      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 Pt's sister in law, brother in law, and grandson are staying with pt and  right now. They are able to assist while pt is recovering. Objective   Vision: Impaired  Vision Exceptions: Wears glasses at all times  Hearing: Within functional limits       Observation/Palpation  Posture: Good  Edema: Pt reporting edema at feet       Balance  Sitting Balance: Supervision(seated on TTB)  Standing Balance: Contact guard assistance  Standing Balance  Time: ~10 minutes total   Activity: functional mobility, stance for ADLs      Functional Mobility  Functional - Mobility Device: Rolling Walker  Activity: To/from bathroom  Assist Level: Contact guard assistance  Functional Mobility Comments: VCs for upright posture and LLE placement- pt externally rotating at LLE for mobility   Toilet Transfers  Toilet - Technique: Ambulating  Equipment Used: Standard toilet(+ grab bar )  Toilet Transfer: Minimal assistance  Shower Transfers  Shower - Transfer From: Walker  Shower - Transfer Type: To and From  Shower - Transfer To: Transfer tub bench  Shower - Technique: Ambulating  Shower Transfers: Minimal assistance      ADL  Feeding: Independent  Grooming: Contact guard assistance; Increased time to

## 2020-04-16 NOTE — H&P
Hospital Medicine History & Physical      PCP: Hal Buchanan MD    Date of Admission: 4/16/2020    Date of Service: Pt seen/examined on 04/16/2020 and Admitted to Inpatient with expected LOS greater than two midnights due to medical therapy. Chief Complaint:  Palpitations      History Of Present Illness:      61 y.o. female Logan Cordero with past medical history significant for CAD status post stents in 2008, hypertension, hyperlipidemia, tobacco abuse, alcohol abuse, Ovarian cancer status post laparoscopic resection of the pelvic mass, complete hysterectomy and bilateral salpingo-oophorectomy on 04/08/2020. On postoperative day 4 she was found to have A. fib RVR, she was managed with metoprolol 50 mg twice daily as well as digoxin. She remained sinus rhythm in the hospital and was discharged to acute rehab on 4/15. As me on the evening of 4/15 she did have some palpitations, but after taking her evening Toprol all she was able to go to sleep. Since today morning, she is having palpitations, associate with nausea, worse with working with physical therapy. Denies chest pain. Does not have any lightheadedness dizziness. Labs were checked this morning which showed sodium 135, potassium was 4.1. No magnesium. She was hypomagnesemic during her hospital stay postop, she received 4 g of magnesium on 4/14 and 2 g of magnesium on 4/15. CBC she did not have any leukocytosis, hemoglobin 10.7. EKG done in the rehab unit shows sinus tachycardia/atrial tachycardia with ventricular 161, nonspecific ST and T wave changes. She was transferred to telemetry floor. Electrophysiology been consulted for assistance in management of atrial fibrillation with RVR. On telemetry prior to seeing in the room she had reverted back to normal sinus rhythm with heart rate in 70s.       Past Medical History:          Diagnosis Date    Cancer [facility])     ovary    Coronary artery disease 2008    2 stents,     Heart disease

## 2020-04-16 NOTE — H&P
Gigi Davalos, DO   40 mg at 04/16/20 0603         REVIEW OF SYSTEMS:   CONSTITUTIONAL: negative for fevers, chills, diaphoresis, appetite change, night sweats, unexpected weight change, fatigue  EYES: negative for blurred vision, eye discharge, visual disturbance and icterus  HEENT: negative for hearing loss, tinnitus, ear drainage, sinus pressure, nasal congestion, epistaxis and snoring  RESPIRATORY: Negative for hemoptysis, cough, sputum production  CARDIOVASCULAR: negative for chest pain, palpitations, exertional chest pressure/discomfort, syncope, edema  GASTROINTESTINAL: negative for nausea, vomiting, diarrhea, blood in stool, abdominal pain, constipation  GENITOURINARY: negative for frequency, dysuria, urinary incontinence, decreased urine volume, and hematuria  HEMATOLOGIC/LYMPHATIC: negative for easy bruising, bleeding and lymphadenopathy  ALLERGIC/IMMUNOLOGIC: negative for recurrent infections, angioedema, anaphylaxis and drug reactions  ENDOCRINE: negative for weight changes and diabetic symptoms including polyuria, polydipsia and polyphagia  MUSCULOSKELETAL: Positive for pain, joint swelling, decreased range of motion  NEUROLOGICAL: negative for headaches, slurred speech, Positive for unilateral weakness- LLE  PSYCHIATRIC/BEHAVIORAL: negative for hallucinations, behavioral problems, confusion and agitation.        All pertinent positives are noted in the HPI.     Physical Examination:  Vitals:   Patient Vitals for the past 24 hrs:   BP Temp Temp src Pulse Resp SpO2 Height Weight   04/16/20 1027 105/72 98 °F (36.7 °C) Oral 165 18 96 % -- --   04/16/20 0910 106/73 98.2 °F (36.8 °C) Oral 166 16 94 % -- --   04/16/20 0845 121/72 -- -- 94 -- -- -- --   04/16/20 0545 101/68 -- -- 84 -- -- -- --   04/16/20 0030 89/61 -- -- -- -- -- -- --   04/15/20 2228 117/72 98.6 °F (37 °C) Oral 93 16 92 % -- --   04/15/20 1930 -- -- -- -- -- -- -- 200 lb 9.9 oz (91 kg)   04/15/20 1815 123/77 97.6 °F (36.4 °C) Oral 96 18 -- with an AMPAC score of 17/24. PT/OT in the ARU.     Atrial fibrillation with RVR, possibly paroxysmal  - Continue Metoprolol 50 mg bid, digoxin  -CHADs-VACs score of 4. continue Eliquis  - Cardiology consulted- Amiodarone added      Hypomagnesemia  -monitor and replace     Coronary disease status post stents  -Continue aspirin, statin     Hypertension  -Continue monitor blood pressure     Ovarian cancer status post laparoscopic resection of the pelvic mass, complete hysterectomy and bilateral salpingo-oophorectomy  -Management as per oncology     Impairments: Decreased functional mobility, Decreased ADLs    Bladder - high risk retention - Monitor PVRs, SC prn >300cc    Bowel - high risk constipation - colace BID, PRN miralax and MoM. follow bowel movements. Enema or suppository if needed.      Safety - fall precautions    PPx  DVT: Eliquis   GI: pantoprazole    FULL CODE    Renita Pulliam D.O. M.P.H  PM&R  4/16/2020  11:30 AM

## 2020-04-16 NOTE — PLAN OF CARE
Problem: Nutrition  Goal: Optimal nutrition therapy  Outcome: Ongoing  Note: Nutrition Problem: Inadequate oral intake  Intervention: Food and/or Nutrient Delivery: Modify current diet, Start ONS  Nutritional Goals: pt will improve PO intake to consume greater than 75% of meals and supplements offered during admission to promote post-op healing

## 2020-04-16 NOTE — PROGRESS NOTES
Arrived from ARU per w/c. Alert and oriented. Resp easy/even on room air. NSR per telemetry. Oriented to room and call light system.

## 2020-04-17 LAB
ANION GAP SERPL CALCULATED.3IONS-SCNC: 7 MMOL/L (ref 3–16)
BASOPHILS ABSOLUTE: 0.1 K/UL (ref 0–0.2)
BASOPHILS RELATIVE PERCENT: 0.9 %
BUN BLDV-MCNC: 8 MG/DL (ref 7–20)
CALCIUM SERPL-MCNC: 8.5 MG/DL (ref 8.3–10.6)
CHLORIDE BLD-SCNC: 105 MMOL/L (ref 99–110)
CO2: 24 MMOL/L (ref 21–32)
CREAT SERPL-MCNC: 0.5 MG/DL (ref 0.6–1.1)
EOSINOPHILS ABSOLUTE: 0.4 K/UL (ref 0–0.6)
EOSINOPHILS RELATIVE PERCENT: 4.8 %
GFR AFRICAN AMERICAN: >60
GFR NON-AFRICAN AMERICAN: >60
GLUCOSE BLD-MCNC: 104 MG/DL (ref 70–99)
HCT VFR BLD CALC: 30.5 % (ref 36–48)
HEMOGLOBIN: 10.2 G/DL (ref 12–16)
INR BLD: 1.55 (ref 0.86–1.14)
LV EF: 70 %
LVEF MODALITY: NORMAL
LYMPHOCYTES ABSOLUTE: 1.4 K/UL (ref 1–5.1)
LYMPHOCYTES RELATIVE PERCENT: 15.8 %
MCH RBC QN AUTO: 29.6 PG (ref 26–34)
MCHC RBC AUTO-ENTMCNC: 33.3 G/DL (ref 31–36)
MCV RBC AUTO: 89 FL (ref 80–100)
MONOCYTES ABSOLUTE: 0.7 K/UL (ref 0–1.3)
MONOCYTES RELATIVE PERCENT: 8.1 %
NEUTROPHILS ABSOLUTE: 6.3 K/UL (ref 1.7–7.7)
NEUTROPHILS RELATIVE PERCENT: 70.4 %
PDW BLD-RTO: 13.5 % (ref 12.4–15.4)
PLATELET # BLD: 482 K/UL (ref 135–450)
PMV BLD AUTO: 8.4 FL (ref 5–10.5)
POTASSIUM REFLEX MAGNESIUM: 4.1 MMOL/L (ref 3.5–5.1)
PROTHROMBIN TIME: 18.1 SEC (ref 10–13.2)
RBC # BLD: 3.43 M/UL (ref 4–5.2)
SODIUM BLD-SCNC: 136 MMOL/L (ref 136–145)
WBC # BLD: 8.9 K/UL (ref 4–11)

## 2020-04-17 PROCEDURE — 2580000003 HC RX 258: Performed by: INTERNAL MEDICINE

## 2020-04-17 PROCEDURE — 85025 COMPLETE CBC W/AUTO DIFF WBC: CPT

## 2020-04-17 PROCEDURE — 1200000000 HC SEMI PRIVATE

## 2020-04-17 PROCEDURE — 6370000000 HC RX 637 (ALT 250 FOR IP): Performed by: INTERNAL MEDICINE

## 2020-04-17 PROCEDURE — 78452 HT MUSCLE IMAGE SPECT MULT: CPT

## 2020-04-17 PROCEDURE — 36415 COLL VENOUS BLD VENIPUNCTURE: CPT

## 2020-04-17 PROCEDURE — A9502 TC99M TETROFOSMIN: HCPCS | Performed by: NURSE PRACTITIONER

## 2020-04-17 PROCEDURE — 3430000000 HC RX DIAGNOSTIC RADIOPHARMACEUTICAL: Performed by: NURSE PRACTITIONER

## 2020-04-17 PROCEDURE — 85610 PROTHROMBIN TIME: CPT

## 2020-04-17 PROCEDURE — 6360000002 HC RX W HCPCS: Performed by: NURSE PRACTITIONER

## 2020-04-17 PROCEDURE — 93017 CV STRESS TEST TRACING ONLY: CPT

## 2020-04-17 PROCEDURE — 80048 BASIC METABOLIC PNL TOTAL CA: CPT

## 2020-04-17 RX ORDER — ACETAMINOPHEN 500 MG
1000 TABLET ORAL EVERY 8 HOURS SCHEDULED
Status: DISCONTINUED | OUTPATIENT
Start: 2020-04-17 | End: 2020-04-22 | Stop reason: HOSPADM

## 2020-04-17 RX ORDER — TRAMADOL HYDROCHLORIDE 50 MG/1
50 TABLET ORAL ONCE
Status: COMPLETED | OUTPATIENT
Start: 2020-04-17 | End: 2020-04-17

## 2020-04-17 RX ORDER — TRAMADOL HYDROCHLORIDE 50 MG/1
50 TABLET ORAL EVERY 8 HOURS PRN
Status: DISCONTINUED | OUTPATIENT
Start: 2020-04-17 | End: 2020-04-22 | Stop reason: HOSPADM

## 2020-04-17 RX ADMIN — GABAPENTIN 100 MG: 100 CAPSULE ORAL at 13:39

## 2020-04-17 RX ADMIN — GABAPENTIN 100 MG: 100 CAPSULE ORAL at 08:26

## 2020-04-17 RX ADMIN — TETROFOSMIN 10.2 MILLICURIE: 1.38 INJECTION, POWDER, LYOPHILIZED, FOR SOLUTION INTRAVENOUS at 09:26

## 2020-04-17 RX ADMIN — METOPROLOL SUCCINATE 25 MG: 25 TABLET, EXTENDED RELEASE ORAL at 21:39

## 2020-04-17 RX ADMIN — TRAMADOL HYDROCHLORIDE 50 MG: 50 TABLET, FILM COATED ORAL at 21:38

## 2020-04-17 RX ADMIN — METOPROLOL SUCCINATE 25 MG: 25 TABLET, EXTENDED RELEASE ORAL at 08:26

## 2020-04-17 RX ADMIN — ACETAMINOPHEN 650 MG: 325 TABLET ORAL at 00:50

## 2020-04-17 RX ADMIN — REGADENOSON 0.4 MG: 0.08 INJECTION, SOLUTION INTRAVENOUS at 10:08

## 2020-04-17 RX ADMIN — AMIODARONE HYDROCHLORIDE 200 MG: 200 TABLET ORAL at 08:26

## 2020-04-17 RX ADMIN — ATORVASTATIN CALCIUM 40 MG: 40 TABLET, FILM COATED ORAL at 08:26

## 2020-04-17 RX ADMIN — Medication 10 ML: at 21:38

## 2020-04-17 RX ADMIN — Medication 10 ML: at 10:09

## 2020-04-17 RX ADMIN — AMIODARONE HYDROCHLORIDE 200 MG: 200 TABLET ORAL at 21:38

## 2020-04-17 RX ADMIN — APIXABAN 5 MG: 5 TABLET, FILM COATED ORAL at 21:38

## 2020-04-17 RX ADMIN — ACETAMINOPHEN 1000 MG: 500 TABLET ORAL at 23:20

## 2020-04-17 RX ADMIN — GABAPENTIN 100 MG: 100 CAPSULE ORAL at 21:38

## 2020-04-17 RX ADMIN — Medication 10 ML: at 08:31

## 2020-04-17 RX ADMIN — TETROFOSMIN 29.7 MILLICURIE: 1.38 INJECTION, POWDER, LYOPHILIZED, FOR SOLUTION INTRAVENOUS at 10:08

## 2020-04-17 RX ADMIN — ACETAMINOPHEN 1000 MG: 500 TABLET ORAL at 16:33

## 2020-04-17 RX ADMIN — TRAMADOL HYDROCHLORIDE 50 MG: 50 TABLET, FILM COATED ORAL at 12:23

## 2020-04-17 RX ADMIN — Medication 10 ML: at 09:26

## 2020-04-17 RX ADMIN — APIXABAN 5 MG: 5 TABLET, FILM COATED ORAL at 08:26

## 2020-04-17 ASSESSMENT — PAIN DESCRIPTION - ORIENTATION
ORIENTATION: RIGHT;MID
ORIENTATION: MID;RIGHT
ORIENTATION: RIGHT;MID

## 2020-04-17 ASSESSMENT — PAIN DESCRIPTION - FREQUENCY
FREQUENCY: CONTINUOUS

## 2020-04-17 ASSESSMENT — PAIN DESCRIPTION - PAIN TYPE
TYPE: ACUTE PAIN;SURGICAL PAIN
TYPE: SURGICAL PAIN;ACUTE PAIN
TYPE: ACUTE PAIN;SURGICAL PAIN
TYPE: ACUTE PAIN;SURGICAL PAIN
TYPE: SURGICAL PAIN

## 2020-04-17 ASSESSMENT — PAIN DESCRIPTION - LOCATION
LOCATION: ABDOMEN

## 2020-04-17 ASSESSMENT — PAIN DESCRIPTION - PROGRESSION
CLINICAL_PROGRESSION: NOT CHANGED
CLINICAL_PROGRESSION: GRADUALLY IMPROVING
CLINICAL_PROGRESSION: NOT CHANGED

## 2020-04-17 ASSESSMENT — PAIN DESCRIPTION - DESCRIPTORS
DESCRIPTORS: SHARP;PRESSURE
DESCRIPTORS: DULL;THROBBING
DESCRIPTORS: SHARP
DESCRIPTORS: ACHING;SORE
DESCRIPTORS: SHARP

## 2020-04-17 ASSESSMENT — PAIN SCALES - GENERAL
PAINLEVEL_OUTOF10: 10
PAINLEVEL_OUTOF10: 5
PAINLEVEL_OUTOF10: 10
PAINLEVEL_OUTOF10: 8
PAINLEVEL_OUTOF10: 10

## 2020-04-17 ASSESSMENT — PAIN DESCRIPTION - ONSET
ONSET: ON-GOING

## 2020-04-17 NOTE — DISCHARGE SUMMARY
Discharge Summary     Date:4/17/2020        Patient Name:Trina Ashford     YOB: 1960     Age:59 y.o. Admit Date:4/8/2020   Admission Condition:good   Discharged Condition:good  Discharge Date: 4/15/2020     Discharge Diagnoses   Active Problems:    Ovarian cancer on left [facility])  Resolved Problems:    * No resolved hospital problems. [facility] AND [facility] Stay   Narrative of Hospital Course:   Wendy Bailey is a 62 yo female taken to the OR on 4/8/2020 for likely ovarian carcinoma. She had a preop w/u with an elevated , findings of peritoneal carcinomatosis and bilateral ovarian masses. Procedure performed was a diagnostic laparoscopy converted to a laparotomy via midline vertical incision, total abdominal hysterectomy, BSO, tumor debulking, complete omentectomy and an appendectomy. Procedure was uncomplicated. EBL was ~ 300cc. Postop course was complicated by left lower extremity weakness for which PT/OT was consulted. She was doing well with PT, however, on POD#6, she had further LLE weakness and her left leg buckled under her as she was being assisted to the bathroom by her RN. She was assisted to the floor. Did not hit her head. No trauma experienced. However, given that episode, she was re-evaluated for DC to an acute rehab facility. Additionally, on POD#3, she had an episode of A-fib w/ RVR. Had a second episode on POD#5. ECHO was done and demonstrated an EF of 50-55%. She was started on Eliquis 5mg po BID and was given po digoxin. Once her HR was normalized and back into NSR, and then meeting DC criteria, she was discharged to inpatient rehab on 4/15/20. Consultants:   IP CONSULT TO HOSPITALIST  IP CONSULT TO CARDIOLOGY  IP CONSULT TO SOCIAL WORK  IP CONSULT TO PHYSICAL MEDICINE REHAB    Time Spent on Discharge:  > 35 minutes were spent in patient examination, evaluation, counseling as well as medication reconciliation, prescriptions for required medications, discharge plan and follow up.

## 2020-04-17 NOTE — CARE COORDINATION
Pt transferred to [facility] from [address] SW received a call from [facility]/liaison in ARU stating that Pt just came to ARU on 4/15/20 and once she is medically stable, she will be able to return to ARU. MARTHA/GENOVEVA will follow.      Carmencita Ambrose, [facility]  Case Management  [phone]

## 2020-04-17 NOTE — PROGRESS NOTES
Hospital Medicine Progress Note    Chief Complaint:  Palpitations      Subjective/Interval Hx:    Doing well  Overnight felt palpitations but no complains since then    Allergies:  Patient has no known allergies. REVIEW OF SYSTEMS:   Pertinent positives as noted in the HPI. All other systems reviewed and negative. PHYSICAL EXAM PERFORMED:    /62   Pulse 79   Temp 97 °F (36.1 °C) (Axillary)   Resp 18   Ht 5' 9\" (1.753 m)   Wt 200 lb (90.7 kg)   SpO2 98%   BMI 29.53 kg/m²     General appearance:  No apparent distress, appears stated age and cooperative. HEENT:  Normal cephalic, atraumatic without obvious deformity. Pupils equal, round, and reactive to light. Extra ocular muscles intact. Conjunctivae/corneas clear. Neck: Supple, with full range of motion. No jugular venous distention. Trachea midline. Respiratory:  Normal respiratory effort. Clear to auscultation, bilaterally without Rales/Wheezes/Rhonchi. Cardiovascular:  Irregularly irregular with normal S1/S2 without murmurs, rubs or gallops. Abdomen: Soft, non-tender, non-distended with normal bowel sounds. Musculoskeletal:  No clubbing, cyanosis, Trace edema bilaterally. Full range of motion without deformity. Skin: Skin color, texture, turgor normal.  No rashes or lesions. Neurologic:  Neurovascularly intact without any focal sensory/motor deficits.  Cranial nerves: II-XII intact, grossly non-focal.  Psychiatric:  Alert and oriented, thought content appropriate, normal insight  Capillary Refill: Brisk,< 3 seconds   Peripheral Pulses: +2 palpable, equal bilaterally       Labs:     Recent Labs     04/16/20  0629 04/17/20  0525   WBC 10.0 8.9   HGB 10.7* 10.2*   HCT 32.1* 30.5*   * 482*     Recent Labs     04/15/20  0535 04/16/20  0629 04/17/20  0525    135* 136   K 4.6 4.1 4.1    102 105   CO2 23 24 24   BUN 7 7 8   CREATININE 0.6 0.6 0.5*   CALCIUM 7.9* 8.3 8.5   PHOS 3.1  --   --      Recent Labs 04/15/20  0535   AST 21   ALT 13   BILIDIR <0.2   BILITOT 0.3   ALKPHOS 90     Recent Labs     04/17/20  0525   INR 1.55*     Recent Labs     04/16/20  0631   TROPONINI <0.01       Urinalysis:      Lab Results   Component Value Date    NITRU Negative 02/21/2020    WBCUA 11-20 02/21/2020    BACTERIA Rare 02/21/2020    RBCUA 0-2 02/21/2020    BLOODU TRACE-INTACT 02/21/2020    SPECGRAV <=1.005 02/21/2020    GLUCOSEU Negative 02/21/2020       Radiology:     NM Cardiac Stress Test Nuclear Imaging    (Results Pending)       ASSESSMENT/PLAN:    Active Hospital Problems    Diagnosis Date Noted    Atrial fibrillation with RVR ([address] [I48.91] 04/16/2020     Atrial fibrillation with RVR  -Transferred from acute rehab with NEIL jackson RVR  Continue metoprolol 25 mg XL bid and amiodarone 200 mg twice daily  Electrophysiology on board  NM stress ordered, results pending  -CHADs-VACs score of 4. continue Eliquis  -Cardiology following  -Continue telemetry, optimize electrolytes  -Potassium greater than 4, magnesium greater than 2     Coronary disease status post stents 2008  -Continue aspirin, statin     Hypertension:  -Continue PTA BP medications     Ovarian cancer status post laparoscopic resection of the pelvic mass, complete hysterectomy and bilateral salpingo-oophorectomy on 04/08/2020    Left leg weakness/Numbness  -Likely secondary to extensive radical peritonealectomy and use of deep pelvic retractors      DVT Prophylaxis: on eliquis  Diet: DIET GENERAL;  Code Status: Full Code    PT/OT Eval Status: ordered    [facility] inpatient care     Alfredo Castro MD  Hospitalist    Thank you Lucas Dickerson MD for the opportunity to be involved in this patient's care. If you have any questions or concerns please feel free to contact me at [phone].

## 2020-04-17 NOTE — DISCHARGE SUMMARY
Physical Medicine & Rehabilitation  Discharge Summary     Patient Identification:  Luis Rollins  : 1960  Admit date: 2020  Discharge date:  20  Attending provider: Jeet Lara MD        Primary care provider: Rosmery Amador MD     Discharge Diagnoses:   Patient Active Problem List   Diagnosis    Coronary artery disease    Hyperlipidemia    HTN (hypertension)    Alcohol abuse    Tobacco abuse    Ovarian cancer on left ([address]    Debility    At[facility] fibrillation with RVR [address] [facility]       History of Present Illness/Acute Hospital Course: This is a 65yo F with a past medical history including ovarian cancer, CAD, HLD, HTN, ETOH abuse, tobacco abuse and tachycardia who came into the hospital for surgical management of ovarian cancer. She underwent a laparoscopic resection of the pelvic mass, complete hysterectomy, and bilateral salpingo-oophorectomy. She has been recovering in med/surg but developed tachycardia post op. Cardiology and internal medicine were consulted and managed with metoprolol. She continued to be very weak after her surgery and struggled with therapies. She was scoring 17/24 with physical therapy today due to poor L quad strength and knee buckling. She is motivated and would like to come to the ARU post acute admit to continue to work in therapy to be able to discharge home independently.      Overnight after her late admit to the ARU she started to have tachycardia and Afib with RVR- cardiology was consulted and started amiodarone. She will continue to be monitored and start with limited therapy. Inpatient Rehabilitation Course:   Luis Rollins is a 61 y.o. female admitted to inpatient rehabilitation on 2020 with debility. The patient was unable to participate in much therapy due to having repeated events of Afib.  Cardiology was consulted and after multiple failed attempts to get afib under control with oral medications she was forced to acute off the floor to telemetry. Impairments: Decreased functional mobility    Medical Management:  Afib, HTN, weakness    Discharge Exam:  Constitutional: Alert, WDWN, Pleasant, no distress  Head: Normocephalic, atruamatic, MMM  Eyes: Conjunctiva noninjected, no icterus, no drainage  Pulm: CTA bilat. Respirations non-labored. CV: No murmurs noted. Irregular. Abd: Soft, nontender.  NABS+  Ext: No edema, no varicosities  Neuro: Alert, fully oriented, appropriate   MSK: No joint abnormalities noted       Discharge Functional Status:    Physical therapy:  Bed Mobility:    Transfers:  ,  ,    Mobility:  ,  ,      Occupational therapy:  ,  ,      Speech therapy:         Significant Diagnostics:   Lab Results   Component Value Date    CREATININE 0.5 (L) 04/17/2020    BUN 8 04/17/2020     04/17/2020    K 4.1 04/17/2020     04/17/2020    CO2 24 04/17/2020       Lab Results   Component Value Date    WBC 8.9 04/17/2020    HGB 10.2 (L) 04/17/2020    HCT 30.5 (L) 04/17/2020    MCV 89.0 04/17/2020     (H) 04/17/2020       Disposition:  Telemetry         Discharge Condition: Stable    Follow-up:  See after visit summary from hospitalization    Discharge Medications:     Medication List      STOP taking these medications    metoprolol tartrate 25 MG tablet  Commonly known as:  LOPRESSOR        ASK your doctor about these medications    apixaban 5 MG Tabs tablet  Commonly known as:  ELIQUIS  Take 1 tablet by mouth 2 times daily     atorvastatin 40 MG tablet  Commonly known as:  LIPITOR  TAKE ONE TABLET BY MOUTH ONCE DAILY     digoxin 250 MCG tablet  Commonly known as:  LANOXIN  Take 1 tablet by mouth daily     docusate sodium 100 MG capsule  Commonly known as:  Colace  Take 1 capsule by mouth 2 times daily     gabapentin 100 MG capsule  Commonly known as:  NEURONTIN     ibuprofen 600 MG tablet  Commonly known as:  ADVIL;MOTRIN  Take 1 tablet by mouth every 6 hours as needed for Pain     metoprolol succinate 25 MG extended

## 2020-04-18 LAB
EKG ATRIAL RATE: 144 BPM
EKG ATRIAL RATE: 74 BPM
EKG DIAGNOSIS: NORMAL
EKG DIAGNOSIS: NORMAL
EKG P AXIS: 73 DEGREES
EKG P-R INTERVAL: 136 MS
EKG Q-T INTERVAL: 312 MS
EKG Q-T INTERVAL: 374 MS
EKG QRS DURATION: 80 MS
EKG QRS DURATION: 84 MS
EKG QTC CALCULATION (BAZETT): 415 MS
EKG QTC CALCULATION (BAZETT): 492 MS
EKG R AXIS: 63 DEGREES
EKG R AXIS: 70 DEGREES
EKG T AXIS: -81 DEGREES
EKG T AXIS: 58 DEGREES
EKG VENTRICULAR RATE: 150 BPM
EKG VENTRICULAR RATE: 74 BPM

## 2020-04-18 PROCEDURE — 93010 ELECTROCARDIOGRAM REPORT: CPT | Performed by: INTERNAL MEDICINE

## 2020-04-18 PROCEDURE — U0002 COVID-19 LAB TEST NON-CDC: HCPCS

## 2020-04-18 PROCEDURE — 6370000000 HC RX 637 (ALT 250 FOR IP): Performed by: INTERNAL MEDICINE

## 2020-04-18 PROCEDURE — 1200000000 HC SEMI PRIVATE

## 2020-04-18 PROCEDURE — 99233 SBSQ HOSP IP/OBS HIGH 50: CPT | Performed by: INTERNAL MEDICINE

## 2020-04-18 PROCEDURE — 2580000003 HC RX 258: Performed by: INTERNAL MEDICINE

## 2020-04-18 RX ORDER — SOTALOL HYDROCHLORIDE 80 MG/1
120 TABLET ORAL 2 TIMES DAILY
Status: DISCONTINUED | OUTPATIENT
Start: 2020-04-18 | End: 2020-04-22 | Stop reason: HOSPADM

## 2020-04-18 RX ADMIN — Medication 10 ML: at 20:22

## 2020-04-18 RX ADMIN — AMIODARONE HYDROCHLORIDE 200 MG: 200 TABLET ORAL at 08:59

## 2020-04-18 RX ADMIN — GABAPENTIN 100 MG: 100 CAPSULE ORAL at 20:22

## 2020-04-18 RX ADMIN — GABAPENTIN 100 MG: 100 CAPSULE ORAL at 08:59

## 2020-04-18 RX ADMIN — ATORVASTATIN CALCIUM 40 MG: 40 TABLET, FILM COATED ORAL at 08:59

## 2020-04-18 RX ADMIN — TRAMADOL HYDROCHLORIDE 50 MG: 50 TABLET, FILM COATED ORAL at 05:40

## 2020-04-18 RX ADMIN — SOTALOL HYDROCHLORIDE 120 MG: 80 TABLET ORAL at 20:22

## 2020-04-18 RX ADMIN — ACETAMINOPHEN 1000 MG: 500 TABLET ORAL at 09:07

## 2020-04-18 RX ADMIN — APIXABAN 5 MG: 5 TABLET, FILM COATED ORAL at 20:22

## 2020-04-18 RX ADMIN — ACETAMINOPHEN 1000 MG: 500 TABLET ORAL at 16:57

## 2020-04-18 RX ADMIN — TRAMADOL HYDROCHLORIDE 50 MG: 50 TABLET, FILM COATED ORAL at 20:23

## 2020-04-18 RX ADMIN — APIXABAN 5 MG: 5 TABLET, FILM COATED ORAL at 08:59

## 2020-04-18 RX ADMIN — METOPROLOL SUCCINATE 25 MG: 25 TABLET, EXTENDED RELEASE ORAL at 08:59

## 2020-04-18 RX ADMIN — GABAPENTIN 100 MG: 100 CAPSULE ORAL at 16:57

## 2020-04-18 RX ADMIN — PANTOPRAZOLE SODIUM 40 MG: 40 TABLET, DELAYED RELEASE ORAL at 05:39

## 2020-04-18 RX ADMIN — Medication 10 ML: at 08:59

## 2020-04-18 ASSESSMENT — PAIN DESCRIPTION - PAIN TYPE
TYPE: SURGICAL PAIN
TYPE: ACUTE PAIN

## 2020-04-18 ASSESSMENT — PAIN DESCRIPTION - ONSET
ONSET: ON-GOING

## 2020-04-18 ASSESSMENT — PAIN DESCRIPTION - ORIENTATION
ORIENTATION: RIGHT;MID
ORIENTATION: RIGHT;LOWER;MID
ORIENTATION: RIGHT;MID
ORIENTATION: RIGHT;LOWER

## 2020-04-18 ASSESSMENT — PAIN DESCRIPTION - FREQUENCY
FREQUENCY: CONTINUOUS

## 2020-04-18 ASSESSMENT — PAIN - FUNCTIONAL ASSESSMENT
PAIN_FUNCTIONAL_ASSESSMENT: ACTIVITIES ARE NOT PREVENTED

## 2020-04-18 ASSESSMENT — PAIN SCALES - GENERAL
PAINLEVEL_OUTOF10: 4
PAINLEVEL_OUTOF10: 5
PAINLEVEL_OUTOF10: 6
PAINLEVEL_OUTOF10: 7
PAINLEVEL_OUTOF10: 8

## 2020-04-18 ASSESSMENT — PAIN DESCRIPTION - DESCRIPTORS
DESCRIPTORS: ACHING;SHARP
DESCRIPTORS: THROBBING;DISCOMFORT
DESCRIPTORS: DULL;ACHING
DESCRIPTORS: ACHING

## 2020-04-18 ASSESSMENT — PAIN DESCRIPTION - PROGRESSION
CLINICAL_PROGRESSION: NOT CHANGED
CLINICAL_PROGRESSION: GRADUALLY IMPROVING
CLINICAL_PROGRESSION: NOT CHANGED
CLINICAL_PROGRESSION: NOT CHANGED
CLINICAL_PROGRESSION: GRADUALLY IMPROVING
CLINICAL_PROGRESSION: NOT CHANGED

## 2020-04-18 ASSESSMENT — PAIN DESCRIPTION - LOCATION
LOCATION: ABDOMEN

## 2020-04-18 NOTE — PROGRESS NOTES
response and atrial flutter with rapid ventricular response  · Constitutional: Alert. Oriented to person, place, and time. No distress. · Head: Normocephalic and atraumatic. · Mouth/Throat: Lips appear moist. Oropharynx is clear and moist.  · Eyes: Conjunctivae normal. EOM are normal.   · Neck: Neck supple. No lymphadenopathy. No rigidity. No JVD present. · Cardiovascular: Normal rate, regular rhythm. Normal S1&S2. Carotid pulse 2+ bilaterally. · Pulmonary/Chest: Bilateral respiratory sounds present. No respiratory accessory muscle use. No wheezes, No rhonchi. · Abdominal: Soft. Normal bowel sounds present. No distension, No tenderness. No splenomegaly. No hernia. · Musculoskeletal: No tenderness. No edema    · Lymphadenopathy: Has no cervical adenopathy. · Neurological: Alert and oriented. Cranial nerve II-XII grossly intact, No gross deficit to touch. · Skin: Skin is warm and dry. No rash, lesions, ulcerations noted. · Psychiatric: No anxiety nor agitation. Labs, diagnostic and imaging results reviewed. Reviewed. Recent Labs     04/16/20  0629 04/17/20  0525   * 136   K 4.1 4.1    105   CO2 24 24   BUN 7 8   CREATININE 0.6 0.5*     Recent Labs     04/16/20  0629 04/17/20  0525   WBC 10.0 8.9   HGB 10.7* 10.2*   HCT 32.1* 30.5*   MCV 88.3 89.0   * 482*     Lab Results   Component Value Date    TROPONINI <0.01 04/16/2020     Estimated Creatinine Clearance: 145 mL/min (A) (based on SCr of 0.5 mg/dL (L)).    No results found for: BNP  Lab Results   Component Value Date    PROTIME 18.1 04/17/2020    INR 1.55 04/17/2020     Lab Results   Component Value Date    CHOL 175 05/22/2014    HDL 46 05/22/2014    HDL 54 03/30/2011    TRIG 174 05/22/2014       Scheduled Meds:   sotalol  120 mg Oral BID    acetaminophen  1,000 mg Oral 3 times per day    sodium chloride flush  10 mL Intravenous 2 times per day    apixaban  5 mg Oral BID    atorvastatin  40 mg Oral Daily    gabapentin  100 mg Oral TID    pantoprazole  40 mg Oral QAM AC     Continuous Infusions:  PRN Meds:traMADol, sodium chloride flush, polyethylene glycol, promethazine **OR** [DISCONTINUED] ondansetron, nitroGLYCERIN     Patient Active Problem List    Diagnosis Date Noted    [facility] 04/16/2020    Debility 04/15/2020    Ovarian cancer on left ([facility] [address] 04/08/2020    Coronary artery disease 10/09/2012    Hyperlipidemia 10/09/2012    HTN (hypertension) 10/09/2012    Alcohol abuse 10/09/2012    Tobacco abuse 10/09/2012      Active Hospital Problems    Diagnosis Date Noted    Atrial fibrillation with RVR ([facility] [I48.91] 04/16/2020       Assessment and Plan:     1. Highly symptomatic paroxysmal atrial fibrillation  2. Typical atrial flutter  3. CAD, status post PCI  4. Hypertension  5. Hyperlipidemia  6. Ovarian cancer, status post surgery    Patient continues to have highly symptomatic episodes of atrial fibrillation with rapid ventricular response  In addition to this, she also have typical atrial flutter with rapid ventricular response  Her nuclear stress test was negative for any reversible ischemia  Currently, she is on Eliquis 5 mg p.o. twice daily, which was initiated recently. Secondary to recurrent and symptomatic episodes, I have recommended aggressive rhythm control strategy  We will schedule atrial flutter ablation on Monday  She will need a TARIQ before proceeding with atrial flutter ablation as she was not on anticoagulation for more than 3 weeks    She will need antiarrhythmic therapy for her atrial fibrillation  We will start her on sotalol 120 mg p.o. every 12 hours  Down the road, she will need atrial fibrillation ablation for management of atrial fibrillation. Discussed in detail about the indications, risk and benefits of the procedure. Patient is willing to proceed.   N.p.o. after midnight on Sunday    Thank you for allowing me to participate in the care of this

## 2020-04-19 LAB
ALBUMIN SERPL-MCNC: 2.8 G/DL (ref 3.4–5)
ANION GAP SERPL CALCULATED.3IONS-SCNC: 8 MMOL/L (ref 3–16)
BUN BLDV-MCNC: 8 MG/DL (ref 7–20)
CALCIUM SERPL-MCNC: 8.7 MG/DL (ref 8.3–10.6)
CHLORIDE BLD-SCNC: 104 MMOL/L (ref 99–110)
CO2: 26 MMOL/L (ref 21–32)
CREAT SERPL-MCNC: 0.5 MG/DL (ref 0.6–1.1)
GFR AFRICAN AMERICAN: >60
GFR NON-AFRICAN AMERICAN: >60
GLUCOSE BLD-MCNC: 117 MG/DL (ref 70–99)
PHOSPHORUS: 3.9 MG/DL (ref 2.5–4.9)
POTASSIUM SERPL-SCNC: 4.4 MMOL/L (ref 3.5–5.1)
SODIUM BLD-SCNC: 138 MMOL/L (ref 136–145)

## 2020-04-19 PROCEDURE — 36415 COLL VENOUS BLD VENIPUNCTURE: CPT

## 2020-04-19 PROCEDURE — 2580000003 HC RX 258: Performed by: INTERNAL MEDICINE

## 2020-04-19 PROCEDURE — 6370000000 HC RX 637 (ALT 250 FOR IP): Performed by: INTERNAL MEDICINE

## 2020-04-19 PROCEDURE — 1200000000 HC SEMI PRIVATE

## 2020-04-19 PROCEDURE — 99233 SBSQ HOSP IP/OBS HIGH 50: CPT | Performed by: INTERNAL MEDICINE

## 2020-04-19 PROCEDURE — 80069 RENAL FUNCTION PANEL: CPT

## 2020-04-19 RX ADMIN — GABAPENTIN 100 MG: 100 CAPSULE ORAL at 08:53

## 2020-04-19 RX ADMIN — ACETAMINOPHEN 1000 MG: 500 TABLET ORAL at 00:25

## 2020-04-19 RX ADMIN — GABAPENTIN 100 MG: 100 CAPSULE ORAL at 21:45

## 2020-04-19 RX ADMIN — ACETAMINOPHEN 1000 MG: 500 TABLET ORAL at 21:46

## 2020-04-19 RX ADMIN — ACETAMINOPHEN 1000 MG: 500 TABLET ORAL at 06:51

## 2020-04-19 RX ADMIN — APIXABAN 5 MG: 5 TABLET, FILM COATED ORAL at 08:53

## 2020-04-19 RX ADMIN — PANTOPRAZOLE SODIUM 40 MG: 40 TABLET, DELAYED RELEASE ORAL at 06:50

## 2020-04-19 RX ADMIN — Medication 10 ML: at 08:53

## 2020-04-19 RX ADMIN — SOTALOL HYDROCHLORIDE 120 MG: 80 TABLET ORAL at 08:53

## 2020-04-19 RX ADMIN — ACETAMINOPHEN 1000 MG: 500 TABLET ORAL at 15:04

## 2020-04-19 RX ADMIN — ATORVASTATIN CALCIUM 40 MG: 40 TABLET, FILM COATED ORAL at 08:53

## 2020-04-19 RX ADMIN — Medication 10 ML: at 21:45

## 2020-04-19 RX ADMIN — GABAPENTIN 100 MG: 100 CAPSULE ORAL at 15:04

## 2020-04-19 RX ADMIN — APIXABAN 5 MG: 5 TABLET, FILM COATED ORAL at 21:45

## 2020-04-19 RX ADMIN — SOTALOL HYDROCHLORIDE 120 MG: 80 TABLET ORAL at 21:45

## 2020-04-19 RX ADMIN — TRAMADOL HYDROCHLORIDE 50 MG: 50 TABLET, FILM COATED ORAL at 23:30

## 2020-04-19 ASSESSMENT — PAIN DESCRIPTION - FREQUENCY
FREQUENCY: CONTINUOUS

## 2020-04-19 ASSESSMENT — PAIN SCALES - GENERAL
PAINLEVEL_OUTOF10: 5
PAINLEVEL_OUTOF10: 7
PAINLEVEL_OUTOF10: 6
PAINLEVEL_OUTOF10: 4

## 2020-04-19 ASSESSMENT — PAIN DESCRIPTION - PROGRESSION
CLINICAL_PROGRESSION: GRADUALLY IMPROVING
CLINICAL_PROGRESSION: GRADUALLY WORSENING
CLINICAL_PROGRESSION: GRADUALLY IMPROVING
CLINICAL_PROGRESSION: GRADUALLY WORSENING

## 2020-04-19 ASSESSMENT — PAIN DESCRIPTION - ONSET
ONSET: ON-GOING

## 2020-04-19 ASSESSMENT — PAIN DESCRIPTION - PAIN TYPE
TYPE: ACUTE PAIN

## 2020-04-19 ASSESSMENT — PAIN DESCRIPTION - DESCRIPTORS
DESCRIPTORS: HEADACHE
DESCRIPTORS: ACHING
DESCRIPTORS: ACHING
DESCRIPTORS: DULL

## 2020-04-19 ASSESSMENT — PAIN DESCRIPTION - LOCATION
LOCATION: ABDOMEN
LOCATION: HEAD

## 2020-04-19 ASSESSMENT — PAIN DESCRIPTION - ORIENTATION: ORIENTATION: RIGHT

## 2020-04-19 ASSESSMENT — PAIN - FUNCTIONAL ASSESSMENT: PAIN_FUNCTIONAL_ASSESSMENT: ACTIVITIES ARE NOT PREVENTED

## 2020-04-19 NOTE — PLAN OF CARE
Problem: Pain:  Goal: Pain level will decrease  Description: Pain level will decrease  Outcome: Ongoing  Note: Patient complaint of pain relieved with scheduled pain medications, patient educated on alternative methods of pain relief, patient verbalized understanding.      Problem: Falls - Risk of:  Goal: Will remain free from falls  Description: Will remain free from falls  Outcome: Ongoing  Note: Patient is a medium fall risk, patient ambulates independently throughout room with steady gait, patient educated to call for assistance as needed, patient verbalized understanding

## 2020-04-20 ENCOUNTER — ANESTHESIA EVENT (OUTPATIENT)
Dept: CARDIAC CATH/INVASIVE PROCEDURES | Age: 60
DRG: 274 | End: 2020-04-20
Payer: COMMERCIAL

## 2020-04-20 ENCOUNTER — APPOINTMENT (OUTPATIENT)
Dept: CARDIAC CATH/INVASIVE PROCEDURES | Age: 60
DRG: 274 | End: 2020-04-20
Attending: INTERNAL MEDICINE
Payer: COMMERCIAL

## 2020-04-20 ENCOUNTER — ANESTHESIA (OUTPATIENT)
Dept: CARDIAC CATH/INVASIVE PROCEDURES | Age: 60
DRG: 274 | End: 2020-04-20
Payer: COMMERCIAL

## 2020-04-20 LAB
ALBUMIN SERPL-MCNC: 2.6 G/DL (ref 3.4–5)
ANION GAP SERPL CALCULATED.3IONS-SCNC: 10 MMOL/L (ref 3–16)
BASOPHILS ABSOLUTE: 0 K/UL (ref 0–0.2)
BASOPHILS RELATIVE PERCENT: 0.3 %
BUN BLDV-MCNC: 6 MG/DL (ref 7–20)
CALCIUM SERPL-MCNC: 8.6 MG/DL (ref 8.3–10.6)
CHLORIDE BLD-SCNC: 106 MMOL/L (ref 99–110)
CO2: 25 MMOL/L (ref 21–32)
CREAT SERPL-MCNC: 0.6 MG/DL (ref 0.6–1.1)
EOSINOPHILS ABSOLUTE: 0.9 K/UL (ref 0–0.6)
EOSINOPHILS RELATIVE PERCENT: 8.9 %
GFR AFRICAN AMERICAN: >60
GFR NON-AFRICAN AMERICAN: >60
GLUCOSE BLD-MCNC: 96 MG/DL (ref 70–99)
HCT VFR BLD CALC: 31.3 % (ref 36–48)
HEMOGLOBIN: 10.3 G/DL (ref 12–16)
INR BLD: 1.35 (ref 0.86–1.14)
LYMPHOCYTES ABSOLUTE: 1.5 K/UL (ref 1–5.1)
LYMPHOCYTES RELATIVE PERCENT: 15.7 %
MCH RBC QN AUTO: 29.3 PG (ref 26–34)
MCHC RBC AUTO-ENTMCNC: 33 G/DL (ref 31–36)
MCV RBC AUTO: 88.7 FL (ref 80–100)
MONOCYTES ABSOLUTE: 0.7 K/UL (ref 0–1.3)
MONOCYTES RELATIVE PERCENT: 7.4 %
NEUTROPHILS ABSOLUTE: 6.6 K/UL (ref 1.7–7.7)
NEUTROPHILS RELATIVE PERCENT: 67.7 %
PDW BLD-RTO: 13.6 % (ref 12.4–15.4)
PHOSPHORUS: 3.9 MG/DL (ref 2.5–4.9)
PLATELET # BLD: 508 K/UL (ref 135–450)
PMV BLD AUTO: 8.3 FL (ref 5–10.5)
POTASSIUM SERPL-SCNC: 4.4 MMOL/L (ref 3.5–5.1)
PROTHROMBIN TIME: 15.7 SEC (ref 10–13.2)
RBC # BLD: 3.53 M/UL (ref 4–5.2)
SARS-COV-2, PCR: NOT DETECTED
SODIUM BLD-SCNC: 141 MMOL/L (ref 136–145)
WBC # BLD: 9.8 K/UL (ref 4–11)

## 2020-04-20 PROCEDURE — 2580000003 HC RX 258: Performed by: INTERNAL MEDICINE

## 2020-04-20 PROCEDURE — 85610 PROTHROMBIN TIME: CPT

## 2020-04-20 PROCEDURE — 85025 COMPLETE CBC W/AUTO DIFF WBC: CPT

## 2020-04-20 PROCEDURE — 36415 COLL VENOUS BLD VENIPUNCTURE: CPT

## 2020-04-20 PROCEDURE — 99232 SBSQ HOSP IP/OBS MODERATE 35: CPT | Performed by: INTERNAL MEDICINE

## 2020-04-20 PROCEDURE — 80069 RENAL FUNCTION PANEL: CPT

## 2020-04-20 PROCEDURE — 1200000000 HC SEMI PRIVATE

## 2020-04-20 PROCEDURE — 6370000000 HC RX 637 (ALT 250 FOR IP): Performed by: INTERNAL MEDICINE

## 2020-04-20 RX ADMIN — ACETAMINOPHEN 1000 MG: 500 TABLET ORAL at 21:57

## 2020-04-20 RX ADMIN — GABAPENTIN 100 MG: 100 CAPSULE ORAL at 20:05

## 2020-04-20 RX ADMIN — ACETAMINOPHEN 1000 MG: 500 TABLET ORAL at 06:53

## 2020-04-20 RX ADMIN — SOTALOL HYDROCHLORIDE 120 MG: 80 TABLET ORAL at 20:04

## 2020-04-20 RX ADMIN — GABAPENTIN 100 MG: 100 CAPSULE ORAL at 13:56

## 2020-04-20 RX ADMIN — ATORVASTATIN CALCIUM 40 MG: 40 TABLET, FILM COATED ORAL at 08:04

## 2020-04-20 RX ADMIN — SOTALOL HYDROCHLORIDE 120 MG: 80 TABLET ORAL at 08:04

## 2020-04-20 RX ADMIN — Medication 10 ML: at 08:04

## 2020-04-20 RX ADMIN — ACETAMINOPHEN 1000 MG: 500 TABLET ORAL at 13:56

## 2020-04-20 RX ADMIN — GABAPENTIN 100 MG: 100 CAPSULE ORAL at 08:04

## 2020-04-20 RX ADMIN — Medication 10 ML: at 20:06

## 2020-04-20 RX ADMIN — APIXABAN 5 MG: 5 TABLET, FILM COATED ORAL at 20:05

## 2020-04-20 RX ADMIN — PANTOPRAZOLE SODIUM 40 MG: 40 TABLET, DELAYED RELEASE ORAL at 06:53

## 2020-04-20 RX ADMIN — APIXABAN 5 MG: 5 TABLET, FILM COATED ORAL at 08:03

## 2020-04-20 ASSESSMENT — PAIN DESCRIPTION - ONSET
ONSET: ON-GOING
ONSET: ON-GOING

## 2020-04-20 ASSESSMENT — PAIN - FUNCTIONAL ASSESSMENT: PAIN_FUNCTIONAL_ASSESSMENT: ACTIVITIES ARE NOT PREVENTED

## 2020-04-20 ASSESSMENT — PAIN DESCRIPTION - PAIN TYPE
TYPE: ACUTE PAIN
TYPE: ACUTE PAIN

## 2020-04-20 ASSESSMENT — PAIN DESCRIPTION - DESCRIPTORS
DESCRIPTORS: ACHING
DESCRIPTORS: ACHING

## 2020-04-20 ASSESSMENT — PAIN SCALES - GENERAL
PAINLEVEL_OUTOF10: 7
PAINLEVEL_OUTOF10: 5
PAINLEVEL_OUTOF10: 5

## 2020-04-20 ASSESSMENT — PAIN DESCRIPTION - LOCATION
LOCATION: ABDOMEN
LOCATION: ABDOMEN

## 2020-04-20 ASSESSMENT — PAIN DESCRIPTION - FREQUENCY
FREQUENCY: CONTINUOUS
FREQUENCY: CONTINUOUS

## 2020-04-20 ASSESSMENT — PAIN DESCRIPTION - ORIENTATION
ORIENTATION: RIGHT
ORIENTATION: RIGHT

## 2020-04-20 ASSESSMENT — PAIN DESCRIPTION - PROGRESSION
CLINICAL_PROGRESSION: GRADUALLY WORSENING
CLINICAL_PROGRESSION: GRADUALLY WORSENING

## 2020-04-20 NOTE — PLAN OF CARE
Problem: Pain:  Goal: Pain level will decrease  Description: Pain level will decrease  Outcome: Ongoing  Note: Pt c/o 5/10 abdominal pain. Medicated with scheduled tylenol as ordered. Will continue to monitor. Problem: Falls - Risk of:  Goal: Will remain free from falls  Description: Will remain free from falls  Outcome: Ongoing  Note: Patient at risk for falls. Patient resting quietly in bed. Side rails up x 2/4. Bed locked in lowest position. Bed alarm on, chair alarm on while up to chair. Bedside table and call light within reach. Patient instructed to call for assistance. Patient verbalized understanding. Will continue to monitor.

## 2020-04-20 NOTE — PROGRESS NOTES
[address]   Cardiac Electrophysiology Progress Note     Admit Date: 2020     Reason for follow up: Atrial fibrillation/flutter    HPI and Interval History:   Patient seen and examined. Clinical notes reviewed. Telemetry reviewed. No new complaint today. Patient remains in sinus rhythm  No major events overnight. Denies having chest pain, shortness of breath, dyspnea on exertion, Orthopnea, PND at the time of this visit. Review of System:  All other systems reviewed except for that noted above. Pertinent negatives and positives are:     · General: negative for fever, chills   · Ophthalmic ROS: negative for - eye pain or loss of vision  · ENT ROS: negative for - headaches, sore throat   · Respiratory: negative for - cough, sputum  · Cardiovascular: Reviewed in HPI  · Gastrointestinal: negative for - abdominal pain, diarrhea, N/V  · Hematology: negative for - bleeding, blood clots, bruising or jaundice  · Genito-Urinary:  negative for - Dysuria or incontinence  · Musculoskeletal: negative for - Joint swelling, muscle pain  · Neurological: negative for - confusion, dizziness, headaches   · Psychiatric: No anxiety, no depression. · Dermatological: negative for - rash      Physical Examination:  Vitals:    20 1508   BP: 116/65   Pulse: 61   Resp: 16   Temp: 97.5 °F (36.4 °C)   SpO2: 97%        Intake/Output Summary (Last 24 hours) at 2020 1547  Last data filed at 2020 2147  Gross per 24 hour   Intake 360 ml   Output --   Net 360 ml     No intake/output data recorded. Wt Readings from Last 3 Encounters:   20 200 lb (90.7 kg)   04/15/20 200 lb 9.9 oz (91 kg)   20 190 lb 3.2 oz (86.3 kg)     Temp  Av °F (36.7 °C)  Min: 97.5 °F (36.4 °C)  Max: 98.3 °F (36.8 °C)  Pulse  Av.7  Min: 55  Max: 66  BP  Min: 108/59  Max: 132/71  SpO2  Av.8 %  Min: 94 %  Max: 99 %    · Telemetry: Sinus rhythm   · Constitutional: Alert. Oriented to person, place, and time.  No distress. · Head: Normocephalic and atraumatic. · Mouth/Throat: Lips appear moist. Oropharynx is clear and moist.  · Eyes: Conjunctivae normal. EOM are normal.   · Neck: Neck supple. No lymphadenopathy. No rigidity. No JVD present. · Cardiovascular: Normal rate, regular rhythm. Normal S1&S2. Carotid pulse 2+ bilaterally. · Pulmonary/Chest: Bilateral respiratory sounds present. No respiratory accessory muscle use. No wheezes, No rhonchi. · Abdominal: Soft. Normal bowel sounds present. No distension, No tenderness. No splenomegaly. No hernia. · Musculoskeletal: No tenderness. No edema    · Lymphadenopathy: Has no cervical adenopathy. · Neurological: Alert and oriented. Cranial nerve II-XII grossly intact, No gross deficit to touch. · Skin: Skin is warm and dry. No rash, lesions, ulcerations noted. · Psychiatric: No anxiety nor agitation. Labs, diagnostic and imaging results reviewed. Reviewed. Recent Labs     04/19/20  1150 04/20/20  0539    141   K 4.4 4.4    106   CO2 26 25   PHOS 3.9 3.9   BUN 8 6*   CREATININE 0.5* 0.6     Recent Labs     04/20/20  0539   WBC 9.8   HGB 10.3*   HCT 31.3*   MCV 88.7   *     Lab Results   Component Value Date    TROPONINI <0.01 04/16/2020     Estimated Creatinine Clearance: 121 mL/min (based on SCr of 0.6 mg/dL).    No results found for: BNP  Lab Results   Component Value Date    PROTIME 15.7 04/20/2020    PROTIME 18.1 04/17/2020    INR 1.35 04/20/2020    INR 1.55 04/17/2020     Lab Results   Component Value Date    CHOL 175 05/22/2014    HDL 46 05/22/2014    HDL 54 03/30/2011    TRIG 174 05/22/2014       Scheduled Meds:   sotalol  120 mg Oral BID    acetaminophen  1,000 mg Oral 3 times per day    sodium chloride flush  10 mL Intravenous 2 times per day    apixaban  5 mg Oral BID    atorvastatin  40 mg Oral Daily    gabapentin  100 mg Oral TID    pantoprazole  40 mg Oral QAM AC     Continuous Infusions:  PRN Meds:traMADol, sodium chloride flush, polyethylene glycol, promethazine **OR** [DISCONTINUED] ondansetron, nitroGLYCERIN     Patient Active Problem List    Diagnosis Date Noted    [facility] 04/16/2020    Debility 04/15/2020    Ovarian cancer on left ([facility] 04/08/2020    Coronary artery disease 10/09/2012    Hyperlipidemia 10/09/2012    HTN (hypertension) 10/09/2012    Alcohol abuse 10/09/2012    Tobacco abuse 10/09/2012      Active Hospital Problems    Diagnosis Date Noted    Atrial fibrillation with RVR ([facility] [I48.91] 04/16/2020       Assessment and Plan:     1.  Highly symptomatic paroxysmal atrial fibrillation  2.  Typical atrial flutter  3.  CAD, status post PCI  4.  Hypertension  5.  Hyperlipidemia  6.  Ovarian cancer, status post surgery     QTc interval within normal limits based on the telemetry  Continue sotalol 120 mg p.o. every 12 hours  No torsades noted on the telemetry  Pending COVID-19 results  If we have the results back, then we will proceed with TARIQ and atrial flutter ablation tomorrow  Down the road, she will need atrial fibrillation ablation for management of atrial fibrillation. Thank you for allowing me to participate in the care of this patient. If you have any questions, please do not hesitate to contact me.     Lilia Severin MD   Cardiac Electrophysiology  [facility]

## 2020-04-20 NOTE — PROGRESS NOTES
Hospital Medicine Progress Note    Chief Complaint:  Palpitations      Subjective/Interval Hx:    Doing well. No palpitations overnight  Denies any chest pain shortness of breath  Remains in sinus rhythm  For TARIQ and ablation today  Allergies:  Patient has no known allergies. REVIEW OF SYSTEMS:   Pertinent positives as noted in the HPI. All other systems reviewed and negative. PHYSICAL EXAM PERFORMED:    /73   Pulse 59   Temp 98.3 °F (36.8 °C) (Oral)   Resp 16   Ht 5' 9\" (1.753 m)   Wt 200 lb (90.7 kg)   SpO2 97%   BMI 29.53 kg/m²     General appearance:  No apparent distress, appears stated age and cooperative. HEENT:  Normal cephalic, atraumatic without obvious deformity. Pupils equal, round, and reactive to light. Extra ocular muscles intact. Conjunctivae/corneas clear. Neck: Supple, with full range of motion. No jugular venous distention. Trachea midline. Respiratory:  Normal respiratory effort. Clear to auscultation, bilaterally without Rales/Wheezes/Rhonchi. Cardiovascular:  Regular rhythm with normal S1/S2 without murmurs, rubs or gallops. Abdomen: Soft, non-tender, non-distended with normal bowel sounds. Musculoskeletal:  No clubbing, cyanosis, Trace edema bilaterally. Full range of motion without deformity. Skin: Skin color, texture, turgor normal.  No rashes or lesions. Neurologic:  Neurovascularly intact without any focal sensory/motor deficits. Cranial nerves: II-XII intact, grossly non-focal.    Labs:     Recent Labs     04/20/20  0539   WBC 9.8   HGB 10.3*   HCT 31.3*   *     Recent Labs     04/19/20  1150 04/20/20  0539    141   K 4.4 4.4    106   CO2 26 25   BUN 8 6*   CREATININE 0.5* 0.6   CALCIUM 8.7 8.6   PHOS 3.9 3.9     No results for input(s): AST, ALT, BILIDIR, BILITOT, ALKPHOS in the last 72 hours. Recent Labs     04/20/20  0539   INR 1.35*     No results for input(s): Lata Kylie in the last 72 hours.     Urinalysis:      Lab

## 2020-04-20 NOTE — ADT AUTH CERT
p.o. twice daily, which was initiated recently. Secondary to recurrent and symptomatic episodes, I have recommended aggressive rhythm control strategy   We will schedule atrial flutter ablation on Monday   She will need a TARIQ before proceeding with atrial flutter ablation as she was not on anticoagulation for more than 3 weeks       She will need antiarrhythmic therapy for her atrial fibrillation   We will start her on sotalol 120 mg p.o. every 12 hours   Down the road, she will need atrial fibrillation ablation for management of atrial fibrillation.       Discussed in detail about the indications, risk and benefits of the procedure.  Patient is willing to proceed.    N.p.o. after midnight on Sunday         Internal Medicine:   Atrial fibrillation/Atrial Flutter with RVR   -Transferred from acute rehab with NEIL CORNEJO   - She is having Afib and Aflutter episodes despite on Metoprolol, and amiodarone   - Discussed with Dr. Marline De Anda, starting on Sotalol from 9PM tonight   - She will need Atrial flutter ablation, since was restarted anticoagulation will need TARIQ prior to her back   - NM stress negative for acute ischmeia   -CHADs-VACs score of 4. continue Eliquis (no need to hold per EP)   -Continue telemetry, optimize electrolytes   -Potassium greater than 4, magnesium greater than 2   - With invasive procedure, and current COVID19 pandemic going on, will check COVID19 prior to procedure   - Droplet + isolation       Coronary disease status post stents 2008   -Continue aspirin, statin       Hypertension:   -Continue PTA BP medications       Ovarian cancer status post laparoscopic resection of the pelvic mass, complete hysterectomy and bilateral salpingo-oophorectomy on 04/08/2020       Left leg weakness/Numbness   -Likely secondary to extensive radical peritonealectomy and use of deep pelvic retractors       DVT Prophylaxis: on eliquis   Diet: DIET GENERAL;   Diet NPO, After Midnight   Code Status: Full Code       PT/OT

## 2020-04-21 ENCOUNTER — APPOINTMENT (OUTPATIENT)
Dept: CARDIAC CATH/INVASIVE PROCEDURES | Age: 60
DRG: 274 | End: 2020-04-21
Attending: INTERNAL MEDICINE
Payer: COMMERCIAL

## 2020-04-21 VITALS
DIASTOLIC BLOOD PRESSURE: 60 MMHG | SYSTOLIC BLOOD PRESSURE: 126 MMHG | RESPIRATION RATE: 5 BRPM | TEMPERATURE: 96.8 F | OXYGEN SATURATION: 96 %

## 2020-04-21 LAB
ANION GAP SERPL CALCULATED.3IONS-SCNC: 11 MMOL/L (ref 3–16)
BASOPHILS ABSOLUTE: 0 K/UL (ref 0–0.2)
BASOPHILS RELATIVE PERCENT: 0 %
BUN BLDV-MCNC: 8 MG/DL (ref 7–20)
CALCIUM SERPL-MCNC: 9.3 MG/DL (ref 8.3–10.6)
CHLORIDE BLD-SCNC: 103 MMOL/L (ref 99–110)
CO2: 24 MMOL/L (ref 21–32)
CREAT SERPL-MCNC: 0.6 MG/DL (ref 0.6–1.1)
EOSINOPHILS ABSOLUTE: 0.4 K/UL (ref 0–0.6)
EOSINOPHILS RELATIVE PERCENT: 4 %
GFR AFRICAN AMERICAN: >60
GFR NON-AFRICAN AMERICAN: >60
GLUCOSE BLD-MCNC: 95 MG/DL (ref 70–99)
HCT VFR BLD CALC: 34.3 % (ref 36–48)
HEMOGLOBIN: 11.3 G/DL (ref 12–16)
LV EF: 58 %
LVEF MODALITY: NORMAL
LYMPHOCYTES ABSOLUTE: 1.5 K/UL (ref 1–5.1)
LYMPHOCYTES RELATIVE PERCENT: 14 %
MAGNESIUM: 1.8 MG/DL (ref 1.8–2.4)
MCH RBC QN AUTO: 29.3 PG (ref 26–34)
MCHC RBC AUTO-ENTMCNC: 33 G/DL (ref 31–36)
MCV RBC AUTO: 88.6 FL (ref 80–100)
MONOCYTES ABSOLUTE: 1.3 K/UL (ref 0–1.3)
MONOCYTES RELATIVE PERCENT: 12 %
NEUTROPHILS ABSOLUTE: 7.7 K/UL (ref 1.7–7.7)
NEUTROPHILS RELATIVE PERCENT: 70 %
PDW BLD-RTO: 13.7 % (ref 12.4–15.4)
PLATELET # BLD: 553 K/UL (ref 135–450)
PMV BLD AUTO: 8 FL (ref 5–10.5)
POTASSIUM SERPL-SCNC: 4.4 MMOL/L (ref 3.5–5.1)
RBC # BLD: 3.87 M/UL (ref 4–5.2)
SODIUM BLD-SCNC: 138 MMOL/L (ref 136–145)
WBC # BLD: 11 K/UL (ref 4–11)

## 2020-04-21 PROCEDURE — 93621 COMP EP EVL L PAC&REC C SINS: CPT

## 2020-04-21 PROCEDURE — C1759 CATH, INTRA ECHOCARDIOGRAPHY: HCPCS

## 2020-04-21 PROCEDURE — 2580000003 HC RX 258: Performed by: NURSE ANESTHETIST, CERTIFIED REGISTERED

## 2020-04-21 PROCEDURE — 80048 BASIC METABOLIC PNL TOTAL CA: CPT

## 2020-04-21 PROCEDURE — 93325 DOPPLER ECHO COLOR FLOW MAPG: CPT | Performed by: INTERNAL MEDICINE

## 2020-04-21 PROCEDURE — 1200000000 HC SEMI PRIVATE

## 2020-04-21 PROCEDURE — C1732 CATH, EP, DIAG/ABL, 3D/VECT: HCPCS

## 2020-04-21 PROCEDURE — 6360000002 HC RX W HCPCS: Performed by: NURSE ANESTHETIST, CERTIFIED REGISTERED

## 2020-04-21 PROCEDURE — 6370000000 HC RX 637 (ALT 250 FOR IP): Performed by: INTERNAL MEDICINE

## 2020-04-21 PROCEDURE — 2580000003 HC RX 258: Performed by: INTERNAL MEDICINE

## 2020-04-21 PROCEDURE — 93613 INTRACARDIAC EPHYS 3D MAPG: CPT

## 2020-04-21 PROCEDURE — 3700000001 HC ADD 15 MINUTES (ANESTHESIA)

## 2020-04-21 PROCEDURE — 2500000003 HC RX 250 WO HCPCS: Performed by: NURSE ANESTHETIST, CERTIFIED REGISTERED

## 2020-04-21 PROCEDURE — 99024 POSTOP FOLLOW-UP VISIT: CPT | Performed by: INTERNAL MEDICINE

## 2020-04-21 PROCEDURE — 93662 INTRACARDIAC ECG (ICE): CPT | Performed by: INTERNAL MEDICINE

## 2020-04-21 PROCEDURE — 36415 COLL VENOUS BLD VENIPUNCTURE: CPT

## 2020-04-21 PROCEDURE — 93005 ELECTROCARDIOGRAM TRACING: CPT | Performed by: INTERNAL MEDICINE

## 2020-04-21 PROCEDURE — 4A0234Z MEASUREMENT OF CARDIAC ELECTRICAL ACTIVITY, PERCUTANEOUS APPROACH: ICD-10-PCS | Performed by: INTERNAL MEDICINE

## 2020-04-21 PROCEDURE — 93320 DOPPLER ECHO COMPLETE: CPT | Performed by: INTERNAL MEDICINE

## 2020-04-21 PROCEDURE — 2709999900 HC NON-CHARGEABLE SUPPLY

## 2020-04-21 PROCEDURE — 97530 THERAPEUTIC ACTIVITIES: CPT

## 2020-04-21 PROCEDURE — 6360000002 HC RX W HCPCS: Performed by: INTERNAL MEDICINE

## 2020-04-21 PROCEDURE — 02583ZZ DESTRUCTION OF CONDUCTION MECHANISM, PERCUTANEOUS APPROACH: ICD-10-PCS | Performed by: INTERNAL MEDICINE

## 2020-04-21 PROCEDURE — C1894 INTRO/SHEATH, NON-LASER: HCPCS

## 2020-04-21 PROCEDURE — 94761 N-INVAS EAR/PLS OXIMETRY MLT: CPT

## 2020-04-21 PROCEDURE — 7100000000 HC PACU RECOVERY - FIRST 15 MIN

## 2020-04-21 PROCEDURE — 7100000001 HC PACU RECOVERY - ADDTL 15 MIN

## 2020-04-21 PROCEDURE — 02K83ZZ MAP CONDUCTION MECHANISM, PERCUTANEOUS APPROACH: ICD-10-PCS | Performed by: INTERNAL MEDICINE

## 2020-04-21 PROCEDURE — 83735 ASSAY OF MAGNESIUM: CPT

## 2020-04-21 PROCEDURE — 93653 COMPRE EP EVAL TX SVT: CPT

## 2020-04-21 PROCEDURE — 93312 ECHO TRANSESOPHAGEAL: CPT | Performed by: INTERNAL MEDICINE

## 2020-04-21 PROCEDURE — C1730 CATH, EP, 19 OR FEW ELECT: HCPCS

## 2020-04-21 PROCEDURE — 85025 COMPLETE CBC W/AUTO DIFF WBC: CPT

## 2020-04-21 PROCEDURE — 3700000000 HC ANESTHESIA ATTENDED CARE

## 2020-04-21 PROCEDURE — 93613 INTRACARDIAC EPHYS 3D MAPG: CPT | Performed by: INTERNAL MEDICINE

## 2020-04-21 PROCEDURE — 97116 GAIT TRAINING THERAPY: CPT

## 2020-04-21 PROCEDURE — 97535 SELF CARE MNGMENT TRAINING: CPT

## 2020-04-21 PROCEDURE — 97162 PT EVAL MOD COMPLEX 30 MIN: CPT

## 2020-04-21 PROCEDURE — 93653 COMPRE EP EVAL TX SVT: CPT | Performed by: INTERNAL MEDICINE

## 2020-04-21 PROCEDURE — 2700000000 HC OXYGEN THERAPY PER DAY

## 2020-04-21 PROCEDURE — 97166 OT EVAL MOD COMPLEX 45 MIN: CPT

## 2020-04-21 PROCEDURE — 6360000002 HC RX W HCPCS: Performed by: ANESTHESIOLOGY

## 2020-04-21 PROCEDURE — 93621 COMP EP EVL L PAC&REC C SINS: CPT | Performed by: INTERNAL MEDICINE

## 2020-04-21 PROCEDURE — 4A023FZ MEASUREMENT OF CARDIAC RHYTHM, PERCUTANEOUS APPROACH: ICD-10-PCS | Performed by: INTERNAL MEDICINE

## 2020-04-21 PROCEDURE — 93662 INTRACARDIAC ECG (ICE): CPT

## 2020-04-21 RX ORDER — ONDANSETRON 2 MG/ML
4 INJECTION INTRAMUSCULAR; INTRAVENOUS
Status: COMPLETED | OUTPATIENT
Start: 2020-04-21 | End: 2020-04-21

## 2020-04-21 RX ORDER — OXYCODONE HYDROCHLORIDE AND ACETAMINOPHEN 5; 325 MG/1; MG/1
1 TABLET ORAL PRN
Status: DISCONTINUED | OUTPATIENT
Start: 2020-04-21 | End: 2020-04-21

## 2020-04-21 RX ORDER — FENTANYL CITRATE 50 UG/ML
50 INJECTION, SOLUTION INTRAMUSCULAR; INTRAVENOUS EVERY 5 MIN PRN
Status: DISCONTINUED | OUTPATIENT
Start: 2020-04-21 | End: 2020-04-21

## 2020-04-21 RX ORDER — LIDOCAINE HYDROCHLORIDE 20 MG/ML
INJECTION, SOLUTION INFILTRATION; PERINEURAL PRN
Status: DISCONTINUED | OUTPATIENT
Start: 2020-04-21 | End: 2020-04-21 | Stop reason: SDUPTHER

## 2020-04-21 RX ORDER — GLYCOPYRROLATE 0.2 MG/ML
INJECTION INTRAMUSCULAR; INTRAVENOUS PRN
Status: DISCONTINUED | OUTPATIENT
Start: 2020-04-21 | End: 2020-04-21 | Stop reason: SDUPTHER

## 2020-04-21 RX ORDER — PROCHLORPERAZINE EDISYLATE 5 MG/ML
5 INJECTION INTRAMUSCULAR; INTRAVENOUS
Status: DISCONTINUED | OUTPATIENT
Start: 2020-04-21 | End: 2020-04-21

## 2020-04-21 RX ORDER — SODIUM CHLORIDE 0.9 % (FLUSH) 0.9 %
10 SYRINGE (ML) INJECTION PRN
Status: DISCONTINUED | OUTPATIENT
Start: 2020-04-21 | End: 2020-04-22 | Stop reason: HOSPADM

## 2020-04-21 RX ORDER — LABETALOL 20 MG/4 ML (5 MG/ML) INTRAVENOUS SYRINGE
5 EVERY 10 MIN PRN
Status: DISCONTINUED | OUTPATIENT
Start: 2020-04-21 | End: 2020-04-21

## 2020-04-21 RX ORDER — PROPOFOL 10 MG/ML
INJECTION, EMULSION INTRAVENOUS PRN
Status: DISCONTINUED | OUTPATIENT
Start: 2020-04-21 | End: 2020-04-21 | Stop reason: SDUPTHER

## 2020-04-21 RX ORDER — ACETAMINOPHEN 325 MG/1
650 TABLET ORAL EVERY 4 HOURS PRN
Status: DISCONTINUED | OUTPATIENT
Start: 2020-04-21 | End: 2020-04-21 | Stop reason: SDUPTHER

## 2020-04-21 RX ORDER — MIDAZOLAM HYDROCHLORIDE 1 MG/ML
INJECTION INTRAMUSCULAR; INTRAVENOUS PRN
Status: DISCONTINUED | OUTPATIENT
Start: 2020-04-21 | End: 2020-04-21 | Stop reason: SDUPTHER

## 2020-04-21 RX ORDER — FENTANYL CITRATE 50 UG/ML
25 INJECTION, SOLUTION INTRAMUSCULAR; INTRAVENOUS EVERY 5 MIN PRN
Status: DISCONTINUED | OUTPATIENT
Start: 2020-04-21 | End: 2020-04-21

## 2020-04-21 RX ORDER — ACETAMINOPHEN 325 MG/1
650 TABLET ORAL EVERY 4 HOURS PRN
Status: DISCONTINUED | OUTPATIENT
Start: 2020-04-21 | End: 2020-04-22 | Stop reason: HOSPADM

## 2020-04-21 RX ORDER — PROTAMINE SULFATE 10 MG/ML
INJECTION, SOLUTION INTRAVENOUS PRN
Status: DISCONTINUED | OUTPATIENT
Start: 2020-04-21 | End: 2020-04-21 | Stop reason: SDUPTHER

## 2020-04-21 RX ORDER — SODIUM CHLORIDE 0.9 % (FLUSH) 0.9 %
10 SYRINGE (ML) INJECTION EVERY 12 HOURS SCHEDULED
Status: DISCONTINUED | OUTPATIENT
Start: 2020-04-21 | End: 2020-04-22 | Stop reason: HOSPADM

## 2020-04-21 RX ORDER — ROCURONIUM BROMIDE 10 MG/ML
INJECTION, SOLUTION INTRAVENOUS PRN
Status: DISCONTINUED | OUTPATIENT
Start: 2020-04-21 | End: 2020-04-21 | Stop reason: SDUPTHER

## 2020-04-21 RX ORDER — MEPERIDINE HYDROCHLORIDE 25 MG/ML
12.5 INJECTION INTRAMUSCULAR; INTRAVENOUS; SUBCUTANEOUS EVERY 5 MIN PRN
Status: DISCONTINUED | OUTPATIENT
Start: 2020-04-21 | End: 2020-04-21

## 2020-04-21 RX ORDER — HEPARIN SODIUM 1000 [USP'U]/ML
INJECTION, SOLUTION INTRAVENOUS; SUBCUTANEOUS PRN
Status: DISCONTINUED | OUTPATIENT
Start: 2020-04-21 | End: 2020-04-21 | Stop reason: SDUPTHER

## 2020-04-21 RX ORDER — OXYCODONE HYDROCHLORIDE AND ACETAMINOPHEN 5; 325 MG/1; MG/1
2 TABLET ORAL PRN
Status: DISCONTINUED | OUTPATIENT
Start: 2020-04-21 | End: 2020-04-21

## 2020-04-21 RX ORDER — MORPHINE SULFATE 2 MG/ML
1 INJECTION, SOLUTION INTRAMUSCULAR; INTRAVENOUS ONCE
Status: COMPLETED | OUTPATIENT
Start: 2020-04-21 | End: 2020-04-21

## 2020-04-21 RX ORDER — ONDANSETRON 2 MG/ML
4 INJECTION INTRAMUSCULAR; INTRAVENOUS
Status: DISCONTINUED | OUTPATIENT
Start: 2020-04-21 | End: 2020-04-21

## 2020-04-21 RX ORDER — DIPHENHYDRAMINE HYDROCHLORIDE 50 MG/ML
12.5 INJECTION INTRAMUSCULAR; INTRAVENOUS
Status: DISCONTINUED | OUTPATIENT
Start: 2020-04-21 | End: 2020-04-21

## 2020-04-21 RX ORDER — SUCCINYLCHOLINE CHLORIDE 20 MG/ML
INJECTION INTRAMUSCULAR; INTRAVENOUS PRN
Status: DISCONTINUED | OUTPATIENT
Start: 2020-04-21 | End: 2020-04-21 | Stop reason: SDUPTHER

## 2020-04-21 RX ORDER — SODIUM CHLORIDE, SODIUM LACTATE, POTASSIUM CHLORIDE, CALCIUM CHLORIDE 600; 310; 30; 20 MG/100ML; MG/100ML; MG/100ML; MG/100ML
INJECTION, SOLUTION INTRAVENOUS CONTINUOUS PRN
Status: DISCONTINUED | OUTPATIENT
Start: 2020-04-21 | End: 2020-04-21 | Stop reason: SDUPTHER

## 2020-04-21 RX ORDER — EPHEDRINE SULFATE 50 MG/ML
INJECTION INTRAVENOUS PRN
Status: DISCONTINUED | OUTPATIENT
Start: 2020-04-21 | End: 2020-04-21 | Stop reason: SDUPTHER

## 2020-04-21 RX ORDER — HYDRALAZINE HYDROCHLORIDE 20 MG/ML
5 INJECTION INTRAMUSCULAR; INTRAVENOUS EVERY 10 MIN PRN
Status: DISCONTINUED | OUTPATIENT
Start: 2020-04-21 | End: 2020-04-21

## 2020-04-21 RX ADMIN — HEPARIN SODIUM 4000 UNITS: 1000 INJECTION, SOLUTION INTRAVENOUS; SUBCUTANEOUS at 13:33

## 2020-04-21 RX ADMIN — ROCURONIUM BROMIDE 50 MG: 10 INJECTION, SOLUTION INTRAVENOUS at 12:43

## 2020-04-21 RX ADMIN — PHENYLEPHRINE HYDROCHLORIDE 100 MCG: 10 INJECTION, SOLUTION INTRAMUSCULAR; INTRAVENOUS; SUBCUTANEOUS at 12:53

## 2020-04-21 RX ADMIN — ACETAMINOPHEN 650 MG: 325 TABLET ORAL at 16:42

## 2020-04-21 RX ADMIN — APIXABAN 5 MG: 5 TABLET, FILM COATED ORAL at 20:21

## 2020-04-21 RX ADMIN — SUCCINYLCHOLINE CHLORIDE 100 MG: 20 INJECTION, SOLUTION INTRAMUSCULAR; INTRAVENOUS; PARENTERAL at 12:35

## 2020-04-21 RX ADMIN — EPHEDRINE SULFATE 10 MG: 50 INJECTION INTRAVENOUS at 13:33

## 2020-04-21 RX ADMIN — EPHEDRINE SULFATE 10 MG: 50 INJECTION INTRAVENOUS at 13:23

## 2020-04-21 RX ADMIN — EPHEDRINE SULFATE 10 MG: 50 INJECTION INTRAVENOUS at 13:49

## 2020-04-21 RX ADMIN — TRAMADOL HYDROCHLORIDE 50 MG: 50 TABLET, FILM COATED ORAL at 20:21

## 2020-04-21 RX ADMIN — ACETAMINOPHEN 1000 MG: 500 TABLET ORAL at 21:45

## 2020-04-21 RX ADMIN — FENTANYL CITRATE 100 MCG: 50 INJECTION INTRAMUSCULAR; INTRAVENOUS at 12:29

## 2020-04-21 RX ADMIN — SOTALOL HYDROCHLORIDE 120 MG: 80 TABLET ORAL at 20:21

## 2020-04-21 RX ADMIN — MIDAZOLAM HYDROCHLORIDE 2 MG: 2 INJECTION, SOLUTION INTRAMUSCULAR; INTRAVENOUS at 12:29

## 2020-04-21 RX ADMIN — SOTALOL HYDROCHLORIDE 120 MG: 80 TABLET ORAL at 07:49

## 2020-04-21 RX ADMIN — PHENYLEPHRINE HYDROCHLORIDE 200 MCG: 10 INJECTION, SOLUTION INTRAMUSCULAR; INTRAVENOUS; SUBCUTANEOUS at 14:36

## 2020-04-21 RX ADMIN — HEPARIN SODIUM 2000 UNITS: 1000 INJECTION, SOLUTION INTRAVENOUS; SUBCUTANEOUS at 13:50

## 2020-04-21 RX ADMIN — PROPOFOL 50 MG: 10 INJECTION, EMULSION INTRAVENOUS at 12:35

## 2020-04-21 RX ADMIN — PHENYLEPHRINE HYDROCHLORIDE 100 MCG: 10 INJECTION, SOLUTION INTRAMUSCULAR; INTRAVENOUS; SUBCUTANEOUS at 13:23

## 2020-04-21 RX ADMIN — PHENYLEPHRINE HYDROCHLORIDE 100 MCG: 10 INJECTION, SOLUTION INTRAMUSCULAR; INTRAVENOUS; SUBCUTANEOUS at 13:03

## 2020-04-21 RX ADMIN — GABAPENTIN 100 MG: 100 CAPSULE ORAL at 20:21

## 2020-04-21 RX ADMIN — ATORVASTATIN CALCIUM 40 MG: 40 TABLET, FILM COATED ORAL at 07:50

## 2020-04-21 RX ADMIN — Medication 10 ML: at 20:23

## 2020-04-21 RX ADMIN — MORPHINE SULFATE 1 MG: 2 INJECTION, SOLUTION INTRAMUSCULAR; INTRAVENOUS at 06:04

## 2020-04-21 RX ADMIN — ONDANSETRON 4 MG: 2 INJECTION INTRAMUSCULAR; INTRAVENOUS at 12:43

## 2020-04-21 RX ADMIN — PROTAMINE SULFATE 25 MG: 10 INJECTION, SOLUTION INTRAVENOUS at 14:28

## 2020-04-21 RX ADMIN — EPHEDRINE SULFATE 10 MG: 50 INJECTION INTRAVENOUS at 14:24

## 2020-04-21 RX ADMIN — SUGAMMADEX 200 MG: 100 INJECTION, SOLUTION INTRAVENOUS at 14:24

## 2020-04-21 RX ADMIN — LIDOCAINE HYDROCHLORIDE 100 MG: 20 INJECTION, SOLUTION INFILTRATION; PERINEURAL at 12:29

## 2020-04-21 RX ADMIN — EPHEDRINE SULFATE 10 MG: 50 INJECTION INTRAVENOUS at 14:10

## 2020-04-21 RX ADMIN — APIXABAN 5 MG: 5 TABLET, FILM COATED ORAL at 07:50

## 2020-04-21 RX ADMIN — PHENYLEPHRINE HYDROCHLORIDE 100 MCG: 10 INJECTION, SOLUTION INTRAMUSCULAR; INTRAVENOUS; SUBCUTANEOUS at 12:49

## 2020-04-21 RX ADMIN — SODIUM CHLORIDE, SODIUM LACTATE, POTASSIUM CHLORIDE, AND CALCIUM CHLORIDE: 600; 310; 30; 20 INJECTION, SOLUTION INTRAVENOUS at 12:23

## 2020-04-21 RX ADMIN — SODIUM CHLORIDE, SODIUM LACTATE, POTASSIUM CHLORIDE, AND CALCIUM CHLORIDE: 600; 310; 30; 20 INJECTION, SOLUTION INTRAVENOUS at 14:41

## 2020-04-21 RX ADMIN — LIDOCAINE HYDROCHLORIDE 100 MG: 20 INJECTION, SOLUTION INFILTRATION; PERINEURAL at 15:03

## 2020-04-21 RX ADMIN — GABAPENTIN 100 MG: 100 CAPSULE ORAL at 07:50

## 2020-04-21 RX ADMIN — GLYCOPYRROLATE 0.4 MG: 0.2 INJECTION, SOLUTION INTRAMUSCULAR; INTRAVENOUS at 13:09

## 2020-04-21 RX ADMIN — PHENYLEPHRINE HYDROCHLORIDE 200 MCG: 10 INJECTION, SOLUTION INTRAMUSCULAR; INTRAVENOUS; SUBCUTANEOUS at 14:49

## 2020-04-21 RX ADMIN — PHENYLEPHRINE HYDROCHLORIDE 100 MCG: 10 INJECTION, SOLUTION INTRAMUSCULAR; INTRAVENOUS; SUBCUTANEOUS at 13:50

## 2020-04-21 RX ADMIN — Medication 10 ML: at 07:51

## 2020-04-21 ASSESSMENT — PAIN DESCRIPTION - PAIN TYPE
TYPE: ACUTE PAIN

## 2020-04-21 ASSESSMENT — PULMONARY FUNCTION TESTS
PIF_VALUE: 27
PIF_VALUE: 20
PIF_VALUE: 1
PIF_VALUE: 25
PIF_VALUE: 26
PIF_VALUE: 3
PIF_VALUE: 5
PIF_VALUE: 2
PIF_VALUE: 1
PIF_VALUE: 25
PIF_VALUE: 26
PIF_VALUE: 3
PIF_VALUE: 3
PIF_VALUE: 25
PIF_VALUE: 5
PIF_VALUE: 24
PIF_VALUE: 21
PIF_VALUE: 27
PIF_VALUE: 26
PIF_VALUE: 25
PIF_VALUE: 4
PIF_VALUE: 26
PIF_VALUE: 24
PIF_VALUE: 1
PIF_VALUE: 26
PIF_VALUE: 2
PIF_VALUE: 28
PIF_VALUE: 28
PIF_VALUE: 26
PIF_VALUE: 26
PIF_VALUE: 5
PIF_VALUE: 3
PIF_VALUE: 1
PIF_VALUE: 28
PIF_VALUE: 25
PIF_VALUE: 3
PIF_VALUE: 26
PIF_VALUE: 25
PIF_VALUE: 26
PIF_VALUE: 28
PIF_VALUE: 26
PIF_VALUE: 29
PIF_VALUE: 3
PIF_VALUE: 25
PIF_VALUE: 1
PIF_VALUE: 4
PIF_VALUE: 3
PIF_VALUE: 4
PIF_VALUE: 4
PIF_VALUE: 24
PIF_VALUE: 5
PIF_VALUE: 4
PIF_VALUE: 4
PIF_VALUE: 28
PIF_VALUE: 25
PIF_VALUE: 6
PIF_VALUE: 25
PIF_VALUE: 5
PIF_VALUE: 27
PIF_VALUE: 3
PIF_VALUE: 27
PIF_VALUE: 1
PIF_VALUE: 28
PIF_VALUE: 26
PIF_VALUE: 20
PIF_VALUE: 4
PIF_VALUE: 25
PIF_VALUE: 26
PIF_VALUE: 4
PIF_VALUE: 5
PIF_VALUE: 21
PIF_VALUE: 25
PIF_VALUE: 1
PIF_VALUE: 27
PIF_VALUE: 3
PIF_VALUE: 2
PIF_VALUE: 25
PIF_VALUE: 28
PIF_VALUE: 24
PIF_VALUE: 4
PIF_VALUE: 26
PIF_VALUE: 20
PIF_VALUE: 26
PIF_VALUE: 4
PIF_VALUE: 26
PIF_VALUE: 21
PIF_VALUE: 25
PIF_VALUE: 3
PIF_VALUE: 27
PIF_VALUE: 2
PIF_VALUE: 4
PIF_VALUE: 27
PIF_VALUE: 25
PIF_VALUE: 4
PIF_VALUE: 26
PIF_VALUE: 25
PIF_VALUE: 26
PIF_VALUE: 25
PIF_VALUE: 4
PIF_VALUE: 4
PIF_VALUE: 1
PIF_VALUE: 25
PIF_VALUE: 25
PIF_VALUE: 20
PIF_VALUE: 27
PIF_VALUE: 28
PIF_VALUE: 26
PIF_VALUE: 25
PIF_VALUE: 26
PIF_VALUE: 25
PIF_VALUE: 25
PIF_VALUE: 2
PIF_VALUE: 26
PIF_VALUE: 26
PIF_VALUE: 6
PIF_VALUE: 29
PIF_VALUE: 25
PIF_VALUE: 2
PIF_VALUE: 30
PIF_VALUE: 2
PIF_VALUE: 25
PIF_VALUE: 26
PIF_VALUE: 28
PIF_VALUE: 24
PIF_VALUE: 1
PIF_VALUE: 4
PIF_VALUE: 4
PIF_VALUE: 2
PIF_VALUE: 25
PIF_VALUE: 4
PIF_VALUE: 26
PIF_VALUE: 1
PIF_VALUE: 24
PIF_VALUE: 4
PIF_VALUE: 1
PIF_VALUE: 25
PIF_VALUE: 29
PIF_VALUE: 25
PIF_VALUE: 4
PIF_VALUE: 26
PIF_VALUE: 28
PIF_VALUE: 27
PIF_VALUE: 25
PIF_VALUE: 4
PIF_VALUE: 1
PIF_VALUE: 26
PIF_VALUE: 2
PIF_VALUE: 26
PIF_VALUE: 3
PIF_VALUE: 1
PIF_VALUE: 24
PIF_VALUE: 4
PIF_VALUE: 1
PIF_VALUE: 25
PIF_VALUE: 25
PIF_VALUE: 20
PIF_VALUE: 27
PIF_VALUE: 2
PIF_VALUE: 29
PIF_VALUE: 25
PIF_VALUE: 20
PIF_VALUE: 4

## 2020-04-21 ASSESSMENT — PAIN DESCRIPTION - LOCATION
LOCATION: ABDOMEN;HEAD;LEG
LOCATION: ABDOMEN
LOCATION: HEAD;ABDOMEN

## 2020-04-21 ASSESSMENT — PAIN DESCRIPTION - ORIENTATION
ORIENTATION: RIGHT;LEFT
ORIENTATION: RIGHT;MID;LEFT
ORIENTATION: RIGHT;MID;LEFT

## 2020-04-21 ASSESSMENT — PAIN DESCRIPTION - ONSET
ONSET: ON-GOING

## 2020-04-21 ASSESSMENT — LIFESTYLE VARIABLES: SMOKING_STATUS: 1

## 2020-04-21 ASSESSMENT — PAIN SCALES - GENERAL
PAINLEVEL_OUTOF10: 6
PAINLEVEL_OUTOF10: 8
PAINLEVEL_OUTOF10: 6
PAINLEVEL_OUTOF10: 6
PAINLEVEL_OUTOF10: 0
PAINLEVEL_OUTOF10: 7

## 2020-04-21 ASSESSMENT — PAIN DESCRIPTION - PROGRESSION
CLINICAL_PROGRESSION: NOT CHANGED

## 2020-04-21 ASSESSMENT — PAIN DESCRIPTION - DESCRIPTORS
DESCRIPTORS: ACHING;HEADACHE
DESCRIPTORS: ACHING
DESCRIPTORS: ACHING

## 2020-04-21 ASSESSMENT — PAIN DESCRIPTION - FREQUENCY
FREQUENCY: CONTINUOUS

## 2020-04-21 ASSESSMENT — PAIN - FUNCTIONAL ASSESSMENT
PAIN_FUNCTIONAL_ASSESSMENT: ACTIVITIES ARE NOT PREVENTED

## 2020-04-21 NOTE — PROGRESS NOTES
[facility]IN  Entrance Stairs - Rails: Right  Bathroom Shower/Tub: Tub/Shower unit  Bathroom Toilet: Standard  Bathroom Equipment: Shower chair  Bathroom Accessibility: Accessible  Home Equipment: 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 Pt's sister in law, brother in law, and grandson are staying with pt and  right now. They are able to assist while pt is recovering. Objective   Vision: Impaired  Vision Exceptions: Wears glasses at all times  Hearing: Within functional limits    Orientation  Overall Orientation Status: Within Functional Limits  Orientation Level: Oriented X4  Observation/Palpation  Posture: Good  Observation: LLE KI donned/readjusted   Balance  Sitting Balance: Supervision  Standing Balance: Minimal assistance(KI in place; No buckling of LLE noted)  Standing Balance  Time: 5 mins, 5 mins, 3 mins   Activity: functional mobility, stance for ADLs  Comment: Patient with previous fall overnight due to not ambulating with LLE KI. Functional Mobility  Functional - Mobility Device: Rolling Walker  Activity: To/from bathroom  Assist Level: Minimal assistance  Functional Mobility Comments: Max verbal cues to sequence RW and complete step-to gait pattern.

## 2020-04-21 NOTE — PROGRESS NOTES
PACU Transfer Note    Vitals:    04/21/20 1630   BP: (!) 116/53   Pulse: 59   Resp: 16   Temp: 96.8 °F (36 °C)   SpO2:        In: 110 [P.O.:60; I.V.:50]  Out: 20     Pain assessment:  bp in range  Pain Level: 6    Report given to Receiving unit RN.    4/21/2020 4:35 PM

## 2020-04-21 NOTE — ANESTHESIA POSTPROCEDURE EVALUATION
Department of Anesthesiology  Postprocedure Note    Patient: Miranda Chu  MRN: [id number]  YOB: 1960  Date of evaluation: 4/21/2020  Time:  6:32 PM     Procedure Summary     Date:  04/21/20 Room / Location:  [facility] Cath Lab    Anesthesia Start:  7187 Anesthesia Stop:  3060    Procedures:       CATH LAB WITH ANESTHESIA      CATH LAB WITH ANESTHESIA (canceled) Diagnosis:      Scheduled Providers:   Responsible Provider:  Jovanni Gomez MD    Anesthesia Type:  general ASA Status:  3          Anesthesia Type: general    Gallo Phase I: Gallo Score: 9    Gallo Phase II:      Last vitals: Reviewed and per EMR flowsheets.        Anesthesia Post Evaluation    Patient location during evaluation: PACU  Patient participation: complete - patient participated  Level of consciousness: awake  Pain score: 2  Airway patency: patent  Nausea & Vomiting: no nausea and no vomiting  Complications: no  Cardiovascular status: hemodynamically stable  Respiratory status: acceptable  Hydration status: euvolemic

## 2020-04-21 NOTE — PROGRESS NOTES
This RN was in assisting patient to the bathroom. Patient was using the walker coming from the bathroom and per patient said that her left knee gave out on her and landed on it. Patient reports no apparent injury, I called Luisa Angelo to come and help, she came with the PCA, both helped me get the patient up to the bed. Messaged Dr. Jess Haile, he said to continue to monitor her leg for swelling. Will continue to monitor.

## 2020-04-21 NOTE — PROGRESS NOTES
distress. · Head: Normocephalic and atraumatic. · Mouth/Throat: Lips appear moist. Oropharynx is clear and moist.  · Eyes: Conjunctivae normal. EOM are normal.   · Neck: Neck supple. No lymphadenopathy. No rigidity. No JVD present. · Cardiovascular: Normal rate, regular rhythm. Normal S1&S2. Carotid pulse 2+ bilaterally. · Pulmonary/Chest: Bilateral respiratory sounds present. No respiratory accessory muscle use. No wheezes, No rhonchi. · Abdominal: Soft. Normal bowel sounds present. No distension, No tenderness. No splenomegaly. No hernia. · Musculoskeletal: No tenderness. No edema    · Lymphadenopathy: Has no cervical adenopathy. · Neurological: Alert and oriented. Cranial nerve II-XII grossly intact, No gross deficit to touch. · Skin: Skin is warm and dry. No rash, lesions, ulcerations noted. · Psychiatric: No anxiety nor agitation. Labs, diagnostic and imaging results reviewed. Reviewed. Recent Labs     04/19/20  1150 04/20/20  0539 04/21/20  0622    141 138   K 4.4 4.4 4.4    106 103   CO2 26 25 24   PHOS 3.9 3.9  --    BUN 8 6* 8   CREATININE 0.5* 0.6 0.6     Recent Labs     04/20/20  0539 04/21/20  0622   WBC 9.8 11.0   HGB 10.3* 11.3*   HCT 31.3* 34.3*   MCV 88.7 88.6   * 553*     Lab Results   Component Value Date    TROPONINI <0.01 04/16/2020     Estimated Creatinine Clearance: 121 mL/min (based on SCr of 0.6 mg/dL).    No results found for: BNP  Lab Results   Component Value Date    PROTIME 15.7 04/20/2020    PROTIME 18.1 04/17/2020    INR 1.35 04/20/2020    INR 1.55 04/17/2020     Lab Results   Component Value Date    CHOL 175 05/22/2014    HDL 46 05/22/2014    HDL 54 03/30/2011    TRIG 174 05/22/2014       Scheduled Meds:   sotalol  120 mg Oral BID    acetaminophen  1,000 mg Oral 3 times per day    sodium chloride flush  10 mL Intravenous 2 times per day    apixaban  5 mg Oral BID    atorvastatin  40 mg Oral Daily    gabapentin  100 mg Oral TID   

## 2020-04-21 NOTE — PROGRESS NOTES
Physical Therapy    Facility/Department: [address]  Initial Assessment / treatment    NAME: Luis Rollins  : 1960  MRN: [id number]    Date of Service: 2020    Discharge Recommendations: Luis Rollins scored a 17/24 on the AM-PAC short mobility form. Current research shows that an AM-PAC score of 17 or less is typically not associated with a discharge to the patient's home setting. Based on the patients AM-PAC score and their current functional mobility deficits, it is recommended that the patient have 5-7 sessions per week of Physical Therapy at d/c to increase the patients independence. If patient discharges prior to next session this note will serve as a discharge summary. Please see below for the latest assessment towards goals. PT Equipment Recommendations  Equipment Needed: No  Other: defer to next level of care    Assessment   Body structures, Functions, Activity limitations: Decreased functional mobility ; Decreased balance;Decreased strength;Decreased sensation  Assessment: Pt demos good participation and effort. Mobility limited by L quad weakness. Frequent v/c given for safe amb with walker and safety with transfers. Rec continued IP PT prior to eventual return home with family. Treatment Diagnosis: impaired mobility  Prognosis: Good  Decision Making: Medium Complexity  PT Education: Goals;PT Role;Transfer Training;Gait Training;General Safety; Functional Mobility Training  Patient Education: Pt verbalized understanding  REQUIRES PT FOLLOW UP: Yes       Patient Diagnosis(es): There were no encounter diagnoses. has a past medical history of Cancer [facility]), Coronary artery disease, Heart disease, Hyperlipemia, Hypertension, and MI, old.   has a past surgical history that includes Tubal ligation; Cardiac surgery (, ); sinus surgery (); and Hysterectomy (Bilateral, 2020).     Restrictions  Restrictions/Precautions  Restrictions/Precautions: included gait and transfer training, pt education. Plan   Plan  Times per week: 5-7  Current Treatment Recommendations: Transfer Training, Strengthening, ROM, Balance Training, Gait Training, Functional Mobility Training, Safety Education & Training, Patient/Caregiver Education & Training, Stair training  Safety Devices  Type of devices: Left in chair, Chair alarm in place, Call light within reach, Nurse notified, Gait belt    G-Code       OutComes Score                                                  AM-PAC Score  AM-PAC Inpatient Mobility Raw Score : 17 (04/21/20 1222)  AM-PAC Inpatient T-Scale Score : 42.13 (04/21/20 1222)  Mobility Inpatient CMS 0-100% Score: 50.57 (04/21/20 1222)  Mobility Inpatient CMS G-Code Modifier : CK (04/21/20 1222)          Goals  Short term goals  Time Frame for Short term goals: discharge  Short term goal 1: Pt will transfer sit <--> stand with SBA  Short term goal 2: Pt will ambulate 80 ft with RW and SBA  Short term goal 3: Pt will increase L quad strength to 3/5  Short term goal 4: -  Short term goal 5: -  Patient Goals   Patient goals : d/c to ARU then eventually return home with family       Therapy Time   Individual Concurrent Group Co-treatment   Time In 0917         Time Out 1000         Minutes 43                 Timed Code Treatment Minutes:  28    Total Treatment Minutes:  43    If patient is discharged prior to next treatment, this note will serve as the discharge summary.   Chacho Dolan, PT, DPT  [id number]

## 2020-04-21 NOTE — PLAN OF CARE
Problem: Musculor/Skeletal Functional Status  Intervention: OT Evaluation/treatment  Note: Increase patients ADLs/functional status to baseline.

## 2020-04-21 NOTE — PLAN OF CARE
Problem: Falls - Risk of:  Goal: Will remain free from falls  Description: Will remain free from falls  4/21/2020 1400 by Nina Sahu RN  Outcome: Ongoing  Note: Patient at risk for falls. Patient resting quietly in bed. Side rails up x 2/4. Bed locked in lowest position. Bed alarm on. Bedside table and call light within reach. Patient instructed to call for assistance. Patient verbalized understanding. Will continue to monitor.

## 2020-04-22 ENCOUNTER — HOSPITAL ENCOUNTER (INPATIENT)
Age: 60
LOS: 7 days | Discharge: HOME | DRG: 948 | End: 2020-04-29
Attending: PHYSICAL MEDICINE & REHABILITATION | Admitting: PHYSICAL MEDICINE & REHABILITATION
Payer: COMMERCIAL

## 2020-04-22 VITALS
HEART RATE: 64 BPM | TEMPERATURE: 98.2 F | BODY MASS INDEX: 29.62 KG/M2 | RESPIRATION RATE: 16 BRPM | HEIGHT: 69 IN | OXYGEN SATURATION: 93 % | WEIGHT: 200 LBS | DIASTOLIC BLOOD PRESSURE: 64 MMHG | SYSTOLIC BLOOD PRESSURE: 111 MMHG

## 2020-04-22 LAB
EKG ATRIAL RATE: 56 BPM
EKG ATRIAL RATE: 57 BPM
EKG DIAGNOSIS: NORMAL
EKG DIAGNOSIS: NORMAL
EKG P AXIS: 51 DEGREES
EKG P AXIS: 63 DEGREES
EKG P-R INTERVAL: 136 MS
EKG P-R INTERVAL: 138 MS
EKG Q-T INTERVAL: 466 MS
EKG Q-T INTERVAL: 466 MS
EKG QRS DURATION: 80 MS
EKG QRS DURATION: 84 MS
EKG QTC CALCULATION (BAZETT): 449 MS
EKG QTC CALCULATION (BAZETT): 453 MS
EKG R AXIS: 63 DEGREES
EKG R AXIS: 65 DEGREES
EKG T AXIS: 30 DEGREES
EKG T AXIS: 75 DEGREES
EKG VENTRICULAR RATE: 56 BPM
EKG VENTRICULAR RATE: 57 BPM

## 2020-04-22 PROCEDURE — 99232 SBSQ HOSP IP/OBS MODERATE 35: CPT | Performed by: NURSE PRACTITIONER

## 2020-04-22 PROCEDURE — 93010 ELECTROCARDIOGRAM REPORT: CPT | Performed by: INTERNAL MEDICINE

## 2020-04-22 PROCEDURE — 6360000002 HC RX W HCPCS: Performed by: NURSE PRACTITIONER

## 2020-04-22 PROCEDURE — 6370000000 HC RX 637 (ALT 250 FOR IP): Performed by: INTERNAL MEDICINE

## 2020-04-22 PROCEDURE — 6370000000 HC RX 637 (ALT 250 FOR IP): Performed by: PHYSICAL MEDICINE & REHABILITATION

## 2020-04-22 PROCEDURE — 2580000003 HC RX 258: Performed by: INTERNAL MEDICINE

## 2020-04-22 PROCEDURE — 93005 ELECTROCARDIOGRAM TRACING: CPT | Performed by: INTERNAL MEDICINE

## 2020-04-22 PROCEDURE — 85347 COAGULATION TIME ACTIVATED: CPT

## 2020-04-22 PROCEDURE — 1280000000 HC REHAB R&B

## 2020-04-22 RX ORDER — TRAMADOL HYDROCHLORIDE 50 MG/1
50 TABLET ORAL EVERY 8 HOURS PRN
Status: DISCONTINUED | OUTPATIENT
Start: 2020-04-22 | End: 2020-04-29 | Stop reason: HOSPADM

## 2020-04-22 RX ORDER — SOTALOL HYDROCHLORIDE 120 MG/1
120 TABLET ORAL 2 TIMES DAILY
Qty: 60 TABLET | Refills: 3 | Status: ON HOLD | OUTPATIENT
Start: 2020-04-22 | End: 2020-04-23

## 2020-04-22 RX ORDER — ATORVASTATIN CALCIUM 40 MG/1
40 TABLET, FILM COATED ORAL DAILY
Status: CANCELLED | OUTPATIENT
Start: 2020-04-23

## 2020-04-22 RX ORDER — POLYETHYLENE GLYCOL 3350 17 G/17G
17 POWDER, FOR SOLUTION ORAL DAILY PRN
Status: CANCELLED | OUTPATIENT
Start: 2020-04-22

## 2020-04-22 RX ORDER — GABAPENTIN 100 MG/1
100 CAPSULE ORAL 3 TIMES DAILY
Status: CANCELLED | OUTPATIENT
Start: 2020-04-22

## 2020-04-22 RX ORDER — ATORVASTATIN CALCIUM 40 MG/1
40 TABLET, FILM COATED ORAL DAILY
Status: DISCONTINUED | OUTPATIENT
Start: 2020-04-23 | End: 2020-04-29 | Stop reason: HOSPADM

## 2020-04-22 RX ORDER — GABAPENTIN 100 MG/1
100 CAPSULE ORAL 3 TIMES DAILY
Status: DISCONTINUED | OUTPATIENT
Start: 2020-04-22 | End: 2020-04-29 | Stop reason: HOSPADM

## 2020-04-22 RX ORDER — ACETAMINOPHEN 325 MG/1
650 TABLET ORAL EVERY 6 HOURS
Status: CANCELLED | OUTPATIENT
Start: 2020-04-22

## 2020-04-22 RX ORDER — PANTOPRAZOLE SODIUM 40 MG/1
40 TABLET, DELAYED RELEASE ORAL
Status: CANCELLED | OUTPATIENT
Start: 2020-04-23

## 2020-04-22 RX ORDER — TRAMADOL HYDROCHLORIDE 50 MG/1
50 TABLET ORAL EVERY 8 HOURS PRN
Status: CANCELLED | OUTPATIENT
Start: 2020-04-22

## 2020-04-22 RX ORDER — POLYETHYLENE GLYCOL 3350 17 G/17G
17 POWDER, FOR SOLUTION ORAL DAILY PRN
Status: DISCONTINUED | OUTPATIENT
Start: 2020-04-22 | End: 2020-04-29 | Stop reason: HOSPADM

## 2020-04-22 RX ORDER — MAGNESIUM SULFATE IN WATER 40 MG/ML
2 INJECTION, SOLUTION INTRAVENOUS ONCE
Status: COMPLETED | OUTPATIENT
Start: 2020-04-22 | End: 2020-04-22

## 2020-04-22 RX ORDER — PROMETHAZINE HYDROCHLORIDE 12.5 MG/1
12.5 TABLET ORAL EVERY 6 HOURS PRN
Status: CANCELLED | OUTPATIENT
Start: 2020-04-22

## 2020-04-22 RX ORDER — SOTALOL HYDROCHLORIDE 80 MG/1
120 TABLET ORAL 2 TIMES DAILY
Status: CANCELLED | OUTPATIENT
Start: 2020-04-22

## 2020-04-22 RX ORDER — PANTOPRAZOLE SODIUM 40 MG/1
40 TABLET, DELAYED RELEASE ORAL
Status: DISCONTINUED | OUTPATIENT
Start: 2020-04-23 | End: 2020-04-29 | Stop reason: HOSPADM

## 2020-04-22 RX ORDER — NITROGLYCERIN 0.4 MG/1
0.4 TABLET SUBLINGUAL EVERY 5 MIN PRN
Status: CANCELLED | OUTPATIENT
Start: 2020-04-22

## 2020-04-22 RX ORDER — PROMETHAZINE HYDROCHLORIDE 25 MG/1
12.5 TABLET ORAL EVERY 6 HOURS PRN
Status: DISCONTINUED | OUTPATIENT
Start: 2020-04-22 | End: 2020-04-29 | Stop reason: HOSPADM

## 2020-04-22 RX ORDER — SOTALOL HYDROCHLORIDE 80 MG/1
120 TABLET ORAL 2 TIMES DAILY
Status: DISCONTINUED | OUTPATIENT
Start: 2020-04-22 | End: 2020-04-29 | Stop reason: HOSPADM

## 2020-04-22 RX ORDER — NITROGLYCERIN 0.4 MG/1
0.4 TABLET SUBLINGUAL EVERY 5 MIN PRN
Status: DISCONTINUED | OUTPATIENT
Start: 2020-04-22 | End: 2020-04-29 | Stop reason: HOSPADM

## 2020-04-22 RX ADMIN — MAGNESIUM SULFATE HEPTAHYDRATE 2 G: 40 INJECTION, SOLUTION INTRAVENOUS at 14:22

## 2020-04-22 RX ADMIN — TRAMADOL HYDROCHLORIDE 50 MG: 50 TABLET, FILM COATED ORAL at 21:39

## 2020-04-22 RX ADMIN — APIXABAN 5 MG: 5 TABLET, FILM COATED ORAL at 21:39

## 2020-04-22 RX ADMIN — APIXABAN 5 MG: 5 TABLET, FILM COATED ORAL at 08:12

## 2020-04-22 RX ADMIN — GABAPENTIN 100 MG: 100 CAPSULE ORAL at 21:39

## 2020-04-22 RX ADMIN — ATORVASTATIN CALCIUM 40 MG: 40 TABLET, FILM COATED ORAL at 08:11

## 2020-04-22 RX ADMIN — Medication 10 ML: at 08:12

## 2020-04-22 RX ADMIN — PANTOPRAZOLE SODIUM 40 MG: 40 TABLET, DELAYED RELEASE ORAL at 05:30

## 2020-04-22 RX ADMIN — GABAPENTIN 100 MG: 100 CAPSULE ORAL at 14:22

## 2020-04-22 RX ADMIN — ACETAMINOPHEN 1000 MG: 500 TABLET ORAL at 14:22

## 2020-04-22 RX ADMIN — SOTALOL HYDROCHLORIDE 120 MG: 80 TABLET ORAL at 21:39

## 2020-04-22 RX ADMIN — ACETAMINOPHEN 1000 MG: 500 TABLET ORAL at 05:30

## 2020-04-22 RX ADMIN — SOTALOL HYDROCHLORIDE 120 MG: 80 TABLET ORAL at 09:05

## 2020-04-22 RX ADMIN — GABAPENTIN 100 MG: 100 CAPSULE ORAL at 08:11

## 2020-04-22 ASSESSMENT — PAIN DESCRIPTION - LOCATION
LOCATION: ABDOMEN

## 2020-04-22 ASSESSMENT — PAIN DESCRIPTION - PAIN TYPE
TYPE: ACUTE PAIN
TYPE: ACUTE PAIN
TYPE: SURGICAL PAIN
TYPE: ACUTE PAIN;SURGICAL PAIN
TYPE: ACUTE PAIN

## 2020-04-22 ASSESSMENT — PAIN DESCRIPTION - PROGRESSION
CLINICAL_PROGRESSION: NOT CHANGED

## 2020-04-22 ASSESSMENT — PAIN DESCRIPTION - ORIENTATION
ORIENTATION: RIGHT;LEFT
ORIENTATION: MID

## 2020-04-22 ASSESSMENT — PAIN DESCRIPTION - ONSET
ONSET: ON-GOING

## 2020-04-22 ASSESSMENT — PAIN - FUNCTIONAL ASSESSMENT
PAIN_FUNCTIONAL_ASSESSMENT: ACTIVITIES ARE NOT PREVENTED

## 2020-04-22 ASSESSMENT — PAIN DESCRIPTION - FREQUENCY
FREQUENCY: INTERMITTENT
FREQUENCY: CONTINUOUS

## 2020-04-22 ASSESSMENT — PAIN SCALES - GENERAL
PAINLEVEL_OUTOF10: 0
PAINLEVEL_OUTOF10: 7
PAINLEVEL_OUTOF10: 5
PAINLEVEL_OUTOF10: 7
PAINLEVEL_OUTOF10: 8
PAINLEVEL_OUTOF10: 6

## 2020-04-22 ASSESSMENT — PAIN DESCRIPTION - DESCRIPTORS
DESCRIPTORS: ACHING
DESCRIPTORS: DISCOMFORT
DESCRIPTORS: ACHING

## 2020-04-22 NOTE — CONSULTS
Consult Note  Physical Medicine and Rehabilitation    Patient: Conchita Sebastian  [id number]  Date: 4/22/2020      Chief Complaint: Cardiac debility- Afib     History of Present Illness/Hospital Course: This is a 63yo F with a past medical history including ovarian cancer, CAD, HLD, HTN, ETOH abuse, tobacco abuse and tachycardia who came into the hospital for surgical management of ovarian cancer. She underwent a laparoscopic resection of the pelvic mass, complete hysterectomy, and bilateral salpingo-oophorectomy. She has been recovering in med/surg but developed tachycardia post op. Cardiology and internal medicine were consulted and managed with metoprolol. She continued to be very weak after her surgery and struggled with therapies. She was scoring 17/24 with physical therapy today due to poor L quad strength and knee buckling. She came to the ARU on 4/15 but had to be discharged 4/17 due to Afib in RVR- She was started on Sotolol back on telemetry and she improved over the past few days. She had a TARIQ and a cath with an ablation. Since she has been recovering well and worked with therapy again with similar scores as pre-ARU admit. She is motivated and would like to come to the ARU post acute admit to continue to work in therapy to be able to discharge home independently.     Prior Level of Function:  Independent for mobility, ADLs, and IADLs    Current Level of Function:  Mod assist     Pertinent Social History:  Support: family support at home      Past Medical History:   Diagnosis Date    Cancer [facility])     ovary    Coronary artery disease 2008    2 stents,     Heart disease     Hyperlipemia     Hypertension     MI, old 2008    2 stents, requiered another stent in 2009        Past Surgical History:   Procedure Laterality Date   [address] 43  2008, 2009    stents X3    HYSTERECTOMY Bilateral 4/8/2020    DIAGNOSTIC LAPAROSCOPY,; LAPAROTOMY RESECTION OF A PELVIC MASS, COMPLETE HYSTERECTOMY, BILATERAL SALPINGO-OOPHORECTOMY, PELVIC LYMPHADENECTOMY,  OMENTECTOMY, APPENDECTOMY; RADICAL PERIMETRECTOMY; TUMOR DEBULKING performed by Kentrell Corey DO at [address] [place] [address]  2008    TUBAL LIGATION         Family History   Problem Relation Age of Onset    Heart Disease Mother     Heart Disease Father     High Blood Pressure Father     Stroke Paternal Grandmother        Social History     Socioeconomic History    Marital status:      Spouse name: None    Number of children: None    Years of education: None    Highest education level: None   Occupational History    None   Social Needs    Financial resource strain: None    Food insecurity     Worry: None     Inability: None    Transportation needs     Medical: None     Non-medical: None   Tobacco Use    Smoking status: Current Every Day Smoker     Packs/day: 0.50     Types: Cigarettes    Smokeless tobacco: Never Used    Tobacco comment: down to half a pk/day   Substance and Sexual Activity    Alcohol use: No     Alcohol/week: 0.0 standard drinks    Drug use: No    Sexual activity: None   Lifestyle    Physical activity     Days per week: None     Minutes per session: None    Stress: None   Relationships    Social connections     Talks on phone: None     Gets together: None     Attends Worship service: None     Active member of club or organization: None     Attends meetings of clubs or organizations: None     Relationship status: None    Intimate partner violence     Fear of current or ex partner: None     Emotionally abused: None     Physically abused: None     Forced sexual activity: None   Other Topics Concern    None   Social History Narrative    None           REVIEW OF SYSTEMS:   CONSTITUTIONAL: negative for fevers, chills, diaphoresis, appetite change, night sweats, unexpected weight change, fatigue  EYES: negative for blurred vision, eye discharge, visual disturbance and icterus  HEENT: negative for hearing loss, tinnitus, ear drainage, sinus pressure, nasal congestion, epistaxis and snoring  RESPIRATORY: Negative for hemoptysis, cough, sputum production  CARDIOVASCULAR: negative for chest pain, palpitations, exertional chest pressure/discomfort, syncope, edema  GASTROINTESTINAL: negative for nausea, vomiting, diarrhea, blood in stool, abdominal pain, constipation  GENITOURINARY: negative for frequency, dysuria, urinary incontinence, decreased urine volume, and hematuria  HEMATOLOGIC/LYMPHATIC: negative for easy bruising, bleeding and lymphadenopathy  ALLERGIC/IMMUNOLOGIC: negative for recurrent infections, angioedema, anaphylaxis and drug reactions  ENDOCRINE: negative for weight changes and diabetic symptoms including polyuria, polydipsia and polyphagia  MUSCULOSKELETAL: Positive for pain, joint swelling, decreased range of motion  NEUROLOGICAL: negative for headaches, slurred speech, Positive for unilateral weakness- LLE  PSYCHIATRIC/BEHAVIORAL: negative for hallucinations, behavioral problems, confusion and agitation.      Physical Examination:  Vitals:   Patient Vitals for the past 24 hrs:   BP Temp Temp src Pulse Resp SpO2   04/22/20 1237 114/69 97.6 °F (36.4 °C) Oral 61 16 94 %   04/22/20 0807 (!) 92/54 98.1 °F (36.7 °C) Oral 58 16 95 %   04/22/20 0351 (!) 103/56 98 °F (36.7 °C) Oral 60 16 91 %   04/22/20 0007 (!) 97/54 97.5 °F (36.4 °C) Oral 64 16 95 %   04/21/20 2100 102/62 98.2 °F (36.8 °C) Oral 63 18 94 %   04/21/20 2000 108/63 97.9 °F (36.6 °C) Oral 62 18 95 %   04/21/20 1854 99/61 97.2 °F (36.2 °C) Oral 64 18 93 %   04/21/20 1757 121/71 97.3 °F (36.3 °C) Oral (!) 47 16 97 %   04/21/20 1700 115/67 97.1 °F (36.2 °C) Oral 52 18 96 %   04/21/20 1630 (!) 116/53 96.8 °F (36 °C) Temporal 59 16 --   04/21/20 1629 -- -- -- -- -- 95 %   04/21/20 1615 103/65 -- -- 67 20 95 %   04/21/20 1600 (!) 104/59 -- -- 60 17 94 %   04/21/20 1545 (!) 101/54 -- -- 61 17 90 %   04/21/20 1530 (!) 94/55 -- -- 71 16 (!) 86 %   04/21/20 1520 98/64 -- -- 71 18 97 %   04/21/20 1515 110/65 -- -- -- -- --   04/21/20 1513 119/68 96.8 °F (36 °C) Temporal 66 16 94 %     Const: Alert. WDWN. No distress  Eyes: Conjunctiva noninjected, no icterus noted; pupils equal, round, and reactive to light. HENT: Atraumatic, normocephalic; Oral mucosa moist  Neck: Trachea midline, neck supple. No thyromegaly noted. CV: Regular rate and rhythm, no murmur rub or gallop noted  Resp: Lungs clear to auscultation bilaterally, no rales wheezes or ronchi, no retractions. Respirations unlabored. GI: Soft, nontender, nondistended. Normal bowel sounds. No palpable masses. Skin: Normal temperature and turgor. No rashes or breakdown noted. Ext: No significant edema appreciated. No varicosities. MSK: No joint tenderness, erythema, warmth noted.  PROM intact. Neuro: Alert, oriented, appropriate. No cranial nerve deficits appreciated. Sensation intact to light touch. Motor examination reveals normal strength in all four limbs diffusely except for the L quad strength 3/5.  Reflexes normal and symmetric. Psych: Stable mood, normal judgement, normal affect     Lab Results   Component Value Date    WBC 11.0 04/21/2020    HGB 11.3 (L) 04/21/2020    HCT 34.3 (L) 04/21/2020    MCV 88.6 04/21/2020     (H) 04/21/2020     Lab Results   Component Value Date    INR 1.35 (H) 04/20/2020    INR 1.55 (H) 04/17/2020    PROTIME 15.7 (H) 04/20/2020    PROTIME 18.1 (H) 04/17/2020     Lab Results   Component Value Date    CREATININE 0.6 04/21/2020    BUN 8 04/21/2020     04/21/2020    K 4.4 04/21/2020     04/21/2020    CO2 24 04/21/2020     Lab Results   Component Value Date    ALT 13 04/15/2020    AST 21 04/15/2020    ALKPHOS 90 04/15/2020    BILITOT 0.3 04/15/2020         NM Cardiac Stress Test Nuclear Imaging   Final Result            Assessment:  LLE weakness  -Likely related to surgery- L quad strength is diminished with knee buckling at unexpected times.  She is working with therapy with an AMPAC score of 17/24. PT/OT in the ARU.     Atrial fibrillation with RVR, possibly paroxysmal  - Continue Metoprolol 50 mg bid, digoxin  -CHADs-VACs score of 4. continue Eliquis  - Cardiology consulted  - NSR with Sotalol      Hypomagnesemia  -monitor and replace     Coronary disease status post stents  -Continue aspirin, statin     Hypertension  -Continue monitor blood pressure     Ovarian cancer status post laparoscopic resection of the pelvic mass, complete hysterectomy and bilateral salpingo-oophorectomy  -Management as per oncology           Impairments- Decreased functional mobility, Decreased ADLs    Recommendations:  ARU admit     Patient with new functional deficits and ongoing medical complexity. Demonstrates ability to tolerate 3 hours therapy/day. She is a good candidate for acute inpatient rehab when medically appropriate. Thank you for this consult. Please contact me with any questions or concerns.      Jayy Larsen D.O. M.P.H  PM&R  4/22/2020  2:18 PM

## 2020-04-22 NOTE — DISCHARGE INSTR - COC
Signs: /69   Pulse 61   Temp 97.6 °F (36.4 °C) (Oral)   Resp 16   Ht 5' 9\" (1.753 m)   Wt 200 lb (90.7 kg)   SpO2 94%   BMI 29.53 kg/m²     Last documented pain score (0-10 scale): Pain Level: 5  Last Weight:   Wt Readings from Last 1 Encounters:   04/16/20 200 lb (90.7 kg)     Mental Status:  {IP PT MENTAL STATUS:20030}    IV Access:  { ED IV ACCESS:228167211}    Nursing Mobility/ADLs:  Walking   {CHP DME PITP:266737342}  Transfer  {CHP DME ILHJ:691827269}  Bathing  {CHP DME XNCB:835445569}  Dressing  {CHP DME JYIM:549795287}  Toileting  {CHP DME SMIO:354953474}  Feeding  {CHP DME OVQY:894714926}  Med Admin  {P DME HCFW:902987440}  Med Delivery   { ED MED Delivery:331823232}    Wound Care Documentation and Therapy:        Elimination:  Continence:   · Bowel: {YES / AC:43312}  · Bladder: {YES / MO:13608}  Urinary Catheter: {Urinary Catheter:785971530}   Colostomy/Ileostomy/Ileal Conduit: {YES / LD:35388}       Date of Last BM: ***    Intake/Output Summary (Last 24 hours) at 4/22/2020 1330  Last data filed at 4/22/2020 0859  Gross per 24 hour   Intake 1230 ml   Output 370 ml   Net 860 ml     I/O last 3 completed shifts:   In: 46 [P.O.:60; I.V.:850]  Out: 370 [Urine:350; Blood:20]    Safety Concerns:     [address] Safety Concerns:758204171}    Impairments/Disabilities:      [address] Impairments/Disabilities:828826110}    Nutrition Therapy:  Current Nutrition Therapy:   [address] Diet List:680939851}    Routes of Feeding: {CHP DME Other Feedings:427498278}  Liquids: {Slp liquid thickness:87805}  Daily Fluid Restriction: {CHP DME Yes amt example:080455085}  Last Modified Barium Swallow with Video (Video Swallowing Test): {Done Not Done XCJI:107086439}    Treatments at the Time of Hospital Discharge:   Respiratory Treatments: ***  Oxygen Therapy:  {Therapy; copd oxygen:61910}  Ventilator:    {MH CC Vent WVLE:374595084}    Rehab Therapies: {THERAPEUTIC INTERVENTION:2649176103}  Weight Bearing

## 2020-04-22 NOTE — PROGRESS NOTES
Pt admitted into room 3114. Vital signs as charted. Pt oriented to room and call light placed within reach. For patient safety, Visual Surveillance is in place, reviewed with patient/family, verbalized understanding. RN will continue to monitor Pt.

## 2020-04-22 NOTE — DISCHARGE SUMMARY
Hospital Medicine Discharge Summary    Patient ID: Miranda Elder      Patient's PCP: Dago Zayas MD    Admit Date: 4/16/2020     Discharge Date:      Admitting Physician: Minesh Law MD     Discharge Physician: Merle España MD     Discharge Diagnoses:  Atrial fibrillation/Atrial Flutter with RVR   HTN  CAD  Left leg weakness         Active Hospital Problems    Diagnosis Date Noted    Atrial fibrillation with RVR [facility]) [I48.91] 04/16/2020       The patient was seen and examined on day of discharge and this discharge summary is in conjunction with any daily progress note from day of discharge. Hospital Course:  Patient was cleared by cardiology for discharge. Discontinue Metoprolol, Digoxin and continue sotalol. Patient will be discharged in stable condition.      Atrial fibrillation/Atrial Flutter with RVR   - Transferred from acute rehab with NEIL jakcson RVR  - She was having Afib and Aflutter episodes despite on Metoprolol, and amiodarone  - NM stress negative for acute ischmeia  - CHADs-VACs score of 4. continue Eliquis (no need to hold per EP)  - Continue telemetry, optimize electrolytes  - Potassium greater than 4, magnesium greater than 2  - Started on Sotalol 04/18 -- NOW IN NSR  For TARIQ and possible ablation today  - With invasive procedure, and current COVID19 pandemic going on, will check COVID19 prior to procedure  - Droplet + isolation     Coronary disease status post stents 2008  -Continue aspirin, statin     Hypertension:  -Continue PTA BP medications     Ovarian cancer status post laparoscopic resection of the pelvic mass, complete hysterectomy and bilateral salpingo-oophorectomy on 04/08/2020     Left leg weakness/Numbness  -Likely secondary to extensive radical peritonealectomy and use of deep pelvic retractors    Exam:     /69   Pulse 61   Temp 97.6 °F (36.4 °C) (Oral)   Resp 16   Ht 5' 9\" (1.753 m)   Wt 200 lb (90.7 kg)   SpO2 94%   BMI 29.53 kg/m²     General mouth 3 times daily. atorvastatin (LIPITOR) 40 MG tablet TAKE ONE TABLET BY MOUTH ONCE DAILY  Qty: 30 tablet, Refills: 0      nitroGLYCERIN (NITROSTAT) 0.4 MG SL tablet Place 1 tablet under the tongue every 5 minutes as needed. Qty: 25 tablet, Refills: 1             Time Spent on discharge is more than 30 mints in the examination, evaluation, counseling and review of medications and discharge plan. Signed:    Gina Santana MD   4/22/2020      Thank you Rina Rankin MD for the opportunity to be involved in this patient's care. If you have any questions or concerns please feel free to contact me at [phone].

## 2020-04-23 PROCEDURE — 97535 SELF CARE MNGMENT TRAINING: CPT

## 2020-04-23 PROCEDURE — 97110 THERAPEUTIC EXERCISES: CPT

## 2020-04-23 PROCEDURE — 6370000000 HC RX 637 (ALT 250 FOR IP): Performed by: PHYSICAL MEDICINE & REHABILITATION

## 2020-04-23 PROCEDURE — 97162 PT EVAL MOD COMPLEX 30 MIN: CPT

## 2020-04-23 PROCEDURE — 1280000000 HC REHAB R&B

## 2020-04-23 PROCEDURE — 99232 SBSQ HOSP IP/OBS MODERATE 35: CPT | Performed by: INTERNAL MEDICINE

## 2020-04-23 PROCEDURE — 97166 OT EVAL MOD COMPLEX 45 MIN: CPT

## 2020-04-23 PROCEDURE — 97530 THERAPEUTIC ACTIVITIES: CPT

## 2020-04-23 PROCEDURE — 97116 GAIT TRAINING THERAPY: CPT

## 2020-04-23 RX ORDER — ACETAMINOPHEN 325 MG/1
650 TABLET ORAL EVERY 4 HOURS PRN
Status: DISCONTINUED | OUTPATIENT
Start: 2020-04-23 | End: 2020-04-29 | Stop reason: HOSPADM

## 2020-04-23 RX ORDER — ACETAMINOPHEN 325 MG/1
650 TABLET ORAL EVERY 6 HOURS
Status: DISCONTINUED | OUTPATIENT
Start: 2020-04-23 | End: 2020-04-29 | Stop reason: HOSPADM

## 2020-04-23 RX ADMIN — SOTALOL HYDROCHLORIDE 120 MG: 80 TABLET ORAL at 07:52

## 2020-04-23 RX ADMIN — PANTOPRAZOLE SODIUM 40 MG: 40 TABLET, DELAYED RELEASE ORAL at 06:54

## 2020-04-23 RX ADMIN — GABAPENTIN 100 MG: 100 CAPSULE ORAL at 14:04

## 2020-04-23 RX ADMIN — BISACODYL 5 MG: 5 TABLET, COATED ORAL at 07:53

## 2020-04-23 RX ADMIN — ATORVASTATIN CALCIUM 40 MG: 40 TABLET, FILM COATED ORAL at 07:51

## 2020-04-23 RX ADMIN — APIXABAN 5 MG: 5 TABLET, FILM COATED ORAL at 20:32

## 2020-04-23 RX ADMIN — GABAPENTIN 100 MG: 100 CAPSULE ORAL at 20:32

## 2020-04-23 RX ADMIN — SOTALOL HYDROCHLORIDE 120 MG: 80 TABLET ORAL at 20:32

## 2020-04-23 RX ADMIN — GABAPENTIN 100 MG: 100 CAPSULE ORAL at 07:52

## 2020-04-23 RX ADMIN — ACETAMINOPHEN 650 MG: 325 TABLET ORAL at 20:35

## 2020-04-23 RX ADMIN — PROMETHAZINE HYDROCHLORIDE 12.5 MG: 25 TABLET ORAL at 09:23

## 2020-04-23 RX ADMIN — ACETAMINOPHEN 650 MG: 325 TABLET ORAL at 14:04

## 2020-04-23 RX ADMIN — TRAMADOL HYDROCHLORIDE 50 MG: 50 TABLET, FILM COATED ORAL at 16:15

## 2020-04-23 RX ADMIN — ACETAMINOPHEN 650 MG: 325 TABLET ORAL at 09:23

## 2020-04-23 RX ADMIN — APIXABAN 5 MG: 5 TABLET, FILM COATED ORAL at 07:51

## 2020-04-23 RX ADMIN — TRAMADOL HYDROCHLORIDE 50 MG: 50 TABLET, FILM COATED ORAL at 06:54

## 2020-04-23 ASSESSMENT — PAIN DESCRIPTION - DESCRIPTORS
DESCRIPTORS: BURNING
DESCRIPTORS: HEADACHE
DESCRIPTORS: DULL;HEADACHE
DESCRIPTORS: HEADACHE;SHARP
DESCRIPTORS: HEADACHE
DESCRIPTORS: HEADACHE;SHARP
DESCRIPTORS: SHARP;HEADACHE
DESCRIPTORS: HEADACHE
DESCRIPTORS: BURNING;DISCOMFORT

## 2020-04-23 ASSESSMENT — PAIN DESCRIPTION - ORIENTATION
ORIENTATION: MID

## 2020-04-23 ASSESSMENT — PAIN SCALES - GENERAL
PAINLEVEL_OUTOF10: 5
PAINLEVEL_OUTOF10: 8
PAINLEVEL_OUTOF10: 7
PAINLEVEL_OUTOF10: 0
PAINLEVEL_OUTOF10: 9
PAINLEVEL_OUTOF10: 8
PAINLEVEL_OUTOF10: 6
PAINLEVEL_OUTOF10: 9
PAINLEVEL_OUTOF10: 9
PAINLEVEL_OUTOF10: 8
PAINLEVEL_OUTOF10: 6

## 2020-04-23 ASSESSMENT — PAIN DESCRIPTION - PROGRESSION
CLINICAL_PROGRESSION: GRADUALLY IMPROVING
CLINICAL_PROGRESSION: GRADUALLY WORSENING
CLINICAL_PROGRESSION: NOT CHANGED
CLINICAL_PROGRESSION: GRADUALLY IMPROVING

## 2020-04-23 ASSESSMENT — PAIN DESCRIPTION - LOCATION
LOCATION: HEAD
LOCATION: HEAD
LOCATION: ABDOMEN
LOCATION: ABDOMEN
LOCATION: HEAD

## 2020-04-23 ASSESSMENT — PAIN DESCRIPTION - ONSET
ONSET: ON-GOING

## 2020-04-23 ASSESSMENT — PAIN DESCRIPTION - PAIN TYPE
TYPE: SURGICAL PAIN
TYPE: ACUTE PAIN
TYPE: SURGICAL PAIN
TYPE: ACUTE PAIN
TYPE: ACUTE PAIN

## 2020-04-23 ASSESSMENT — PAIN DESCRIPTION - FREQUENCY
FREQUENCY: INTERMITTENT
FREQUENCY: INTERMITTENT
FREQUENCY: CONTINUOUS

## 2020-04-23 ASSESSMENT — PAIN - FUNCTIONAL ASSESSMENT
PAIN_FUNCTIONAL_ASSESSMENT: ACTIVITIES ARE NOT PREVENTED

## 2020-04-23 NOTE — H&P
stents X3    HYSTERECTOMY Bilateral 4/8/2020    DIAGNOSTIC LAPAROSCOPY,; LAPAROTOMY RESECTION OF A PELVIC MASS, COMPLETE HYSTERECTOMY, BILATERAL SALPINGO-OOPHORECTOMY, PELVIC LYMPHADENECTOMY,  OMENTECTOMY, APPENDECTOMY; RADICAL PERIMETRECTOMY; TUMOR DEBULKING performed by Stoney Meyers DO at [address] [place] [address]    TUBAL LIGATION       Major Surgery in past 100 days: Yes    Family History   Problem Relation Age of Onset    Heart Disease Mother     Heart Disease Father     High Blood Pressure Father     Stroke Paternal Grandmother        Social History     Socioeconomic History    Marital status:      Spouse name: Not on file    Number of children: Not on file    Years of education: Not on file    Highest education level: Not on file   Occupational History    Not on file   Social Needs    Financial resource strain: Not on file    Food insecurity     Worry: Not on file     Inability: Not on file    Transportation needs     Medical: Not on file     Non-medical: Not on file   Tobacco Use    Smoking status: Current Every Day Smoker     Packs/day: 0.50     Types: Cigarettes    Smokeless tobacco: Never Used    Tobacco comment: down to half a pk/day   Substance and Sexual Activity    Alcohol use: No     Alcohol/week: 0.0 standard drinks    Drug use: No    Sexual activity: Not on file   Lifestyle    Physical activity     Days per week: Not on file     Minutes per session: Not on file    Stress: Not on file   Relationships    Social connections     Talks on phone: Not on file     Gets together: Not on file     Attends Zoroastrian service: Not on file     Active member of club or organization: Not on file     Attends meetings of clubs or organizations: Not on file     Relationship status: Not on file    Intimate partner violence     Fear of current or ex partner: Not on file     Emotionally abused: Not on file     Physically abused: Not on file     Forced sexual activity: Not on file

## 2020-04-23 NOTE — PROGRESS NOTES
Pt is awake, A&O, sitting up in chair at bedside;  states slept well, Reports headache at this time. Assessment completed as documented in flow sheet; Plan of care reviewed with patient who verbalized understanding. Meds reviewed; VSS; Educated patient on fall risk prevention and general safety; Patient set up with breakfast; Fresh water given, Fall precautions in place, chair alarm is active,chair wheels are locked;  Call light, over bed table and personal items in reach. Patient denies other needs at this time. Will continue to monitor.  Ted Briceño MSN, MA, RN

## 2020-04-23 NOTE — PROGRESS NOTES
Physical Therapy    Facility/Department: [facility] ACUTE REHAB UNIT  Initial Assessment/Treatment note    NAME: Peter Dick  : 1960  MRN: [id number]    Date of Service: 2020    Discharge Recommendations:  Home with assist PRN, Home with Home health PT   PT Equipment Recommendations  Equipment Needed: No    Assessment   Body structures, Functions, Activity limitations: Decreased functional mobility ; Decreased balance;Decreased strength;Decreased sensation  Assessment: Pt is a 62 yo female who is currently functioning well below her baseline for all functional mobility following an extensive abdominal surgery with subsequent cardiac complications and L quad weakness. Pt currently requiring the use of a KI for improved knee stability along with VC and physical assitance to increase safety with all mobility. Pt would benefit form continued therapy to increase her independence prior to d/c home. Treatment Diagnosis: Decreased independence with functional mobility. Prognosis: Excellent  Decision Making: Medium Complexity  REQUIRES PT FOLLOW UP: Yes  Activity Tolerance  Activity Tolerance: Patient limited by endurance; Patient limited by fatigue       Patient Diagnosis(es): There were no encounter diagnoses. has a past medical history of Cancer [facility]), Coronary artery disease, Heart disease, Hyperlipemia, Hypertension, and MI, old.   has a past surgical history that includes Tubal ligation; Cardiac surgery (, ); sinus surgery (); and Hysterectomy (Bilateral, 2020).     Restrictions  Restrictions/Precautions  Restrictions/Precautions: General Precautions  Required Braces or Orthoses  Left Lower Extremity Brace: Knee Immobilizer  Position Activity Restriction  Other position/activity restrictions: up as tolerated; KI (per previous therapy notes due to quad weakness)  Vision/Hearing  Vision: Impaired  Vision Exceptions: Wears glasses at all times  Hearing: Within functional limits Subjective  General  Chart Reviewed: Yes  Additional Pertinent Hx: Pt is s/p laparoscopic resection of the pelvic mass, complete hysterectomy and bilateral salpingo-oophorectomy on 04/08/2020 2* ovarian CA; developed quad/L LE weakness after surgery; transferred to ARU; transfer to  4/16 2* tachycardia and a-fib; PMHx: HTN, HLD, CAD, ovarian CA, MI, heart disease  Referring Practitioner: Dr. Sherry Tierney  Diagnosis: a-fib with RVR  Follows Commands: Within Functional Limits  General Comment  Comments: Pt was sitting in the bedside chair upon arrival. Pt agreeable to PT intervention. Subjective  Subjective: Pt states that she feels good, just has a headache from caffeine withdrawal  Pain Screening  Patient Currently in Pain: (Headache)  Vital Signs  Patient Currently in Pain: (Headache)       Orientation  Orientation  Overall Orientation Status: Within Normal Limits  Social/Functional History  Social/Functional History  Lives With: Spouse( is on disability and requires 24hr care.  States her brother-in-law and sister-in-law will stay with them upon d/c.)  Type of Home: Mobile home(double wide)  Home Layout: One level  Home Access: Stairs to enter with rails  Entrance Stairs - Number of Steps: 3 RAMIN  Entrance Stairs - Rails: Right  Bathroom Shower/Tub: Tub/Shower unit  Bathroom Toilet: Standard  Bathroom Equipment: Shower chair(RTS with arms)  Bathroom Accessibility: Accessible  Home Equipment: Nilda Templeton is used for )  Receives Help From: (None needed PTA)  ADL Assistance: Independent  Homemaking Assistance: Independent  Homemaking Responsibilities: Yes  Meal Prep Responsibility: Primary  Laundry Responsibility: Primary  Cleaning Responsibility: Primary  Bill Paying/Finance Responsibility: Primary  Shopping Responsibility: Primary  Dependent Care Responsibility: Primary(Cares for  )  Health Care Management: Primary(manages  and personal meds))  Ambulation Assistance: moving better       Therapy Time   Individual Concurrent Group Co-treatment   Time In 1015         Time Out 1115         Minutes Kingsley Zuniga, ARNALDO      Second Session Therapy Time:   Individual Concurrent Group Co-treatment   Time In 1300         Time Out 1330         Minutes 30           Timed Code Treatment Minutes:  30    Total Treatment Minutes:  [address] [facility] [address], [place], [place] [address]   (Performed 2nd session treatment and documentation only)

## 2020-04-23 NOTE — CARE COORDINATION
Yes    [address] [facility], [address] [phone] - F [phone]  [address] [place] [address]  Phone: [phone] Fax: [address] [facility], [address] - P [phone] - F [phone]  [address] [address]  Phone: [phone] Fax: [address], [address]  [address] [facility] [address]  Phone: [phone] Fax: [phone]    [facility], [address] [phone] - F [phone]  [address] [facility] [address]  Phone: [phone] Fax: [phone]      Potential assistance Purchasing Medications:  No  Does Patient want to participate in local refill/ meds to beds program?:  Yes    Meds To Beds General Rules:  1. Can ONLY be done Monday- Friday between 8:30am-5pm  2. Prescription(s) must be in pharmacy by 3pm to be filled same day  3. Copy of patient's insurance/ prescription drug card and patient face sheet must be sent along with the prescription(s)  4. Cost of Rx cannot be added to hospital bill. If financial assistance is needed, please contact unit  or ;  or  CANNOT provide pharmacy voucher for patients co-pays  5.  Patients can then  the prescription on their way out of the hospital at discharge, or pharmacy can deliver to the bedside if staff is available. (payment due at time of pick-up or delivery - cash, check, or card accepted)     Able to afford home medications/ co-pay costs: Yes    ADLS  Support Systems: Family members/Spouse     PT AM-PAC:  17 /24  OT AM-PAC:  17 /24    [place]: Lives in a mobile home with  (disabled and needs 24/7 care), brother and sister-in-law and grandson currently staying with them  Steps: 3 with rt handrail    Plans to RETURN to current housing: Yes  Barriers to RETURNING to current housing: Medical necessity    Durable Medical Equipment at home: Walker___Cane_X_RTS__ BSC__Shower Chair_X__  02__ HHN__ CPAP__  BiPap__  Hospital Bed__ W/C_X ('s)__ Other____Grab bars______     DISCHARGE PLAN:  Disposition: Home with Home Health Care: TBD     Transportation PLAN for discharge: family     Factors facilitating achievement of predicted outcomes: Family support and Cooperative    Barriers to discharge: Decreased endurance and Lower extremity weakness    Additional Case Management Notes: Patient plans to return home with , brother and sister -in-law. The Plan for Transition of Care is related to the following treatment goals of Debility [R53.81]    The Patient and/or patient representative Jackie Gray and her family were provided with a choice of provider and agrees with the discharge plan Yes    Freedom of choice list was provided with basic dialogue that supports the patient's individualized plan of care/goals and shares the quality data associated with the providers.  Yes    Care Transition patient: No    AUNDREA Sosa, [facility]  Case Management Department  Ph: [phone]  Fax: [phone]

## 2020-04-24 LAB
ANION GAP SERPL CALCULATED.3IONS-SCNC: 10 MMOL/L (ref 3–16)
BASOPHILS ABSOLUTE: 0.1 K/UL (ref 0–0.2)
BASOPHILS RELATIVE PERCENT: 0.8 %
BUN BLDV-MCNC: 6 MG/DL (ref 7–20)
CALCIUM SERPL-MCNC: 9.1 MG/DL (ref 8.3–10.6)
CHLORIDE BLD-SCNC: 105 MMOL/L (ref 99–110)
CO2: 26 MMOL/L (ref 21–32)
CREAT SERPL-MCNC: 0.7 MG/DL (ref 0.6–1.1)
EOSINOPHILS ABSOLUTE: 1.4 K/UL (ref 0–0.6)
EOSINOPHILS RELATIVE PERCENT: 15.4 %
GFR AFRICAN AMERICAN: >60
GFR NON-AFRICAN AMERICAN: >60
GLUCOSE BLD-MCNC: 128 MG/DL (ref 70–99)
HCT VFR BLD CALC: 32 % (ref 36–48)
HEMOGLOBIN: 10.6 G/DL (ref 12–16)
LYMPHOCYTES ABSOLUTE: 1.7 K/UL (ref 1–5.1)
LYMPHOCYTES RELATIVE PERCENT: 17.6 %
MCH RBC QN AUTO: 29 PG (ref 26–34)
MCHC RBC AUTO-ENTMCNC: 33.1 G/DL (ref 31–36)
MCV RBC AUTO: 87.6 FL (ref 80–100)
MONOCYTES ABSOLUTE: 0.6 K/UL (ref 0–1.3)
MONOCYTES RELATIVE PERCENT: 6 %
NEUTROPHILS ABSOLUTE: 5.7 K/UL (ref 1.7–7.7)
NEUTROPHILS RELATIVE PERCENT: 60.2 %
PDW BLD-RTO: 14.3 % (ref 12.4–15.4)
PLATELET # BLD: 463 K/UL (ref 135–450)
PMV BLD AUTO: 8.4 FL (ref 5–10.5)
POC ACT LR: 158 SEC
POC ACT LR: 213 SEC
POC ACT LR: 242 SEC
POC ACT LR: 253 SEC
POTASSIUM REFLEX MAGNESIUM: 4.7 MMOL/L (ref 3.5–5.1)
RBC # BLD: 3.65 M/UL (ref 4–5.2)
SODIUM BLD-SCNC: 141 MMOL/L (ref 136–145)
WBC # BLD: 9.4 K/UL (ref 4–11)

## 2020-04-24 PROCEDURE — 85025 COMPLETE CBC W/AUTO DIFF WBC: CPT

## 2020-04-24 PROCEDURE — 97530 THERAPEUTIC ACTIVITIES: CPT

## 2020-04-24 PROCEDURE — 97535 SELF CARE MNGMENT TRAINING: CPT

## 2020-04-24 PROCEDURE — 6370000000 HC RX 637 (ALT 250 FOR IP): Performed by: PHYSICAL MEDICINE & REHABILITATION

## 2020-04-24 PROCEDURE — 36415 COLL VENOUS BLD VENIPUNCTURE: CPT

## 2020-04-24 PROCEDURE — 1280000000 HC REHAB R&B

## 2020-04-24 PROCEDURE — 80048 BASIC METABOLIC PNL TOTAL CA: CPT

## 2020-04-24 PROCEDURE — 97110 THERAPEUTIC EXERCISES: CPT

## 2020-04-24 PROCEDURE — 97116 GAIT TRAINING THERAPY: CPT

## 2020-04-24 RX ADMIN — SOTALOL HYDROCHLORIDE 120 MG: 80 TABLET ORAL at 20:37

## 2020-04-24 RX ADMIN — APIXABAN 5 MG: 5 TABLET, FILM COATED ORAL at 20:37

## 2020-04-24 RX ADMIN — ACETAMINOPHEN 650 MG: 325 TABLET ORAL at 15:10

## 2020-04-24 RX ADMIN — SOTALOL HYDROCHLORIDE 120 MG: 80 TABLET ORAL at 09:02

## 2020-04-24 RX ADMIN — PANTOPRAZOLE SODIUM 40 MG: 40 TABLET, DELAYED RELEASE ORAL at 05:41

## 2020-04-24 RX ADMIN — ACETAMINOPHEN 650 MG: 325 TABLET ORAL at 09:03

## 2020-04-24 RX ADMIN — TRAMADOL HYDROCHLORIDE 50 MG: 50 TABLET, FILM COATED ORAL at 20:37

## 2020-04-24 RX ADMIN — ACETAMINOPHEN 650 MG: 325 TABLET ORAL at 03:44

## 2020-04-24 RX ADMIN — GABAPENTIN 100 MG: 100 CAPSULE ORAL at 09:02

## 2020-04-24 RX ADMIN — ACETAMINOPHEN 650 MG: 325 TABLET ORAL at 20:37

## 2020-04-24 RX ADMIN — GABAPENTIN 100 MG: 100 CAPSULE ORAL at 15:10

## 2020-04-24 RX ADMIN — GABAPENTIN 100 MG: 100 CAPSULE ORAL at 20:37

## 2020-04-24 RX ADMIN — ATORVASTATIN CALCIUM 40 MG: 40 TABLET, FILM COATED ORAL at 09:03

## 2020-04-24 RX ADMIN — APIXABAN 5 MG: 5 TABLET, FILM COATED ORAL at 09:03

## 2020-04-24 RX ADMIN — BISACODYL 5 MG: 5 TABLET, COATED ORAL at 09:03

## 2020-04-24 ASSESSMENT — PAIN DESCRIPTION - DESCRIPTORS
DESCRIPTORS: BURNING
DESCRIPTORS: SHARP;BURNING
DESCRIPTORS: BURNING

## 2020-04-24 ASSESSMENT — PAIN SCALES - GENERAL
PAINLEVEL_OUTOF10: 7
PAINLEVEL_OUTOF10: 0
PAINLEVEL_OUTOF10: 0
PAINLEVEL_OUTOF10: 6
PAINLEVEL_OUTOF10: 7

## 2020-04-24 ASSESSMENT — PAIN DESCRIPTION - ORIENTATION
ORIENTATION: MID

## 2020-04-24 ASSESSMENT — PAIN DESCRIPTION - PAIN TYPE
TYPE: SURGICAL PAIN

## 2020-04-24 ASSESSMENT — PAIN DESCRIPTION - LOCATION
LOCATION: ABDOMEN

## 2020-04-24 ASSESSMENT — PAIN DESCRIPTION - FREQUENCY
FREQUENCY: INTERMITTENT

## 2020-04-24 ASSESSMENT — PAIN DESCRIPTION - ONSET
ONSET: ON-GOING

## 2020-04-24 NOTE — PROGRESS NOTES
Patient medicated for headache, returned to bed to rest after therapy. Lying on right side, Will continue to monitor. SR up x 2, HOB flat, Call light in reach. Bed in lowest position.  Cheri Loh MSN, MA, RN

## 2020-04-24 NOTE — PROGRESS NOTES
salpingo-oophorectomy  -Management as per oncology     Rehab Progress: Improving  Anticipated Dispo: home  Services/DME: TBD  ELOS: 10 days      RYLEY JonesPMounaH  PM&R  4/24/2020  10:34 AM

## 2020-04-24 NOTE — PROGRESS NOTES
Second Session Therapy Time:   Individual Concurrent Group Co-treatment   Time In 1330         Time Out 1400         Minutes 30           Timed Code Treatment Minutes:  86+30+95    Total Treatment Minutes:  ARNALDO Goldsmith (treatment and documentation for 1st and third sessions only)      Second Session Therapy Time:   Individual Concurrent Group Co-treatment   Time In 0915         Time Out 0945         Minutes [address], [place] [address]   (Performed 2nd session treatment and documentation only)

## 2020-04-25 PROCEDURE — 1280000000 HC REHAB R&B

## 2020-04-25 PROCEDURE — 97116 GAIT TRAINING THERAPY: CPT

## 2020-04-25 PROCEDURE — 97535 SELF CARE MNGMENT TRAINING: CPT

## 2020-04-25 PROCEDURE — 97530 THERAPEUTIC ACTIVITIES: CPT

## 2020-04-25 PROCEDURE — 97110 THERAPEUTIC EXERCISES: CPT

## 2020-04-25 PROCEDURE — 6370000000 HC RX 637 (ALT 250 FOR IP): Performed by: PHYSICAL MEDICINE & REHABILITATION

## 2020-04-25 RX ORDER — UREA 10 %
3 LOTION (ML) TOPICAL NIGHTLY PRN
Status: DISCONTINUED | OUTPATIENT
Start: 2020-04-25 | End: 2020-04-29 | Stop reason: HOSPADM

## 2020-04-25 RX ADMIN — SOTALOL HYDROCHLORIDE 120 MG: 80 TABLET ORAL at 08:20

## 2020-04-25 RX ADMIN — GABAPENTIN 100 MG: 100 CAPSULE ORAL at 14:04

## 2020-04-25 RX ADMIN — APIXABAN 5 MG: 5 TABLET, FILM COATED ORAL at 08:19

## 2020-04-25 RX ADMIN — BISACODYL 5 MG: 5 TABLET, COATED ORAL at 08:19

## 2020-04-25 RX ADMIN — PANTOPRAZOLE SODIUM 40 MG: 40 TABLET, DELAYED RELEASE ORAL at 06:22

## 2020-04-25 RX ADMIN — ACETAMINOPHEN 650 MG: 325 TABLET ORAL at 21:25

## 2020-04-25 RX ADMIN — GABAPENTIN 100 MG: 100 CAPSULE ORAL at 08:19

## 2020-04-25 RX ADMIN — GABAPENTIN 100 MG: 100 CAPSULE ORAL at 21:26

## 2020-04-25 RX ADMIN — TRAMADOL HYDROCHLORIDE 50 MG: 50 TABLET, FILM COATED ORAL at 21:26

## 2020-04-25 RX ADMIN — ACETAMINOPHEN 650 MG: 325 TABLET ORAL at 02:54

## 2020-04-25 RX ADMIN — ATORVASTATIN CALCIUM 40 MG: 40 TABLET, FILM COATED ORAL at 08:19

## 2020-04-25 RX ADMIN — APIXABAN 5 MG: 5 TABLET, FILM COATED ORAL at 21:26

## 2020-04-25 RX ADMIN — ACETAMINOPHEN 650 MG: 325 TABLET ORAL at 14:04

## 2020-04-25 RX ADMIN — Medication 3 MG: at 21:25

## 2020-04-25 RX ADMIN — ACETAMINOPHEN 650 MG: 325 TABLET ORAL at 08:20

## 2020-04-25 RX ADMIN — SOTALOL HYDROCHLORIDE 120 MG: 80 TABLET ORAL at 21:25

## 2020-04-25 ASSESSMENT — PAIN SCALES - GENERAL
PAINLEVEL_OUTOF10: 5
PAINLEVEL_OUTOF10: 5
PAINLEVEL_OUTOF10: 0
PAINLEVEL_OUTOF10: 4
PAINLEVEL_OUTOF10: 8

## 2020-04-25 ASSESSMENT — PAIN DESCRIPTION - ONSET
ONSET: ON-GOING

## 2020-04-25 ASSESSMENT — PAIN DESCRIPTION - FREQUENCY
FREQUENCY: INTERMITTENT
FREQUENCY: CONTINUOUS

## 2020-04-25 ASSESSMENT — PAIN DESCRIPTION - DESCRIPTORS
DESCRIPTORS: BURNING
DESCRIPTORS: BURNING
DESCRIPTORS: BURNING;SHARP

## 2020-04-25 ASSESSMENT — PAIN DESCRIPTION - PROGRESSION
CLINICAL_PROGRESSION: NOT CHANGED
CLINICAL_PROGRESSION: NOT CHANGED

## 2020-04-25 ASSESSMENT — PAIN DESCRIPTION - LOCATION
LOCATION: ABDOMEN

## 2020-04-25 ASSESSMENT — PAIN DESCRIPTION - PAIN TYPE
TYPE: SURGICAL PAIN

## 2020-04-25 ASSESSMENT — PAIN DESCRIPTION - ORIENTATION
ORIENTATION: MID
ORIENTATION: MID

## 2020-04-25 ASSESSMENT — PAIN - FUNCTIONAL ASSESSMENT: PAIN_FUNCTIONAL_ASSESSMENT: ACTIVITIES ARE NOT PREVENTED

## 2020-04-25 NOTE — PROGRESS NOTES
Department of Physical Medicine & Rehabilitation  Progress Note    Patient Identification:  Conchita Sebastian  [id number]  : 1960  Admit date: 2020    Chief Complaint: Cardiac debility     Subjective:   No acute events overnight. Patient seen this am. She has no new complaints. Would like melatonin added to med list.     ROS: No f/c, n/v, cp     Objective:  Patient Vitals for the past 24 hrs:   BP Temp Temp src Pulse Resp SpO2   20 0814 113/66 97.9 °F (36.6 °C) Oral 60 18 97 %   20 132/71 98.3 °F (36.8 °C) Oral 69 18 96 %   20 1122 -- -- -- -- -- 100 %     Const: Alert. No distress, pleasant. HEENT: Normocephalic, atraumatic. Normal sclera/conjunctiva. MMM. CV: Regular rate and rhythm. Resp: No respiratory distress. Lungs CTAB. Abd: Soft, nontender, nondistended, NABS+   Ext: No edema. Neuro: Alert, oriented, appropriately interactive. Psych: Cooperative, appropriate mood and affect    Laboratory data: Available via EMR. Last 24 hour lab  No results found for this or any previous visit (from the past 24 hour(s)).     Therapy progress:  PT  Required Braces or Orthoses  Left Lower Extremity Brace: Knee Immobilizer  Position Activity Restriction  Other position/activity restrictions: up as tolerated; KI (per previous therapy notes due to quad weakness)  Objective     Sit to Stand: Contact guard assistance(From recliner, bed, and mat table; VC for hand placement and upright posture.)  Stand to sit: Contact guard assistance(VC for L LE placement and hand positioning.)  Bed to Chair: Contact guard assistance(with rW)  Device: Rolling Walker  Other Apparatus: Knee Immobilizer, Left  Assistance: Contact guard assistance  Distance: 400', 80' and short distances in the gym  OT  PT Equipment Recommendations  Equipment Needed: No  Toilet - Technique: Ambulating  Equipment Used: Standard toilet(+ grab bar )  Toilet Transfers Comments: Pt stating that toilet in room is lower than toilet at home, reliance on grab bar for transfer   Assessment                  Body mass index is 27.22 kg/m². Assessment and Plan:  LLE weakness  -Likely related to surgery- L quad strength is diminished with knee buckling at unexpected times. She is working with therapy with an AMPAC score of 17/24.  PT/OT in the ARU.     Atrial fibrillation with RVR, possibly paroxysmal  - Continue Metoprolol 50 mg bid, digoxin  -CHADs-VACs score of 4. continue Eliquis  - Cardiology consulted  - NSR with Sotalol      Hypomagnesemia  -monitor and replace     Coronary disease status post stents  -Continue aspirin, statin     Hypertension  -Continue monitor blood pressure     Ovarian cancer status post laparoscopic resection of the pelvic mass, complete hysterectomy and bilateral salpingo-oophorectomy  -Management as per oncology     Rehab Progress: Improving  Anticipated Dispo: home  Services/DME: TBD  ELOS: 10 days      Alfredo Estrada D.O. M.P.H  PM&R  4/25/2020  10:40 AM

## 2020-04-25 NOTE — PROGRESS NOTES
Occupational Therapy  Facility/Department: [facility] ACUTE REHAB UNIT  Daily Treatment Note  NAME: Zac Rubio  : 1960  MRN: [id number]    Date of Service: 2020    Discharge Recommendations:  Home with Home health OT, Home with assist PRN  OT Equipment Recommendations  Other: Will continue to assess pending progress with therapy- pt currently owns AE for husbands use    Assessment   Performance deficits / Impairments: Decreased high-level IADLs;Decreased functional mobility ; Decreased endurance;Decreased ADL status; Decreased strength;Decreased balance;Decreased posture;Decreased safe awareness  Assessment: Pt tolerated therapy session well this date and stating feeling more confident with transfers and functional mobility with KI. Pt demonstrated fair+ dynamic standing balance with BUE supported at RW. Pt highly motivated to return home to OF. Cont OT POC to maximize functional independence. Treatment Diagnosis: Impaired ADLs, balance/strength, and mobility  Prognosis: Good  OT Education: Plan of Care;Transfer Training;Precautions  Patient Education: toilet transfer when doffing KI for LB clothing mgmt, tub transfer to simulate home environment-pt demo and verb understanding  REQUIRES OT FOLLOW UP: Yes  Activity Tolerance  Activity Tolerance: Patient Tolerated treatment well  Safety Devices  Safety Devices in place: Yes  Type of devices: All fall risk precautions in place;Call light within reach; Left in chair;Nurse notified; Chair alarm in place         Patient Diagnosis(es): There were no encounter diagnoses. has a past medical history of Cancer [facility]), Coronary artery disease, Heart disease, Hyperlipemia, Hypertension, and MI, old.   has a past surgical history that includes Tubal ligation; Cardiac surgery (, ); sinus surgery (); and Hysterectomy (Bilateral, 2020).     Restrictions  Restrictions/Precautions  Restrictions/Precautions: General Precautions, Fall Risk  Required Braces knee buckling-(pt stated 2 falls during inpatient stay related to turning in bathroom when L knee buckled)  Comment: KI donned  Functional Mobility  Functional - Mobility Device: Rolling Walker  Activity: To/from bathroom; To/From therapy gym  Toilet Transfers  Toilet - Technique: Ambulating  Equipment Used: Raised toilet seat with rails  Toilet Transfer: Stand by assistance  Tub Transfers  Tub - Transfer From: Rolling walker  Tub - Transfer Type: To and From  Tub - Transfer To: Transfer tub bench  Tub - Technique: Ambulating  Tub Transfers: Contact guard  Tub Transfers Comments: VCs for sequencing through transfer to simulate home environment as pt has shower chair inside tub at home and plans to install GBs-pt able to complete step in with R leg to sit on TTB and lift LLE into tub using KI; use of GB for support     Transfers  Sit to stand: Stand by assistance  Stand to sit: Stand by assistance(VCs for hand placement)                       Cognition  Overall Cognitive Status: [facility]       Second Session  Pt met seated in recliner chair and agreeable to OT treatment. Pt with KI on upon arrival and wore throughout session Pt completed functional mobility to and from bathroom with SBA and RW, completing transfer to and from raised toilet see with SBA and all toileting tasks with SBA(pants managment, alfred care completed seated). ADL grooming tasks oral care and combing hair completed in stance at sink with Supervision. Pt educated on energy conservation for home and OT goal by reading goal aloud to pt. Pt verbalized understanding. Functional mobility completed to and from kitchen for increasing standing and balance with simple cooking task of making coffee and wiping down tables. Pt completed activity with SBA standing for 5 min, 2 min and 3 min with good endurance. Pt left in recliner chair with alarm on and call light/needs in reach.             Plan  If pt discharges prior to next treatment, this note will serve as

## 2020-04-26 PROCEDURE — 1280000000 HC REHAB R&B

## 2020-04-26 PROCEDURE — 6370000000 HC RX 637 (ALT 250 FOR IP): Performed by: PHYSICAL MEDICINE & REHABILITATION

## 2020-04-26 RX ADMIN — APIXABAN 5 MG: 5 TABLET, FILM COATED ORAL at 08:16

## 2020-04-26 RX ADMIN — GABAPENTIN 100 MG: 100 CAPSULE ORAL at 13:20

## 2020-04-26 RX ADMIN — APIXABAN 5 MG: 5 TABLET, FILM COATED ORAL at 20:14

## 2020-04-26 RX ADMIN — Medication 3 MG: at 20:14

## 2020-04-26 RX ADMIN — SOTALOL HYDROCHLORIDE 120 MG: 80 TABLET ORAL at 08:17

## 2020-04-26 RX ADMIN — SOTALOL HYDROCHLORIDE 120 MG: 80 TABLET ORAL at 20:15

## 2020-04-26 RX ADMIN — ATORVASTATIN CALCIUM 40 MG: 40 TABLET, FILM COATED ORAL at 08:16

## 2020-04-26 RX ADMIN — GABAPENTIN 100 MG: 100 CAPSULE ORAL at 20:15

## 2020-04-26 RX ADMIN — ACETAMINOPHEN 650 MG: 325 TABLET ORAL at 20:15

## 2020-04-26 RX ADMIN — PANTOPRAZOLE SODIUM 40 MG: 40 TABLET, DELAYED RELEASE ORAL at 06:28

## 2020-04-26 RX ADMIN — ACETAMINOPHEN 650 MG: 325 TABLET ORAL at 08:16

## 2020-04-26 RX ADMIN — ACETAMINOPHEN 650 MG: 325 TABLET ORAL at 13:20

## 2020-04-26 RX ADMIN — TRAMADOL HYDROCHLORIDE 50 MG: 50 TABLET, FILM COATED ORAL at 10:51

## 2020-04-26 RX ADMIN — TRAMADOL HYDROCHLORIDE 50 MG: 50 TABLET, FILM COATED ORAL at 20:15

## 2020-04-26 RX ADMIN — GABAPENTIN 100 MG: 100 CAPSULE ORAL at 08:16

## 2020-04-26 ASSESSMENT — PAIN DESCRIPTION - DESCRIPTORS
DESCRIPTORS: BURNING
DESCRIPTORS: BURNING

## 2020-04-26 ASSESSMENT — PAIN DESCRIPTION - LOCATION
LOCATION: ABDOMEN
LOCATION: HEAD
LOCATION: ABDOMEN

## 2020-04-26 ASSESSMENT — PAIN DESCRIPTION - ORIENTATION
ORIENTATION: LEFT
ORIENTATION: MID
ORIENTATION: MID

## 2020-04-26 ASSESSMENT — PAIN DESCRIPTION - FREQUENCY
FREQUENCY: INTERMITTENT
FREQUENCY: CONTINUOUS
FREQUENCY: CONTINUOUS

## 2020-04-26 ASSESSMENT — PAIN DESCRIPTION - PAIN TYPE
TYPE: SURGICAL PAIN
TYPE: SURGICAL PAIN
TYPE: ACUTE PAIN

## 2020-04-26 ASSESSMENT — PAIN SCALES - GENERAL
PAINLEVEL_OUTOF10: 3
PAINLEVEL_OUTOF10: 0
PAINLEVEL_OUTOF10: 8
PAINLEVEL_OUTOF10: 5
PAINLEVEL_OUTOF10: 3
PAINLEVEL_OUTOF10: 3
PAINLEVEL_OUTOF10: 4
PAINLEVEL_OUTOF10: 5

## 2020-04-26 ASSESSMENT — PAIN DESCRIPTION - PROGRESSION
CLINICAL_PROGRESSION: GRADUALLY IMPROVING
CLINICAL_PROGRESSION: NOT CHANGED
CLINICAL_PROGRESSION: NOT CHANGED

## 2020-04-26 ASSESSMENT — PAIN DESCRIPTION - ONSET
ONSET: ON-GOING

## 2020-04-26 ASSESSMENT — PAIN - FUNCTIONAL ASSESSMENT
PAIN_FUNCTIONAL_ASSESSMENT: ACTIVITIES ARE NOT PREVENTED
PAIN_FUNCTIONAL_ASSESSMENT: ACTIVITIES ARE NOT PREVENTED
PAIN_FUNCTIONAL_ASSESSMENT: PREVENTS OR INTERFERES SOME ACTIVE ACTIVITIES AND ADLS

## 2020-04-26 ASSESSMENT — PAIN DESCRIPTION - DIRECTION: RADIATING_TOWARDS: NECK

## 2020-04-26 NOTE — PLAN OF CARE
Pt educated to use her call light when she needs assistance. Bed alarm on when Pt in bed and chair alarm on when Pt up in chair. Non skid socks on and call light placed within reach. RN will continue to monitor Pt.

## 2020-04-26 NOTE — PROGRESS NOTES
Pt walked up and down both hallways with a walker. RN accompanied Pt and provided stand by assistance by placing one hand lightly on her gait belt. Pt tolerated well. RN will continue to monitor Pt.

## 2020-04-27 LAB
ANION GAP SERPL CALCULATED.3IONS-SCNC: 11 MMOL/L (ref 3–16)
BASOPHILS ABSOLUTE: 0 K/UL (ref 0–0.2)
BASOPHILS RELATIVE PERCENT: 0.5 %
BUN BLDV-MCNC: 14 MG/DL (ref 7–20)
CALCIUM SERPL-MCNC: 9.2 MG/DL (ref 8.3–10.6)
CHLORIDE BLD-SCNC: 100 MMOL/L (ref 99–110)
CO2: 26 MMOL/L (ref 21–32)
CREAT SERPL-MCNC: 0.6 MG/DL (ref 0.6–1.1)
EOSINOPHILS ABSOLUTE: 1.9 K/UL (ref 0–0.6)
EOSINOPHILS RELATIVE PERCENT: 22.4 %
GFR AFRICAN AMERICAN: >60
GFR NON-AFRICAN AMERICAN: >60
GLUCOSE BLD-MCNC: 99 MG/DL (ref 70–99)
HCT VFR BLD CALC: 34.1 % (ref 36–48)
HEMOGLOBIN: 11 G/DL (ref 12–16)
LYMPHOCYTES ABSOLUTE: 1.7 K/UL (ref 1–5.1)
LYMPHOCYTES RELATIVE PERCENT: 21 %
MCH RBC QN AUTO: 28.5 PG (ref 26–34)
MCHC RBC AUTO-ENTMCNC: 32.2 G/DL (ref 31–36)
MCV RBC AUTO: 88.4 FL (ref 80–100)
MONOCYTES ABSOLUTE: 0.6 K/UL (ref 0–1.3)
MONOCYTES RELATIVE PERCENT: 7.6 %
NEUTROPHILS ABSOLUTE: 4 K/UL (ref 1.7–7.7)
NEUTROPHILS RELATIVE PERCENT: 48.5 %
PDW BLD-RTO: 14.3 % (ref 12.4–15.4)
PLATELET # BLD: 438 K/UL (ref 135–450)
PMV BLD AUTO: 8.6 FL (ref 5–10.5)
POTASSIUM REFLEX MAGNESIUM: 4.6 MMOL/L (ref 3.5–5.1)
RBC # BLD: 3.86 M/UL (ref 4–5.2)
SODIUM BLD-SCNC: 137 MMOL/L (ref 136–145)
WBC # BLD: 8.3 K/UL (ref 4–11)

## 2020-04-27 PROCEDURE — 6370000000 HC RX 637 (ALT 250 FOR IP): Performed by: PHYSICAL MEDICINE & REHABILITATION

## 2020-04-27 PROCEDURE — 97110 THERAPEUTIC EXERCISES: CPT

## 2020-04-27 PROCEDURE — 97112 NEUROMUSCULAR REEDUCATION: CPT

## 2020-04-27 PROCEDURE — 97535 SELF CARE MNGMENT TRAINING: CPT

## 2020-04-27 PROCEDURE — 97530 THERAPEUTIC ACTIVITIES: CPT

## 2020-04-27 PROCEDURE — 97116 GAIT TRAINING THERAPY: CPT

## 2020-04-27 PROCEDURE — 1280000000 HC REHAB R&B

## 2020-04-27 PROCEDURE — 36415 COLL VENOUS BLD VENIPUNCTURE: CPT

## 2020-04-27 PROCEDURE — 85025 COMPLETE CBC W/AUTO DIFF WBC: CPT

## 2020-04-27 PROCEDURE — 80048 BASIC METABOLIC PNL TOTAL CA: CPT

## 2020-04-27 RX ADMIN — TRAMADOL HYDROCHLORIDE 50 MG: 50 TABLET, FILM COATED ORAL at 21:15

## 2020-04-27 RX ADMIN — ACETAMINOPHEN 650 MG: 325 TABLET ORAL at 06:19

## 2020-04-27 RX ADMIN — APIXABAN 5 MG: 5 TABLET, FILM COATED ORAL at 08:26

## 2020-04-27 RX ADMIN — GABAPENTIN 100 MG: 100 CAPSULE ORAL at 21:15

## 2020-04-27 RX ADMIN — ACETAMINOPHEN 650 MG: 325 TABLET ORAL at 11:24

## 2020-04-27 RX ADMIN — PANTOPRAZOLE SODIUM 40 MG: 40 TABLET, DELAYED RELEASE ORAL at 06:19

## 2020-04-27 RX ADMIN — SOTALOL HYDROCHLORIDE 120 MG: 80 TABLET ORAL at 21:16

## 2020-04-27 RX ADMIN — ACETAMINOPHEN 650 MG: 325 TABLET ORAL at 15:28

## 2020-04-27 RX ADMIN — APIXABAN 5 MG: 5 TABLET, FILM COATED ORAL at 21:15

## 2020-04-27 RX ADMIN — GABAPENTIN 100 MG: 100 CAPSULE ORAL at 13:31

## 2020-04-27 RX ADMIN — ACETAMINOPHEN 650 MG: 325 TABLET ORAL at 21:16

## 2020-04-27 RX ADMIN — GABAPENTIN 100 MG: 100 CAPSULE ORAL at 08:26

## 2020-04-27 RX ADMIN — ATORVASTATIN CALCIUM 40 MG: 40 TABLET, FILM COATED ORAL at 08:26

## 2020-04-27 RX ADMIN — SOTALOL HYDROCHLORIDE 120 MG: 80 TABLET ORAL at 08:27

## 2020-04-27 ASSESSMENT — PAIN SCALES - GENERAL
PAINLEVEL_OUTOF10: 6
PAINLEVEL_OUTOF10: 3
PAINLEVEL_OUTOF10: 7
PAINLEVEL_OUTOF10: 2
PAINLEVEL_OUTOF10: 3
PAINLEVEL_OUTOF10: 3
PAINLEVEL_OUTOF10: 1
PAINLEVEL_OUTOF10: 1
PAINLEVEL_OUTOF10: 0

## 2020-04-27 ASSESSMENT — PAIN DESCRIPTION - ONSET
ONSET: ON-GOING

## 2020-04-27 ASSESSMENT — PAIN DESCRIPTION - DESCRIPTORS
DESCRIPTORS: ACHING
DESCRIPTORS: SORE
DESCRIPTORS: ACHING;SORE
DESCRIPTORS: BURNING

## 2020-04-27 ASSESSMENT — PAIN DESCRIPTION - LOCATION
LOCATION: ABDOMEN
LOCATION: ABDOMEN
LOCATION: HEAD
LOCATION: ABDOMEN

## 2020-04-27 ASSESSMENT — PAIN DESCRIPTION - PAIN TYPE
TYPE: SURGICAL PAIN
TYPE: ACUTE PAIN
TYPE: SURGICAL PAIN
TYPE: SURGICAL PAIN

## 2020-04-27 ASSESSMENT — PAIN DESCRIPTION - FREQUENCY
FREQUENCY: INTERMITTENT
FREQUENCY: CONTINUOUS
FREQUENCY: INTERMITTENT
FREQUENCY: INTERMITTENT

## 2020-04-27 ASSESSMENT — PAIN DESCRIPTION - ORIENTATION
ORIENTATION: MID
ORIENTATION: LEFT
ORIENTATION: MID
ORIENTATION: MID

## 2020-04-27 ASSESSMENT — PAIN - FUNCTIONAL ASSESSMENT
PAIN_FUNCTIONAL_ASSESSMENT: ACTIVITIES ARE NOT PREVENTED

## 2020-04-27 NOTE — PATIENT CARE CONFERENCE
Independent  Chair/Bed-to-Chair Transfer  Assistance Needed: Independent  CARE Score: 6  Discharge Goal: Independent  Car Transfer  Reason if not Attempted: Not attempted due to environmental limitations  CARE Score: 10  Discharge Goal: Independent  Walk 10 Feet  Assistance Needed: Supervision or touching assistance  Physical Assistance Level: No physical assistance  CARE Score: 4  Discharge Goal: Independent  Walk 50 Feet with Two Turns  Assistance Needed: Supervision or touching assistance  Physical Assistance Level: No physical assistance  CARE Score: 4  Discharge Goal: Independent  Walk 150 Feet  Assistance Needed: Supervision or touching assistance  Physical Assistance Level: No physical assistance  CARE Score: 4  Discharge Goal: Independent  Walking 10 Feet on Uneven Surfaces  Assistance Needed: Supervision or touching assistance  Physical Assistance Level: Less than 25%  CARE Score: 4  Discharge Goal: Independent  1 Step (Curb)  Assistance Needed: Partial/moderate assistance  CARE Score: 3  Discharge Goal: Supervision or touching assistance  4 Steps  Assistance Needed: Partial/moderate assistance  CARE Score: 3  Discharge Goal: Supervision or touching assistance  12 Steps  Reason if not Attempted: Not attempted due to medical condition or safety concerns  CARE Score: 88  Discharge Goal: Supervision or touching assistance  Picking Up Object  Reason if not Attempted: Not attempted due to medical condition or safety concerns  CARE Score: 88  Discharge Goal: Supervision or touching assistance  Wheelchair Ability  Uses a Wheelchair and/or Scooter?: No    SPEECH THERAPY:  Diet Level:DIET CARDIAC;    OCCUPATIONAL THERAPY:  ADL  Feeding: Independent  Grooming: Modified independent (oral hygiene in stance at sink)  UE Bathing: Modified independent (seated on TTB)  LE Bathing: Supervision(seated on TTB; figure 4 to wash rina feet; in stance with steady assist and VCs to maintain unilateral UE support at GB during weakness    Interdisciplinary Individualized Plan of Care Review:    · Continue Current Plan of Care: Yes    · Modifications:_____________________________     Special Needs in the Upcoming Week:    [x] Family/Caregiver Education  [] Home visit  []Therapeutic Pass   [] Consults:_______   [] Family Training  [] Other;_______     Patient Goals for Rehab stay:  1. Return home and get her L leg moving again  2. Regain independence with ADL routine     Rehab Team Goals for patient for the Upcoming Week:  1. Increase independence with ambulation  2. Increase strength to L quad   3. Increase independence with ADLs    Rehab Team Members in attendance for Team Conference:  :  Edilberto Corea RN    Therapy Manager:  Laine Mohr, PT, DPT    Social Work:  Elvira Lemon, MSW, LSW    Nursing:  Rita Grier, BSN, RN  Hans Martinez RN    Therapy:  Ash Arredondo, PT  Lucia Campos, PT, DPT  Miachel Schirmer, PT, DPT    Frank Govea, OTR/L  Hoang Posada, OTR/L  Good, OTR/L    Donnie Mitchell MA/CCC-SLP    Nutrition:  Lata Stanley RD LD    I approve the established interdisciplinary plan of care as documented within the medical record of Odalys Javier.     MD Kylah Cuevas D.O. M.P.H  PM&R  4/29/2020  10:27 AM

## 2020-04-27 NOTE — PROGRESS NOTES
Department of Physical Medicine & Rehabilitation  Progress Note    Patient Identification:  Montez Buerger  [id number]  : 1960  Admit date: 2020    Chief Complaint: Cardiac debility     Subjective:   No new complaints overnight. Improving in therapy. Likely discharge in next 48 hours. ROS: No f/c, n/v, cp     Objective:  Patient Vitals for the past 24 hrs:   BP Temp Temp src Pulse Resp SpO2 Weight   20 0734 (!) 105/56 98.3 °F (36.8 °C) Oral 63 20 97 % --   20 0619 -- -- -- -- -- -- 180 lb 8.9 oz (81.9 kg)   20 (!) 107/58 98 °F (36.7 °C) Oral 63 18 97 % --   20 1353 101/60 -- -- 57 -- -- --     Const: Alert. No distress, pleasant. HEENT: Normocephalic, atraumatic. Normal sclera/conjunctiva. MMM. CV: Regular rate and rhythm. Resp: No respiratory distress. Lungs CTAB. Abd: Soft, nontender, nondistended, NABS+   Ext: No edema. Neuro: Alert, oriented, appropriately interactive. Psych: Cooperative, appropriate mood and affect    Laboratory data: Available via EMR.    Last 24 hour lab  Recent Results (from the past 24 hour(s))   Basic Metabolic Panel w/ Reflex to MG    Collection Time: 20  5:48 AM   Result Value Ref Range    Sodium 137 136 - 145 mmol/L    Potassium reflex Magnesium 4.6 3.5 - 5.1 mmol/L    Chloride 100 99 - 110 mmol/L    CO2 26 21 - 32 mmol/L    Anion Gap 11 3 - 16    Glucose 99 70 - 99 mg/dL    BUN 14 7 - 20 mg/dL    CREATININE 0.6 0.6 - 1.1 mg/dL    GFR Non-African American >60 >60    GFR African American >60 >60    Calcium 9.2 8.3 - 10.6 mg/dL   CBC auto differential    Collection Time: 20  5:48 AM   Result Value Ref Range    WBC 8.3 4.0 - 11.0 K/uL    RBC 3.86 (L) 4.00 - 5.20 M/uL    Hemoglobin 11.0 (L) 12.0 - 16.0 g/dL    Hematocrit 34.1 (L) 36.0 - 48.0 %    MCV 88.4 80.0 - 100.0 fL    MCH 28.5 26.0 - 34.0 pg    MCHC 32.2 31.0 - 36.0 g/dL    RDW 14.3 12.4 - 15.4 %    Platelets 109 510 - 171 K/uL    MPV 8.6 5.0 - 10.5 fL

## 2020-04-27 NOTE — PROGRESS NOTES
4/8/2020). Restrictions  Restrictions/Precautions  Restrictions/Precautions: General Precautions, Fall Risk  Required Braces or Orthoses?: Yes  Required Braces or Orthoses  Left Lower Extremity Brace: Knee Immobilizer  Position Activity Restriction  Other position/activity restrictions: up as tolerated; KI (per previous therapy notes due to quad weakness)     Subjective   General  Chart Reviewed: Yes  Patient assessed for rehabilitation services?: Yes  Additional Pertinent Hx: Pt is s/p laparoscopic resection of the pelvic mass, complete hysterectomy and bilateral salpingo-oophorectomy on 04/08/2020 2* ovarian CA; developed quad/L LE weakness after surgery; transferred to ARU; transfer to  4/16 2* tachycardia and a-fib; PMHx: HTN, HLD, CAD, ovarian CA, MI, heart disease  Family / Caregiver Present: No  Referring Practitioner: DO Susannah   Diagnosis: a-fib w/ RVR  Subjective  Subjective: Pt seated in recliner upon therapist arrival, pleasant and agreeable to OT session requesting shower.   Vital Signs  Patient Currently in Pain: Denies     Orientation  Orientation  Overall Orientation Status: Within Functional Limits  Objective    ADL  Grooming: Modified independent (oral hygiene in stance at sink; brushing hair seated on TTB)  UE Bathing: Modified independent (seated on TTB)  LE Bathing: Supervision(seated on TTB; figure 4 to wash rina feet; in stance with steady assist and VCs to maintain unilateral UE support at GB during pericare hygiene)  UE Dressing: Independent(to don bra and shirt seated on TTB)  LE Dressing: Supervision(to don underwear, pants, socks, KI and shoes while seated on TTB; in stance to manage over hips with KI donned)  Toileting: Modified independent (pt continent of bladder; managed LB clothing in stance with KI donned)  Additional Comments: Pt demo proper donning and doffing of KI        Balance  Sitting Balance: Independent(seated on TTB)  Standing Balance: Stand by assistance(with

## 2020-04-27 NOTE — PLAN OF CARE
No falls. Left knee did buckle with therapy. Gait belt and walker utilized with ambulation. Continue PT/OT. Abdominal lap sites and midline incision free of drainage. And are well approximated. Right groin bruised and semi soft hematoma distal to site which is tender. On eloquis po. VSS. Vitals:    04/27/20 0734   BP: (!) 105/56   Pulse: 63   Resp: 20   Temp: 98.3 °F (36.8 °C)   SpO2:      Pulse ox 97% on room air. .The current medical regimen is effective;  continue present plan and medications.

## 2020-04-27 NOTE — PROGRESS NOTES
bilateral salpingo-oophorectomy on 04/08/2020 2* ovarian CA; developed quad/L LE weakness after surgery; transferred to ARU; transfer to  4/16 2* tachycardia and a-fib; PMHx: HTN, HLD, CAD, ovarian CA, MI, heart disease  Family / Caregiver Present: No  Referring Practitioner: Dr. Hernandez Peer: Pt states that she was able to take a walk with the RN yesterday and she felt good walking. General Comment  Comments: Pt resting in bedside chair upon PT arrival. Pt agreeable to PT intervention. Pain Screening  Patient Currently in Pain: Denies(States that she took some tylenol earlier.)  Vital Signs  Patient Currently in Pain: Denies(States that she took some tylenol earlier.)       Orientation     Cognition      Objective      Transfers  Sit to Stand: Contact guard assistance(From recliner, chair, and mat table; VC for hand placement and upright posture.)  Stand to sit: Contact guard assistance(VC for L LE placement and hand positioning.)  Ambulation  Ambulation?: Yes  Ambulation 1  Device: Rolling Walker  Other Apparatus: Knee Immobilizer; Left  Assistance: Contact guard assistance  Quality of Gait: Reciprocal pattern with fwd flexed posture. 1 episode of knee buckling requiring MOD A to maintain standing. Distance: 400', [de-identified]' and short distances in the gym  Comments: VC for upright posture with fwd gaze, improved positioning in the RW, and insuring proper placement of L LE to prevent knee buckling. Stairs/Curb  Stairs?: Yes  Stairs  # Steps : 12  Stairs Height: 6\"  Rails: Right ascending(with B UE)  Assistance: Contact guard assistance  Comment: Pt ascends/descends stairs with a step to pattern. No LOB and/or knee buckling noted. VCs for sequencing pattern. Comment: Pt completed 3 x 5 minutes on the seated stepper with B LEs only for improved functional strength and endurance. 2nd session: Pt was sitting in the bedside chair upon arrival. Pt agreeable to PT intervention. Per discussion with the MD trial of ESTIM to the L quad was completed. 20 minutes of Croatian ESTIM with duty cycle 50%, burst frequency 60bps, cycle time 10/20 and a ramp of 2 sec. During the \"on\" phase pt completed B simultaneous SAQ, but still needing AA to complete. PT continues to have only a trace strength for her L quad. Pt completed 2 x 15 reps of B bridges and  B figure 4 bridges for improved functional strength. Pt completed 10 reps of \"lift offs\" from the mat table with use of UEs to lift, but taken away when instructed to hold. This exercise was used to attempt increase weight bearing on the L LE to elicit increased quad contraction, but still remained only a trace contraction. Pt completed supine <> sit independently on the mat table. Pt completed multiple reps of sit <> stand transfers from recliner and mat table with SBA to the RW. Pt ambulate 2 x [de-identified]' with RW and CGA (See above for gait deviations and interventions). Pt was sitting in the bedside chair at the end of the session with chair alarm on, call light and all needs within reach.       G-Code     OutComes Score                                                     AM-PAC Score             Goals  Short term goals  Time Frame for Short term goals: STG=LTG  Long term goals  Time Frame for Long term goals : 7-10 days   Long term goal 1: Pt will complete bed mobility independently--Ongoing  Long term goal 2: Pt will transfer sit <> stand MOD I--Ongoing  Long term goal 3: Pt will ambulate 300' with RW MOD I--Ongoing  Long term goal 4: Pt will ascend/descend 12 steps with 1 hand rail and supervision--Ongoing  Patient Goals   Patient goals : Return home ASAP and get my L leg moving better    Plan    Plan  Times per week: 5x/wk for 90 minutes/day  Current Treatment Recommendations: Transfer Training, Strengthening, ROM, Balance Training, Gait Training, Functional Mobility Training, Safety Education & Training, Patient/Caregiver Education & Training, Stair training, Equipment Evaluation, Education, & procurement  Safety Devices  Type of devices: Call light within reach, Chair alarm in place, Left in chair  Restraints  Initially in place: No     Therapy Time   Individual Concurrent Group Co-treatment   Time In 0730         Time Out 0820         Minutes 48              Second Session Therapy Time:   Individual Concurrent Group Co-treatment   Time In 1115         Time Out 1205         Minutes 50           Timed Code Treatment Minutes:  50+50    Total Treatment Minutes:  Kiran Barbour 26, PT

## 2020-04-28 PROCEDURE — 97530 THERAPEUTIC ACTIVITIES: CPT

## 2020-04-28 PROCEDURE — 97112 NEUROMUSCULAR REEDUCATION: CPT

## 2020-04-28 PROCEDURE — 6370000000 HC RX 637 (ALT 250 FOR IP): Performed by: PHYSICAL MEDICINE & REHABILITATION

## 2020-04-28 PROCEDURE — 97110 THERAPEUTIC EXERCISES: CPT

## 2020-04-28 PROCEDURE — 97535 SELF CARE MNGMENT TRAINING: CPT

## 2020-04-28 PROCEDURE — 97116 GAIT TRAINING THERAPY: CPT

## 2020-04-28 PROCEDURE — 1280000000 HC REHAB R&B

## 2020-04-28 RX ORDER — UREA 10 %
3 LOTION (ML) TOPICAL NIGHTLY PRN
Qty: 30 TABLET | Refills: 1 | Status: SHIPPED | OUTPATIENT
Start: 2020-04-28 | End: 2020-04-29

## 2020-04-28 RX ORDER — SOTALOL HYDROCHLORIDE 120 MG/1
120 TABLET ORAL 2 TIMES DAILY
Qty: 60 TABLET | Refills: 3 | Status: SHIPPED | OUTPATIENT
Start: 2020-04-28 | End: 2020-04-29

## 2020-04-28 RX ORDER — NITROGLYCERIN 0.4 MG/1
TABLET SUBLINGUAL
Qty: 25 TABLET | Refills: 3 | Status: SHIPPED | OUTPATIENT
Start: 2020-04-28 | End: 2020-04-29

## 2020-04-28 RX ORDER — TRAMADOL HYDROCHLORIDE 50 MG/1
50 TABLET ORAL EVERY 8 HOURS PRN
Qty: 20 TABLET | Refills: 0 | Status: SHIPPED | OUTPATIENT
Start: 2020-04-28 | End: 2020-05-05

## 2020-04-28 RX ORDER — PANTOPRAZOLE SODIUM 40 MG/1
40 TABLET, DELAYED RELEASE ORAL
Qty: 30 TABLET | Refills: 3 | Status: SHIPPED | OUTPATIENT
Start: 2020-04-29 | End: 2020-04-29

## 2020-04-28 RX ADMIN — ATORVASTATIN CALCIUM 40 MG: 40 TABLET, FILM COATED ORAL at 08:36

## 2020-04-28 RX ADMIN — BISACODYL 5 MG: 5 TABLET, COATED ORAL at 08:36

## 2020-04-28 RX ADMIN — APIXABAN 5 MG: 5 TABLET, FILM COATED ORAL at 21:04

## 2020-04-28 RX ADMIN — ACETAMINOPHEN 650 MG: 325 TABLET ORAL at 16:22

## 2020-04-28 RX ADMIN — ACETAMINOPHEN 650 MG: 325 TABLET ORAL at 08:36

## 2020-04-28 RX ADMIN — PANTOPRAZOLE SODIUM 40 MG: 40 TABLET, DELAYED RELEASE ORAL at 06:24

## 2020-04-28 RX ADMIN — SOTALOL HYDROCHLORIDE 120 MG: 80 TABLET ORAL at 08:36

## 2020-04-28 RX ADMIN — APIXABAN 5 MG: 5 TABLET, FILM COATED ORAL at 08:36

## 2020-04-28 RX ADMIN — ACETAMINOPHEN 650 MG: 325 TABLET ORAL at 21:04

## 2020-04-28 RX ADMIN — GABAPENTIN 100 MG: 100 CAPSULE ORAL at 08:36

## 2020-04-28 RX ADMIN — ACETAMINOPHEN 650 MG: 325 TABLET ORAL at 11:40

## 2020-04-28 RX ADMIN — SOTALOL HYDROCHLORIDE 120 MG: 80 TABLET ORAL at 21:04

## 2020-04-28 RX ADMIN — TRAMADOL HYDROCHLORIDE 50 MG: 50 TABLET, FILM COATED ORAL at 21:04

## 2020-04-28 RX ADMIN — GABAPENTIN 100 MG: 100 CAPSULE ORAL at 21:04

## 2020-04-28 RX ADMIN — GABAPENTIN 100 MG: 100 CAPSULE ORAL at 16:22

## 2020-04-28 RX ADMIN — ACETAMINOPHEN 650 MG: 325 TABLET ORAL at 02:54

## 2020-04-28 ASSESSMENT — PAIN DESCRIPTION - LOCATION
LOCATION: ABDOMEN
LOCATION: HEAD

## 2020-04-28 ASSESSMENT — PAIN DESCRIPTION - ONSET
ONSET: ON-GOING

## 2020-04-28 ASSESSMENT — PAIN DESCRIPTION - FREQUENCY
FREQUENCY: INTERMITTENT

## 2020-04-28 ASSESSMENT — PAIN DESCRIPTION - PAIN TYPE
TYPE: SURGICAL PAIN
TYPE: ACUTE PAIN

## 2020-04-28 ASSESSMENT — PAIN DESCRIPTION - DESCRIPTORS
DESCRIPTORS: SORE
DESCRIPTORS: SORE
DESCRIPTORS: DISCOMFORT
DESCRIPTORS: DISCOMFORT
DESCRIPTORS: HEADACHE

## 2020-04-28 ASSESSMENT — PAIN SCALES - GENERAL
PAINLEVEL_OUTOF10: 2
PAINLEVEL_OUTOF10: 4
PAINLEVEL_OUTOF10: 0
PAINLEVEL_OUTOF10: 3
PAINLEVEL_OUTOF10: 4
PAINLEVEL_OUTOF10: 0
PAINLEVEL_OUTOF10: 2
PAINLEVEL_OUTOF10: 4

## 2020-04-28 ASSESSMENT — PAIN DESCRIPTION - PROGRESSION
CLINICAL_PROGRESSION: GRADUALLY IMPROVING
CLINICAL_PROGRESSION: GRADUALLY WORSENING
CLINICAL_PROGRESSION: GRADUALLY IMPROVING
CLINICAL_PROGRESSION: GRADUALLY IMPROVING

## 2020-04-28 ASSESSMENT — PAIN DESCRIPTION - ORIENTATION
ORIENTATION: MID

## 2020-04-28 NOTE — PROGRESS NOTES
Pt in bed, awake. Nighttime medication given which included Tramadol for pain. Pt had no other needs at this time. Call light within reach. Safety measures in place.

## 2020-04-28 NOTE — PROGRESS NOTES
Physical Therapy  Facility/Department: [facility] ACUTE REHAB UNIT  Daily Treatment Note  NAME: Romelia Garcia  : 1960  MRN: [id number]    Date of Service: 2020    Discharge Recommendations:  Home with assist PRN, Home with Home health PT   PT Equipment Recommendations  Equipment Needed: No    Assessment   Body structures, Functions, Activity limitations: Decreased functional mobility ; Decreased balance;Decreased strength;Decreased sensation;Decreased posture  Assessment: Pt able to complete ambulation this session without an episode of knee buckling. Pt still with minimal quad contraction this session, but able to complete active knee ext in sidelying with the use of powder board. Pt at times utilizing substitutions to complete range despite VC. Cont with PT POC. Treatment Diagnosis: Decreased independence with functional mobility. Prognosis: Excellent  REQUIRES PT FOLLOW UP: Yes  Activity Tolerance  Activity Tolerance: Patient Tolerated treatment well     Patient Diagnosis(es): The encounter diagnosis was Ovarian cancer on left ([facility] [address] has a past medical history of Cancer [facility]), Coronary artery disease, Heart disease, Hyperlipemia, Hypertension, and MI, old.   has a past surgical history that includes Tubal ligation; Cardiac surgery (, ); sinus surgery (); and Hysterectomy (Bilateral, 2020). Restrictions  Restrictions/Precautions  Restrictions/Precautions: General Precautions, Fall Risk  Required Braces or Orthoses?: Yes  Required Braces or Orthoses  Left Lower Extremity Brace: Knee Immobilizer  Position Activity Restriction  Other position/activity restrictions: up as tolerated; KI (per previous therapy notes due to quad weakness)  Subjective   General  Chart Reviewed:  Yes  Additional Pertinent Hx: Pt is s/p laparoscopic resection of the pelvic mass, complete hysterectomy and bilateral salpingo-oophorectomy on 2020 2* ovarian CA; developed quad/L LE weakness after surgery;

## 2020-04-28 NOTE — PROGRESS NOTES
Department of Physical Medicine & Rehabilitation  Progress Note    Patient Identification:  Marquis Martines  [id number]  : 1960  Admit date: 2020    Chief Complaint: Cardiac debility     Subjective:   Feels well. Wants to discharge today. ROS: No f/c, n/v, cp     Objective:  Patient Vitals for the past 24 hrs:   BP Temp Temp src Pulse Resp SpO2   20 0830 109/71 97.8 °F (36.6 °C) Oral 64 18 96 %   20 1937 (!) 99/57 97.6 °F (36.4 °C) Oral 67 20 97 %   20 1530 103/62 -- -- 60 -- --     Const: Alert. No distress, pleasant. HEENT: Normocephalic, atraumatic. Normal sclera/conjunctiva. MMM. CV: Regular rate and rhythm. Resp: No respiratory distress. Lungs CTAB. Abd: Soft, nontender, nondistended, NABS+   Ext: No edema. Neuro: Alert, oriented, appropriately interactive. Psych: Cooperative, appropriate mood and affect    Laboratory data: Available via EMR. Last 24 hour lab  No results found for this or any previous visit (from the past 24 hour(s)).     Therapy progress:  PT  Required Braces or Orthoses  Left Lower Extremity Brace: Knee Immobilizer  Position Activity Restriction  Other position/activity restrictions: up as tolerated; KI (per previous therapy notes due to quad weakness)  Objective     Sit to Stand: Contact guard assistance(From recliner, chair, and mat table; VC for hand placement and upright posture.)  Stand to sit: Contact guard assistance(VC for L LE placement and hand positioning.)  Bed to Chair: Contact guard assistance(with rW)  Device: Rolling Walker  Other Apparatus: Knee Immobilizer, Left  Assistance: Contact guard assistance  Distance: 400', 80' and short distances in the gym  OT  PT Equipment Recommendations  Equipment Needed: No  Toilet - Technique: Ambulating  Equipment Used: Raised toilet seat with rails  Toilet Transfers Comments: Pt stating that toilet in room is lower than toilet at home, reliance on grab bar for transfer   Assessment

## 2020-04-28 NOTE — PROGRESS NOTES
Occupational Therapy  Facility/Department: [place] [address] [facility]  Daily Treatment Note/Discharge Note  NAME: Pipe Lala  : 1960  MRN: [id number]    Date of Service: 2020    Discharge Recommendations:  Home with Home health OT, Home with assist PRN  OT Equipment Recommendations  Equipment Needed: Yes  ADL Assistive Devices: Transfer Tub Bench    Assessment   Performance deficits / Impairments: Decreased high-level IADLs;Decreased functional mobility ; Decreased endurance;Decreased ADL status; Decreased strength;Decreased balance;Decreased posture;Decreased safe awareness  Assessment: Pt tolerated therapy session well progressing with dynamic standing balance and functional mobility. Pt met 4/5 LTGs but unable to meet LB dressing at mod I and d/t decreased L quad strength requiring spvn assist d/t tendency of L knee to buckle. Pt plans [facility]. Treatment Diagnosis: Impaired ADLs, balance/strength, and mobility  Prognosis: Good  OT Education: Plan of Care;Transfer Training;ADL Adaptive Strategies;Precautions; Equipment  Patient Education: Discharge planning, pt verb understanding  REQUIRES OT FOLLOW UP: Yes  Activity Tolerance  Activity Tolerance: Patient Tolerated treatment well  Safety Devices  Safety Devices in place: Yes  Type of devices: All fall risk precautions in place;Call light within reach; Left in chair;Chair alarm in place         Patient Diagnosis(es): The encounter diagnosis was Ovarian cancer on left [facility]). has a past medical history of Cancer [facility]), Coronary artery disease, Heart disease, Hyperlipemia, Hypertension, and MI, old.   has a past surgical history that includes Tubal ligation; Cardiac surgery (, ); sinus surgery (); and Hysterectomy (Bilateral, 2020).     Restrictions  Restrictions/Precautions  Restrictions/Precautions: General Precautions, Fall Risk  Required Braces or Orthoses?: Yes  Required Braces or Orthoses  Left Lower Extremity Brace: Knee Immobilizer  Position Activity Restriction  Other position/activity restrictions: up as tolerated; KI (per previous therapy notes due to quad weakness)     Subjective   General  Chart Reviewed: Yes  Patient assessed for rehabilitation services?: Yes  Additional Pertinent Hx: Pt is s/p laparoscopic resection of the pelvic mass, complete hysterectomy and bilateral salpingo-oophorectomy on 04/08/2020 2* ovarian CA; developed quad/L LE weakness after surgery; transferred to ARU; transfer to  4/16 2* tachycardia and a-fib; PMHx: HTN, HLD, CAD, ovarian CA, MI, heart disease  Family / Caregiver Present: No  Referring Practitioner: DO Susannah   Diagnosis: a-fib w/ RVR  Subjective  Subjective: Pt seated in recliner upon therapist arrival, pleasant and agreeable to OT session requesting to brush teeth. General Comment  Comments: Natasha Whitedler Vital Signs  Patient Currently in Pain: Denies(reported having tylenol recently for abdomen, did not rate any pain)     Orientation  Orientation  Overall Orientation Status: Within Functional Limits  Objective    ADL  Feeding: Independent  Grooming: Modified independent (oral hygiene in stance at sink)  Toileting: Modified independent (continent of bladder; pericare hygiene completed seated)  Instrumental ADL's  Instrumental ADLs: Yes  Meal Prep  Meal Prep Level: Walker  Meal Prep Level of Assistance: Supervision  Meal Preparation: Pt completed simple meal preparation task to make microwavable oatmeal with spvn overall (but requires CGA for ambulation using RW d/t R knee buckling) including item retrieval from overhead and at waist level. Pt demo no difficulty following written instructions to sequence task. Pt required VCs for proximity to RW. Pt demo no LOB or knee buckling throughout task. Pt tolerated ~10 minutes in stance for duration of cooking task.   Light Housekeeping  Light Housekeeping Level: [facility] [facility] Level of Assistance: Contact guard Timed Code Treatment Minutes:  60 + 30    Total Treatment Minutes:  [facility], OT

## 2020-04-29 VITALS
RESPIRATION RATE: 16 BRPM | OXYGEN SATURATION: 97 % | HEIGHT: 69 IN | DIASTOLIC BLOOD PRESSURE: 73 MMHG | TEMPERATURE: 97.6 F | HEART RATE: 62 BPM | BODY MASS INDEX: 26.74 KG/M2 | SYSTOLIC BLOOD PRESSURE: 116 MMHG | WEIGHT: 180.56 LBS

## 2020-04-29 LAB
ANION GAP SERPL CALCULATED.3IONS-SCNC: 11 MMOL/L (ref 3–16)
BASOPHILS ABSOLUTE: 0.1 K/UL (ref 0–0.2)
BASOPHILS RELATIVE PERCENT: 0.6 %
BUN BLDV-MCNC: 15 MG/DL (ref 7–20)
CALCIUM SERPL-MCNC: 9.6 MG/DL (ref 8.3–10.6)
CHLORIDE BLD-SCNC: 101 MMOL/L (ref 99–110)
CO2: 25 MMOL/L (ref 21–32)
CREAT SERPL-MCNC: 0.7 MG/DL (ref 0.6–1.1)
EOSINOPHILS ABSOLUTE: 1.9 K/UL (ref 0–0.6)
EOSINOPHILS RELATIVE PERCENT: 20.1 %
GFR AFRICAN AMERICAN: >60
GFR NON-AFRICAN AMERICAN: >60
GLUCOSE BLD-MCNC: 97 MG/DL (ref 70–99)
HCT VFR BLD CALC: 35.5 % (ref 36–48)
HEMOGLOBIN: 11.7 G/DL (ref 12–16)
LYMPHOCYTES ABSOLUTE: 2.5 K/UL (ref 1–5.1)
LYMPHOCYTES RELATIVE PERCENT: 25.3 %
MCH RBC QN AUTO: 28.9 PG (ref 26–34)
MCHC RBC AUTO-ENTMCNC: 33 G/DL (ref 31–36)
MCV RBC AUTO: 87.6 FL (ref 80–100)
MONOCYTES ABSOLUTE: 0.7 K/UL (ref 0–1.3)
MONOCYTES RELATIVE PERCENT: 7.1 %
NEUTROPHILS ABSOLUTE: 4.5 K/UL (ref 1.7–7.7)
NEUTROPHILS RELATIVE PERCENT: 46.9 %
PDW BLD-RTO: 14.4 % (ref 12.4–15.4)
PLATELET # BLD: 452 K/UL (ref 135–450)
PMV BLD AUTO: 8.5 FL (ref 5–10.5)
POTASSIUM REFLEX MAGNESIUM: 4.5 MMOL/L (ref 3.5–5.1)
RBC # BLD: 4.05 M/UL (ref 4–5.2)
SODIUM BLD-SCNC: 137 MMOL/L (ref 136–145)
WBC # BLD: 9.7 K/UL (ref 4–11)

## 2020-04-29 PROCEDURE — 6370000000 HC RX 637 (ALT 250 FOR IP): Performed by: PHYSICAL MEDICINE & REHABILITATION

## 2020-04-29 PROCEDURE — 80048 BASIC METABOLIC PNL TOTAL CA: CPT

## 2020-04-29 PROCEDURE — 36415 COLL VENOUS BLD VENIPUNCTURE: CPT

## 2020-04-29 PROCEDURE — 97530 THERAPEUTIC ACTIVITIES: CPT

## 2020-04-29 PROCEDURE — 85025 COMPLETE CBC W/AUTO DIFF WBC: CPT

## 2020-04-29 RX ORDER — UREA 10 %
3 LOTION (ML) TOPICAL NIGHTLY PRN
Qty: 30 TABLET | Refills: 1 | Status: SHIPPED | OUTPATIENT
Start: 2020-04-29 | End: 2022-06-17

## 2020-04-29 RX ORDER — ATORVASTATIN CALCIUM 40 MG/1
40 TABLET, FILM COATED ORAL DAILY
Qty: 30 TABLET | Refills: 3 | Status: SHIPPED | OUTPATIENT
Start: 2020-04-30 | End: 2021-12-27 | Stop reason: ALTCHOICE

## 2020-04-29 RX ORDER — PANTOPRAZOLE SODIUM 40 MG/1
40 TABLET, DELAYED RELEASE ORAL
Qty: 30 TABLET | Refills: 3 | Status: SHIPPED | OUTPATIENT
Start: 2020-04-29 | End: 2020-07-15

## 2020-04-29 RX ORDER — SOTALOL HYDROCHLORIDE 120 MG/1
120 TABLET ORAL 2 TIMES DAILY
Qty: 60 TABLET | Refills: 2 | Status: SHIPPED | OUTPATIENT
Start: 2020-04-29 | End: 2020-07-15 | Stop reason: SDUPTHER

## 2020-04-29 RX ORDER — NITROGLYCERIN 0.4 MG/1
TABLET SUBLINGUAL
Qty: 25 TABLET | Refills: 3 | Status: SHIPPED | OUTPATIENT
Start: 2020-04-29

## 2020-04-29 RX ORDER — GABAPENTIN 100 MG/1
100 CAPSULE ORAL 3 TIMES DAILY
Qty: 90 CAPSULE | Refills: 2 | Status: SHIPPED | OUTPATIENT
Start: 2020-04-29 | End: 2022-06-23

## 2020-04-29 RX ADMIN — APIXABAN 5 MG: 5 TABLET, FILM COATED ORAL at 08:51

## 2020-04-29 RX ADMIN — PANTOPRAZOLE SODIUM 40 MG: 40 TABLET, DELAYED RELEASE ORAL at 06:22

## 2020-04-29 RX ADMIN — SOTALOL HYDROCHLORIDE 120 MG: 80 TABLET ORAL at 08:51

## 2020-04-29 RX ADMIN — GABAPENTIN 100 MG: 100 CAPSULE ORAL at 08:51

## 2020-04-29 RX ADMIN — ACETAMINOPHEN 650 MG: 325 TABLET ORAL at 06:22

## 2020-04-29 RX ADMIN — ATORVASTATIN CALCIUM 40 MG: 40 TABLET, FILM COATED ORAL at 08:51

## 2020-04-29 ASSESSMENT — PAIN SCALES - GENERAL
PAINLEVEL_OUTOF10: 0
PAINLEVEL_OUTOF10: 1
PAINLEVEL_OUTOF10: 2

## 2020-04-29 ASSESSMENT — PAIN DESCRIPTION - LOCATION
LOCATION: ABDOMEN
LOCATION: ABDOMEN

## 2020-04-29 ASSESSMENT — PAIN DESCRIPTION - DESCRIPTORS
DESCRIPTORS: DISCOMFORT
DESCRIPTORS: DISCOMFORT

## 2020-04-29 ASSESSMENT — PAIN DESCRIPTION - ONSET
ONSET: ON-GOING
ONSET: ON-GOING

## 2020-04-29 ASSESSMENT — PAIN DESCRIPTION - ORIENTATION
ORIENTATION: MID
ORIENTATION: MID

## 2020-04-29 ASSESSMENT — PAIN DESCRIPTION - FREQUENCY
FREQUENCY: INTERMITTENT
FREQUENCY: INTERMITTENT

## 2020-04-29 ASSESSMENT — PAIN DESCRIPTION - PROGRESSION: CLINICAL_PROGRESSION: GRADUALLY IMPROVING

## 2020-04-29 ASSESSMENT — PAIN - FUNCTIONAL ASSESSMENT: PAIN_FUNCTIONAL_ASSESSMENT: ACTIVITIES ARE NOT PREVENTED

## 2020-04-29 ASSESSMENT — PAIN DESCRIPTION - PAIN TYPE
TYPE: SURGICAL PAIN
TYPE: SURGICAL PAIN

## 2020-04-29 NOTE — PROGRESS NOTES
AVS signed and placed into chart. RN went over discharge instructions, follow up appointments, and prescriptions with Pt. Pt handed her paper script for tramadol. All of Pt's questions answered at this time. Pt discharged home and was transported home by  in a family vehicle.

## 2020-04-29 NOTE — CARE COORDINATION
Case Management Assessment            Discharge Note    Date / Time of Note: 4/29/2020 11:05 AM  Discharge Note Completed by: Chacho Paige    Patient discharged to home with  and home health care was ordered, but patient refused home health care. Patient's  will transport patient at discharge. Patient Name: Brock Dougherty   YOB: 1960  Diagnosis: Debility [R53.81]   Date / Time: 4/22/2020  6:38 PM    Current PCP: Roberto Elizabeth MD  Clinic patient: No    Hospitalization in the last 30 days: No    Advance Directives:  Code Status: Full Code  [facility] DNR form completed and on chart: No    Financial:  Payor: Brannon Ray / Plan: [facility] [place] / Product Type: *No Product type* /      Pharmacy:    [address] [facility], [address] [facility] [phone] - F [phone]  [facility] [place] [address]  Phone: [phone] Fax: [address] [facility], [address] [facility] [address] [phone] - F [phone]  [address] [place] [address] [address] [place] [address]  Phone: [phone] Fax: [address] [facility]  [facility] [address]  [address] [facility] 99 [facility]  Phone: [phone] Fax: [phone]    [place], [place] - [address] Cheryl Lock [phone] - F [phone]  [address] [facility] [address]  Phone: [phone] Fax: [phone]      Assistance purchasing medications?: Potential Assistance Purchasing Medications: No  Assistance provided by Case Management: None at this time    Does patient want to participate in local refill/ meds to beds program?: Yes    Meds To Beds General Rules:  1. Can ONLY be done Monday- Friday between 8:30am-5pm  2. Prescription(s) must be in pharmacy by 3pm to be filled same day  3. Copy of patient's insurance/ prescription drug card and patient face sheet must be sent along with the prescription(s)  4. Cost of Rx cannot be added to hospital bill. If financial assistance is needed, please contact unit  or ;  or  CANNOT provide pharmacy voucher for patients co-pays  5. Patients can then  the prescription on their way out of the hospital at discharge, or pharmacy can deliver to the bedside if staff is available. (payment due at time of pick-up or delivery - cash, check, or card accepted)     Able to afford home medications/ co-pay costs: Yes    DISCHARGE Disposition: Home with [facility] [address]: Patient refuses     LOC at discharge: Not Applicable  ED Completed: No    Notification completed in HENS/PAS?:  Not Applicable    IMM Completed:   Not Indicated    Transportation:  Transportation PLAN for discharge: family   Mode of Transport: [address] and her family were provided with choice of provider; she and her family are in agreement with the discharge plan.     Care Transitions patient: No    AUNDREA Hope, [facility]  Case Management Department  Ph: [phone]  Fax: [phone]

## 2020-04-29 NOTE — DISCHARGE SUMMARY
per week. Impairments: Decreased functional mobility    Medical Management:  Afib, LLE weakness, CAD, HTN, ovarian cancer    Discharge Exam:  Constitutional: Alert, WDWN, Pleasant, no distress  Head: Normocephalic, atruamatic, MMM  Eyes: Conjunctiva noninjected, no icterus, no drainage  Pulm: CTA bilat. Respirations non-labored. CV: No murmurs noted. RRR. Abd: Soft, nontender. NABS+  Ext: No edema, no varicosities  Neuro: Alert, fully oriented, appropriate   MSK: No joint abnormalities noted       Discharge Functional Status:    Physical therapy:  Bed Mobility: Scooting: Independent  Transfers: Sit to Stand: Contact guard assistance(From recliner, chair, and mat table; VC for hand placement and upright posture.)  Stand to sit: Contact guard assistance(VC for L LE placement and hand positioning.)  Bed to Chair: Contact guard assistance(with rW)  Comment: Pt completed 3 x 5 minutes on the seated stepper with B LEs only for improved functional strength and endurance., WB Status: n/a   Ambulation 1  Surface: level tile  Device: Rolling Walker  Other Apparatus: Knee Immobilizer, Left  Assistance: Contact guard assistance  Quality of Gait: Reciprocal pattern with fwd flexed posture. No knee buckling noted. Distance: 400', [de-identified]' and short distances in the gym  Comments: VC for upright posture with fwd gaze, improved positioning in the RW, and insuring proper placement of L LE to prevent knee buckling. , Stairs  # Steps : 12  Stairs Height: 6\"  Rails: Right ascending(with B UE)  Assistance: Contact guard assistance  Comment: Pt ascends/descends stairs with a step to pattern. No LOB and/or knee buckling noted. VCs for sequencing pattern. Mobility:  , PT Equipment Recommendations  Equipment Needed: No, Assessment: Pt able to complete ambulation this session without an episode of knee buckling.  Pt still with minimal quad contraction this session, but able to complete active knee ext in sidelying with the use of powder board. Pt at times utilizing substitutions to complete range despite VC. Cont with PT POC. Occupational therapy:  ,  , Assessment: Pt tolerated therapy session well progressing with dynamic standing balance and functional mobility. Pt met 4/5 LTGs but unable to meet LB dressing at mod I and d/t decreased L quad strength requiring spvn assist d/t tendency of L knee to buckle. Pt plans to discharge home tomorrow with [facility] OT and TTB. Speech therapy:         Significant Diagnostics:   Lab Results   Component Value Date    CREATININE 0.7 04/29/2020    BUN 15 04/29/2020     04/29/2020    K 4.5 04/29/2020     04/29/2020    CO2 25 04/29/2020       Lab Results   Component Value Date    WBC 9.7 04/29/2020    HGB 11.7 (L) 04/29/2020    HCT 35.5 (L) 04/29/2020    MCV 87.6 04/29/2020     (H) 04/29/2020       Disposition:  home    Services: PT/OT  DME: shower chair     Discharge Condition: Stable    Follow-up:  See after visit summary from hospitalization    Discharge Medications:     Medication List      START taking these medications    bisacodyl 5 MG EC tablet  Commonly known as:  DULCOLAX  Take 1 tablet by mouth daily  Notes to patient:  Use: Used for constipation and keeps stools soft  Side Effects: Stop taking if you have liquid stool     melatonin 1 MG tablet  Take 3 tablets by mouth nightly as needed for Sleep  Notes to patient:  Use: Help regulate sleep  Side effects: Headache, loose stools, dizziness     traMADol 50 MG tablet  Commonly known as:  ULTRAM  Take 1 tablet by mouth every 8 hours as needed for Pain for up to 7 days. Notes to patient:  Use: treatment of pain  Side effects: constipation, fatigue, dry mouth, insomnia        CHANGE how you take these medications    nitroGLYCERIN 0.4 MG SL tablet  Commonly known as:  NITROSTAT  up to max of 3 total doses. If no relief after 1 dose, call 911.   What changed:    · how much to take  · how to take this  · when to take this  · reasons to take this  · additional instructions  Notes to patient:  Use: Treat chest pain  Side effects: headache, flushing, dizziness    ** Take 1 tablet every 5 minutes for chest pain up to 3 times. Call doctor right away. CONTINUE taking these medications    apixaban 5 MG Tabs tablet  Commonly known as:  ELIQUIS  Take 1 tablet by mouth 2 times daily     atorvastatin 40 MG tablet  Commonly known as:  LIPITOR  TAKE ONE TABLET BY MOUTH ONCE DAILY     gabapentin 100 MG capsule  Commonly known as:  NEURONTIN     pantoprazole 40 MG tablet  Commonly known as:  PROTONIX  Take 1 tablet by mouth every morning (before breakfast)     sotalol 120 MG tablet  Commonly known as:  BETAPACE  Take 1 tablet by mouth 2 times daily        STOP taking these medications    metoprolol tartrate 25 MG tablet  Commonly known as:  LOPRESSOR           Where to Get Your Medications      These [facility] F [phone]  [address] [address]    Phone:  [phone]   · apixaban 5 MG Tabs tablet  · bisacodyl 5 MG EC tablet  · melatonin 1 MG tablet  · nitroGLYCERIN 0.4 MG SL tablet  · pantoprazole 40 MG tablet  · sotalol 120 MG tablet     You can get these medications from any pharmacy    Bring a paper prescription for each of these medications  · traMADol 50 MG tablet           I spent over 35 minutes on this discharge encounter between counseling, coordination of care, and medication reconciliation. To comply with [facility] by R.II.4.1:   Discharge order placed in advance to facilitate patients discharge needs.       Charles Davalos DO

## 2020-04-29 NOTE — PLAN OF CARE
Problem: Falls - Risk of:  Goal: Will remain free from falls  Description: Will remain free from falls  4/28/2020 2337 by Calin Cabrera RN  Note: Patient continues to ambulate with immobilizer to L knee due to multiple falls caused by knee buckling. Independent with putting on brace and transfers. Problem: Infection - Surgical Site:  Goal: Will show no infection signs and symptoms  Description: Will show no infection signs and symptoms  Note: No signs of infection to abdominal surgical incision. No drainage.

## 2020-05-05 ENCOUNTER — OFFICE VISIT (OUTPATIENT)
Dept: PRIMARY CARE CLINIC | Age: 60
End: 2020-05-05

## 2020-05-05 PROCEDURE — 99999 PR OFFICE/OUTPT VISIT,PROCEDURE ONLY: CPT | Performed by: NURSE PRACTITIONER

## 2020-05-05 NOTE — PROGRESS NOTES
Obstructive Sleep Apnea (MART) Screening     Patient:  Modesto Rhodes    YOB: 1960      Medical Record #:  [id number]                     Date:  5/5/2020     1. Are you a loud and/or regular snorer? []  Yes       [x] No    2. Have you been observed to gasp or stop breathing during sleep? []  Yes       [x] No    3. Do you feel tired or groggy upon awakening or do you awaken with a headache?           []  Yes       [x] No    4. Are you often tired or fatigued during the wake time hours? []  Yes       [x] No    5. Do you fall asleep sitting, reading, watching TV or driving? []  Yes       [x] No    6. Do you often have problems with memory or concentration? []  Yes       [x] No    **If patient's score is ? 3 they are considered high risk for MART. An Anesthesia provider will evaluate the patient and develop a plan of care the day of surgery. Note:  If the patient's BMI is more than 35 kg m¯² , has neck circumference > 40 cm, and/or high blood pressure the risk is greater (© American Sleep Apnea Association, 2006).

## 2020-05-05 NOTE — PROGRESS NOTES
Preoperative Screening for Elective Surgery/Invasive Procedures While COVID-19 present in the community     Have you tested positive or have been told to self-isolate for COVID-19 like symptoms within the past 28 days?  Do you currently have any of the following symptoms? o Fever >100.0 F or 99.9 F in immunocompromised patients? o New onset cough, shortness of breath or difficulty breathing?  o New onset sore throat, myalgia (muscle aches and pains), headache, loss of taste/smell or diarrhea?  Have you had a potential exposure to COVID-19 within the past 14 days by:  o Close contact with a confirmed case? o Close contact with a healthcare worker,  or essential infrastructure worker (grocery store, [employer], gas station, public utilities or transportation)? o Do you reside in a congregate setting such as; skilled nursing facility, adult home, correctional facility, homeless shelter or other institutional setting?  o Have you had recent travel to a known COVID-19 hotspot? Indicate if the patient has a positive screen by answering yes to one or more of the above questions. Patients who test positive or screen positive prior to surgery or on the day of surgery should be evaluated in conjunction with the surgeon/proceduralist/anesthesiologist to determine the urgency of the procedure. NO TO ALL QUESTIONS ABOVE.

## 2020-05-07 ENCOUNTER — PREP FOR PROCEDURE (OUTPATIENT)
Dept: CARDIOLOGY CLINIC | Age: 60
End: 2020-05-07

## 2020-05-07 ENCOUNTER — TELEPHONE (OUTPATIENT)
Dept: CARDIOLOGY CLINIC | Age: 60
End: 2020-05-07

## 2020-05-07 LAB
SARS-COV-2: NOT DETECTED
SOURCE: NORMAL

## 2020-05-07 RX ORDER — SODIUM CHLORIDE 9 MG/ML
INJECTION, SOLUTION INTRAVENOUS CONTINUOUS
Status: CANCELLED | OUTPATIENT
Start: 2020-05-07

## 2020-05-07 RX ORDER — SODIUM CHLORIDE 0.9 % (FLUSH) 0.9 %
10 SYRINGE (ML) INJECTION EVERY 12 HOURS SCHEDULED
Status: CANCELLED | OUTPATIENT
Start: 2020-05-07

## 2020-05-07 RX ORDER — SODIUM CHLORIDE 0.9 % (FLUSH) 0.9 %
10 SYRINGE (ML) INJECTION PRN
Status: CANCELLED | OUTPATIENT
Start: 2020-05-07

## 2020-05-07 NOTE — TELEPHONE ENCOUNTER
Electrophysiology Study (EPS) and Atrial Fibrillation (AFib) Ablation    Date of Procedure: June 5, 2020    Time of Arrival:  6:00 am    Cardiologist performing procedure:  Dr. Jeet Clinton    · Arrive at [facility] through the main entrance. Check in at the Outpatient Diagnostic desk on the 1st floor. · Do not eat or drink anything after midnight the night before the procedure. · You may brush your teeth and rinse the morning of the procedure. · Take ALL of your routine medications the morning of the procedure. However, if you are taking diabetic medications, please HOLD on the day of the procedure (including insulin). If you take Lantus insulin, take HALF of your usual dose the night before. · Do NOT stop taking aspirin, Plavix, coumadin, Eliquis, Xarelto, or Pradaxa (any blood thinners). However, do not take blood thinner the morning of the procedure. · Do not apply any lotion, powder, or deodorant the morning of the procedure. · Please bring a list of your medications to the hospital with you. · You must have someone available to drive you home the next day. It is recommended that you do not drive for 24 hours after the procedure. · Lab work is due 3 days before the procedure. · CTA pulmonary is due 3 days before the procedure. · If you are unable to make this appointment, please call [facility] at [phone].

## 2020-05-07 NOTE — RESULT ENCOUNTER NOTE
Please contact patient with their testing results: Your test for COVID-19, also known as novel coronavirus, came back negative. No virus was detected from the sample from your nose. Until your symptoms are fully resolved, you may still be contagious. We recommend that you remain isolated for 7 days minimum or 72 hours after your symptoms have completely resolved, whichever is longer. For example, if all symptoms improve after 2 days, you should still remain isolated for 7 days. If your symptoms get better after 10 days, you should remain isolated for 13 days. Continually monitor symptoms. Contact a medical provider if symptoms are worsening. If you have any additional questions, contact your doctor.     For additional information, please visit the Centers for Disease Control and Prevention ([employer].cy

## 2020-05-08 ENCOUNTER — ANESTHESIA EVENT (OUTPATIENT)
Dept: OPERATING ROOM | Age: 60
End: 2020-05-08
Payer: COMMERCIAL

## 2020-05-11 ENCOUNTER — APPOINTMENT (OUTPATIENT)
Dept: GENERAL RADIOLOGY | Age: 60
End: 2020-05-11
Attending: OBSTETRICS & GYNECOLOGY
Payer: COMMERCIAL

## 2020-05-11 ENCOUNTER — HOSPITAL ENCOUNTER (OUTPATIENT)
Dept: GENERAL RADIOLOGY | Age: 60
Discharge: HOME | End: 2020-05-11
Attending: OBSTETRICS & GYNECOLOGY
Payer: COMMERCIAL

## 2020-05-11 ENCOUNTER — ANESTHESIA (OUTPATIENT)
Dept: OPERATING ROOM | Age: 60
End: 2020-05-11
Payer: COMMERCIAL

## 2020-05-11 ENCOUNTER — HOSPITAL ENCOUNTER (OUTPATIENT)
Age: 60
Setting detail: OUTPATIENT SURGERY
Discharge: HOME | End: 2020-05-11
Attending: OBSTETRICS & GYNECOLOGY | Admitting: OBSTETRICS & GYNECOLOGY
Payer: COMMERCIAL

## 2020-05-11 VITALS — DIASTOLIC BLOOD PRESSURE: 72 MMHG | SYSTOLIC BLOOD PRESSURE: 135 MMHG | OXYGEN SATURATION: 100 %

## 2020-05-11 VITALS
OXYGEN SATURATION: 97 % | BODY MASS INDEX: 25.77 KG/M2 | HEART RATE: 70 BPM | HEIGHT: 69 IN | SYSTOLIC BLOOD PRESSURE: 118 MMHG | TEMPERATURE: 97.6 F | RESPIRATION RATE: 16 BRPM | DIASTOLIC BLOOD PRESSURE: 81 MMHG | WEIGHT: 174 LBS

## 2020-05-11 LAB
ABO/RH: NORMAL
ANTIBODY SCREEN: NORMAL

## 2020-05-11 PROCEDURE — 3600000002 HC SURGERY LEVEL 2 BASE: Performed by: OBSTETRICS & GYNECOLOGY

## 2020-05-11 PROCEDURE — 3600000012 HC SURGERY LEVEL 2 ADDTL 15MIN: Performed by: OBSTETRICS & GYNECOLOGY

## 2020-05-11 PROCEDURE — 2500000003 HC RX 250 WO HCPCS: Performed by: OBSTETRICS & GYNECOLOGY

## 2020-05-11 PROCEDURE — 7100000011 HC PHASE II RECOVERY - ADDTL 15 MIN: Performed by: OBSTETRICS & GYNECOLOGY

## 2020-05-11 PROCEDURE — 2580000003 HC RX 258: Performed by: OBSTETRICS & GYNECOLOGY

## 2020-05-11 PROCEDURE — 3700000001 HC ADD 15 MINUTES (ANESTHESIA): Performed by: OBSTETRICS & GYNECOLOGY

## 2020-05-11 PROCEDURE — 86901 BLOOD TYPING SEROLOGIC RH(D): CPT

## 2020-05-11 PROCEDURE — 2500000003 HC RX 250 WO HCPCS: Performed by: NURSE ANESTHETIST, CERTIFIED REGISTERED

## 2020-05-11 PROCEDURE — 6360000002 HC RX W HCPCS: Performed by: OBSTETRICS & GYNECOLOGY

## 2020-05-11 PROCEDURE — 7100000010 HC PHASE II RECOVERY - FIRST 15 MIN: Performed by: OBSTETRICS & GYNECOLOGY

## 2020-05-11 PROCEDURE — 3700000000 HC ANESTHESIA ATTENDED CARE: Performed by: OBSTETRICS & GYNECOLOGY

## 2020-05-11 PROCEDURE — 71045 X-RAY EXAM CHEST 1 VIEW: CPT

## 2020-05-11 PROCEDURE — 86850 RBC ANTIBODY SCREEN: CPT

## 2020-05-11 PROCEDURE — C1788 PORT, INDWELLING, IMP: HCPCS | Performed by: OBSTETRICS & GYNECOLOGY

## 2020-05-11 PROCEDURE — 2709999900 HC NON-CHARGEABLE SUPPLY: Performed by: OBSTETRICS & GYNECOLOGY

## 2020-05-11 PROCEDURE — 2580000003 HC RX 258: Performed by: ANESTHESIOLOGY

## 2020-05-11 PROCEDURE — 86900 BLOOD TYPING SEROLOGIC ABO: CPT

## 2020-05-11 PROCEDURE — 6360000002 HC RX W HCPCS: Performed by: NURSE ANESTHETIST, CERTIFIED REGISTERED

## 2020-05-11 PROCEDURE — 77001 FLUOROGUIDE FOR VEIN DEVICE: CPT

## 2020-05-11 DEVICE — PORT INFUS SGL LUMN ATTCH POLYUR OPN END CATH 8FR POWERPRT: Type: IMPLANTABLE DEVICE | Status: FUNCTIONAL

## 2020-05-11 RX ORDER — MORPHINE SULFATE 2 MG/ML
1 INJECTION, SOLUTION INTRAMUSCULAR; INTRAVENOUS EVERY 5 MIN PRN
Status: DISCONTINUED | OUTPATIENT
Start: 2020-05-11 | End: 2020-05-11 | Stop reason: HOSPADM

## 2020-05-11 RX ORDER — LABETALOL HYDROCHLORIDE 5 MG/ML
5 INJECTION, SOLUTION INTRAVENOUS EVERY 10 MIN PRN
Status: DISCONTINUED | OUTPATIENT
Start: 2020-05-11 | End: 2020-05-11 | Stop reason: HOSPADM

## 2020-05-11 RX ORDER — PROMETHAZINE HYDROCHLORIDE 25 MG/ML
6.25 INJECTION, SOLUTION INTRAMUSCULAR; INTRAVENOUS
Status: DISCONTINUED | OUTPATIENT
Start: 2020-05-11 | End: 2020-05-11 | Stop reason: HOSPADM

## 2020-05-11 RX ORDER — LIDOCAINE HYDROCHLORIDE 10 MG/ML
1 INJECTION, SOLUTION EPIDURAL; INFILTRATION; INTRACAUDAL; PERINEURAL
Status: DISCONTINUED | OUTPATIENT
Start: 2020-05-11 | End: 2020-05-11 | Stop reason: HOSPADM

## 2020-05-11 RX ORDER — SODIUM CHLORIDE 0.9 % (FLUSH) 0.9 %
10 SYRINGE (ML) INJECTION PRN
Status: DISCONTINUED | OUTPATIENT
Start: 2020-05-11 | End: 2020-05-11 | Stop reason: HOSPADM

## 2020-05-11 RX ORDER — PROPOFOL 10 MG/ML
INJECTION, EMULSION INTRAVENOUS PRN
Status: DISCONTINUED | OUTPATIENT
Start: 2020-05-11 | End: 2020-05-11 | Stop reason: SDUPTHER

## 2020-05-11 RX ORDER — ONDANSETRON 2 MG/ML
4 INJECTION INTRAMUSCULAR; INTRAVENOUS PRN
Status: DISCONTINUED | OUTPATIENT
Start: 2020-05-11 | End: 2020-05-11 | Stop reason: HOSPADM

## 2020-05-11 RX ORDER — BUPIVACAINE HYDROCHLORIDE AND EPINEPHRINE 5; 5 MG/ML; UG/ML
INJECTION, SOLUTION PERINEURAL PRN
Status: DISCONTINUED | OUTPATIENT
Start: 2020-05-11 | End: 2020-05-11 | Stop reason: ALTCHOICE

## 2020-05-11 RX ORDER — MIDAZOLAM HYDROCHLORIDE 1 MG/ML
INJECTION INTRAMUSCULAR; INTRAVENOUS PRN
Status: DISCONTINUED | OUTPATIENT
Start: 2020-05-11 | End: 2020-05-11 | Stop reason: SDUPTHER

## 2020-05-11 RX ORDER — MORPHINE SULFATE 2 MG/ML
2 INJECTION, SOLUTION INTRAMUSCULAR; INTRAVENOUS EVERY 5 MIN PRN
Status: DISCONTINUED | OUTPATIENT
Start: 2020-05-11 | End: 2020-05-11 | Stop reason: HOSPADM

## 2020-05-11 RX ORDER — SODIUM CHLORIDE, SODIUM LACTATE, POTASSIUM CHLORIDE, CALCIUM CHLORIDE 600; 310; 30; 20 MG/100ML; MG/100ML; MG/100ML; MG/100ML
INJECTION, SOLUTION INTRAVENOUS
Status: COMPLETED
Start: 2020-05-11 | End: 2020-05-11

## 2020-05-11 RX ORDER — DIPHENHYDRAMINE HYDROCHLORIDE 50 MG/ML
12.5 INJECTION INTRAMUSCULAR; INTRAVENOUS
Status: DISCONTINUED | OUTPATIENT
Start: 2020-05-11 | End: 2020-05-11 | Stop reason: HOSPADM

## 2020-05-11 RX ORDER — LIDOCAINE HYDROCHLORIDE 20 MG/ML
INJECTION, SOLUTION EPIDURAL; INFILTRATION; INTRACAUDAL; PERINEURAL PRN
Status: DISCONTINUED | OUTPATIENT
Start: 2020-05-11 | End: 2020-05-11 | Stop reason: SDUPTHER

## 2020-05-11 RX ORDER — HYDRALAZINE HYDROCHLORIDE 20 MG/ML
5 INJECTION INTRAMUSCULAR; INTRAVENOUS EVERY 10 MIN PRN
Status: DISCONTINUED | OUTPATIENT
Start: 2020-05-11 | End: 2020-05-11 | Stop reason: HOSPADM

## 2020-05-11 RX ORDER — SODIUM CHLORIDE, SODIUM LACTATE, POTASSIUM CHLORIDE, CALCIUM CHLORIDE 600; 310; 30; 20 MG/100ML; MG/100ML; MG/100ML; MG/100ML
INJECTION, SOLUTION INTRAVENOUS CONTINUOUS
Status: DISCONTINUED | OUTPATIENT
Start: 2020-05-11 | End: 2020-05-11 | Stop reason: HOSPADM

## 2020-05-11 RX ORDER — OXYCODONE HYDROCHLORIDE AND ACETAMINOPHEN 5; 325 MG/1; MG/1
1 TABLET ORAL PRN
Status: DISCONTINUED | OUTPATIENT
Start: 2020-05-11 | End: 2020-05-11 | Stop reason: HOSPADM

## 2020-05-11 RX ORDER — SODIUM CHLORIDE 0.9 % (FLUSH) 0.9 %
10 SYRINGE (ML) INJECTION EVERY 12 HOURS SCHEDULED
Status: DISCONTINUED | OUTPATIENT
Start: 2020-05-11 | End: 2020-05-11 | Stop reason: HOSPADM

## 2020-05-11 RX ORDER — OXYCODONE HYDROCHLORIDE AND ACETAMINOPHEN 5; 325 MG/1; MG/1
2 TABLET ORAL PRN
Status: DISCONTINUED | OUTPATIENT
Start: 2020-05-11 | End: 2020-05-11 | Stop reason: HOSPADM

## 2020-05-11 RX ADMIN — PROPOFOL 50 MG: 10 INJECTION, EMULSION INTRAVENOUS at 13:00

## 2020-05-11 RX ADMIN — MIDAZOLAM HYDROCHLORIDE 2 MG: 2 INJECTION, SOLUTION INTRAMUSCULAR; INTRAVENOUS at 12:48

## 2020-05-11 RX ADMIN — SODIUM CHLORIDE, POTASSIUM CHLORIDE, SODIUM LACTATE AND CALCIUM CHLORIDE: 600; 310; 30; 20 INJECTION, SOLUTION INTRAVENOUS at 12:57

## 2020-05-11 RX ADMIN — CEFAZOLIN 2 G: 330 INJECTION, POWDER, FOR SOLUTION INTRAMUSCULAR; INTRAVENOUS at 12:51

## 2020-05-11 RX ADMIN — MIDAZOLAM HYDROCHLORIDE 1 MG: 2 INJECTION, SOLUTION INTRAMUSCULAR; INTRAVENOUS at 12:58

## 2020-05-11 RX ADMIN — LIDOCAINE HYDROCHLORIDE 50 MG: 20 INJECTION, SOLUTION EPIDURAL; INFILTRATION; INTRACAUDAL; PERINEURAL at 13:00

## 2020-05-11 ASSESSMENT — PULMONARY FUNCTION TESTS
PIF_VALUE: 1
PIF_VALUE: 0
PIF_VALUE: 1
PIF_VALUE: 0
PIF_VALUE: 1
PIF_VALUE: 0
PIF_VALUE: 1
PIF_VALUE: 0

## 2020-05-11 ASSESSMENT — PAIN - FUNCTIONAL ASSESSMENT: PAIN_FUNCTIONAL_ASSESSMENT: 0-10

## 2020-05-11 NOTE — ANESTHESIA PRE PROCEDURE
Once PRN Yvonne Schofield MD        lactated ringers infusion                Allergies:  No Known Allergies    Problem List:    Patient Active Problem List   Diagnosis Code    Coronary artery disease I25.10    Hyperlipidemia E78.5    HTN (hypertension) I10    Alcohol abuse F10.10    Tobacco abuse Z72.0    Ovarian cancer on left ([facility] C56.2    Debility R53.81    Atrial fibrillation with RVR ([facility] I48.91       Past Medical History:        Diagnosis Date    Cancer ([facility]     ovary    Coronary artery disease 2008    2 stents,     Heart disease     Hyperlipemia     Hypertension     MI, old 2008    2 stents, requiered another stent in 2009        Past Surgical History:        Procedure Laterality Date    CARDIAC SURGERY  2008, 2009    stents X3    HYSTERECTOMY Bilateral 4/8/2020    DIAGNOSTIC LAPAROSCOPY,; LAPAROTOMY RESECTION OF A PELVIC MASS, COMPLETE HYSTERECTOMY, BILATERAL SALPINGO-OOPHORECTOMY, PELVIC LYMPHADENECTOMY,  OMENTECTOMY, APPENDECTOMY; RADICAL PERIMETRECTOMY; TUMOR DEBULKING performed by Eric Munoz DO at [address] [place] [address]  2008    TUBAL LIGATION         Social History:    Social History     Tobacco Use    Smoking status: Current Every Day Smoker     Packs/day: 0.50     Types: Cigarettes    Smokeless tobacco: Never Used    Tobacco comment: down to half a pk/day   Substance Use Topics    Alcohol use: No     Alcohol/week: 0.0 standard drinks                                Ready to quit: Not Answered  Counseling given: Not Answered  Comment: down to half a pk/day      Vital Signs (Current):   Vitals:    05/05/20 1303 05/11/20 1125   BP:  117/67   Pulse:  66   Resp:  18   Temp:  97.6 °F (36.4 °C)   TempSrc:  Temporal   SpO2:  98%   Weight: 180 lb (81.6 kg) 174 lb (78.9 kg)   Height: 5' 9\" (1.753 m) 5' 9\" (1.753 m)                                              BP Readings from Last 3 Encounters:   05/11/20 117/67   04/29/20 116/73   04/22/20 111/64       NPO Status: Time of last Neuro/Psych:   (+) depression/anxiety             GI/Hepatic/Renal: Neg GI/Hepatic/Renal ROS            Endo/Other:    (+) malignancy/cancer. Abdominal:           Vascular: negative vascular ROS. Anesthesia Plan      MAC     ASA 3       Induction: intravenous. MIPS: Postoperative opioids intended and Prophylactic antiemetics administered. Anesthetic plan and risks discussed with patient. Plan discussed with CRNA.                   Gisselle Stephens MD   5/11/2020

## 2020-05-11 NOTE — PROGRESS NOTES
Preoperative Screening for Elective Surgery/Invasive Procedures While COVID-19 present in the community     Have you tested positive or have been told to self-isolate for COVID-19 like symptoms within the past 28 days? no   Do you currently have any of the following symptoms? o Fever >100.0 F or 99.9 F in immunocompromised patients? o New onset cough, shortness of breath or difficulty breathing?  o New onset sore throat, myalgia (muscle aches and pains), headache, loss of taste/smell or diarrhea? no   Have you had a potential exposure to COVID-19 within the past 14 days by:  o Close contact with a confirmed case? o Close contact with a healthcare worker,  or essential infrastructure worker (grocery store, [employer], gas station, public utilities or transportation)? o Do you reside in a congregate setting such as; skilled nursing facility, adult home, correctional facility, homeless shelter or other institutional setting?  o Have you had recent travel to a known COVID-19 hotspot? no    Indicate if the patient has a positive screen by answering yes to one or more of the above questions. Patients who test positive or screen positive prior to surgery or on the day of surgery should be evaluated in conjunction with the surgeon/proceduralist/anesthesiologist to determine the urgency of the procedure.

## 2020-05-11 NOTE — H&P
GYNECOLOGIC ONCOLOGY H&P NOTE    Patient Name: Mathew Gomez   Patient : 1960   Patient MRN: [id number]   Referring Physician: Zulema Silva  GYN ONC: Ranjit Cueavs DO  Med/Heme Onc: none  OBGYN: Anayeli Amaro MD  Primary Care: Dr. Dago Zayas  Cardio: Quiana Crum MD    Date of Service: 2020     Primary Diagnosis:  1. 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 2020: Diagnostic laparoscopy, then Xlap, SHARDA, BSO, Tumor debulking, LND, APPY, Radical parametrectomy     Chief Complaint  ovarian carcinoma    MARKUS Sharif is a 62 yo female, , seen in consultation from Dr. Anayeli Amaro for an enlarged 10cm pelvic mass and a very elevated  level of 16K. She was being seen by Dr. Cailin Gamboa for PMB. Had been menopausal x 8years. Poor gyn care prior to her recent visit with Dr. Cailin Gamboa. A pap and pelvic was done at her initial visit with him on 3/9/20. Cytology and HPV were negative. A pelvic U/S was then ordered and done on 3/16/20. Demonstrated an enlarged 10cm right ovarian mass. ES was thickened at 1.2cm. An EMB was then done on 3/20/20. Results pending.  was ordered given the enlarged right ovarian mass and was elevated at [address]. [facility]. Hx of MI x 2. S/p cardiac stents x 3 (one in  and two in ) - followed by Dr. Milvia Giron  Was on Plavix x 5 years. Not on any present anti-coagulation  No prior abdominal surgeries aside from a BTL    Interval History  Interval history:  Patient was seen and examined in preop. Here for surgical intervention. No changes in health since last visit. Doing well. No new questions/concerns.  Desires to proceed as discussed    Problem List / Past Medical History  Ovarian cancer, R ovary   BMI 25-29 - overweight   Elevated    HX of cardiac catheterization   Tobacco use        Medications   Sotalol Oral 120 mg tablet 1 tablet orally 2 times per day quantity sufficient for 60 days; 3 refills   Bisacodyl Oral Delayed Release 5 mg tablet,delayed release (DR/EC) 1 tablet,delayed release (DR/EC) orally every day at bedtime quantity sufficient for 30 days; 1 refills   Eliquis (Apixaban Oral) 5 mg tablet 1 tablet orally 2 times per day quantity sufficient for 30 days; 5 refills   Nitroglycerin Sublingual 0.4 mg sublingually every 5 to 15 minutes prn chest pain; do not exceed 3 doses per episode   Pantoprazole (Sodium) Oral Delayed Release 40 mg tablet,delayed release (DR/EC) 1 tablet,delayed release (DR/EC) orally 1 daily before breakfast quantity sufficient for 30 days; 3 refills   Lipitor (Atorvastatin Oral) 40 mg orally daily      Review of Systems  Positive for the above findings listed in the H&P or interval history. Rest of 14 points systems review was negative. Vital Signs  /67   Pulse 66   Temp 97.6 °F (36.4 °C) (Temporal)   Resp 18   Ht 5' 9\" (1.753 m)   Wt 174 lb (78.9 kg)   SpO2 98%   BMI 25.70 kg/m²       Physical Exam   CONSTITUTIONAL: awake, alert, cooperative, no apparent distress   EYES: pupils equal, round and reactive to light, sclera clear and conjunctiva normal   ENT: Normocephalic, without obvious abnormality, atramatic   NECK: supple, symmetrical, no jugular venous distension and no carotid bruits   HEMATOLOGIC/LYMPHATIC: no cervical, supraclavicular, axillary or inguinal lymphadenopathy   LUNGS: no increased work of breathing and clear to auscultation   CARDIOVASCULAR: regular rate and rhythm, normal S1 and S2, no murmur noted   ABDOMEN: normal bowel sounds x 4, soft, non-distended, non-tender, no masses palpated, no hepatosplenomgally   INCISIONS: C/D/I. well healed. MUSCULOSKELETAL: Left lower extremity weakness   NEUROLOGIC: awake, alert, oriented to name, place and time. Motor skills grossly intact. SKIN: Normal skin color, texture, turgor and no jaundice.  appears intact   EXTREMITIES: no LE edema   GYNECOLOGIC: SSE and bimanual exam performed. Absent uterus and cervix. Vaginal cuff well approximated. No bleeding or discharge. No masses or lesions. RECTAL: normal tone. No masses.        Labs  CBC   Lab Results 05/05/2020 04/29/2020 04/21/2020 04/18/2020 04/17/2020 03/27/2020   CBC                     WBC x 10^3/uL 8.9             7.7   RBC x 10^6/uL 4.23             4.33   HGB g/dL 12.1             13.0   HCT % 37.3             39.0   MCV fL 88.2             89.9   MCH pg 28.6             30.0   MCHC g/dL 32.4             33.4   RDW %                14.8   RDW-CV, % 14.2                  PLT x 10^3/uL 409.0 (H)             310   MPV fL                8.6   Sulma % 63.7                  LY % 19.5                  MO % 7.4                  EO % 9.3 (H)                  BA % 0.1                  Sulma # (ANC) x 10^3/uL 5.67                  LY # x 10^3/uL 1.74                  MO # x 10^3/uL 0.66                  EO # x 10^3/uL 0.83 (H)                  BA # x 10^3/uL 0.01                  CMP   Lab Results 05/05/2020 04/29/2020 04/21/2020 04/18/2020 04/17/2020 03/27/2020   Chemistries                     Glucose mg/dL 99                  Glucose, serum mg/dL                155 (H)   BUN mg/dL 11             16   Creatinine mg/dL 0.5 (L)             0.5 (L)   Sodium mmol/L 144             135 (L)   Potassium mmol/L 4.4             4.3   Chloride mmol/L 107             99   CO2 mmol/L 28             23   Anion gap                13   Calcium mg/dL 9.4             9.3   Albumin g/dL 3.5             3.9   Total protein g/dL 7.4             7.1   Globulin g/dL                3.2   A/G ratio                1.2   Bilirubin, total mg/dL 0.7             <0.2   Alkaline phosphatase U/L 133             159 (H)   AST/SGOT U/L 25             24   ALT/SGPT U/L 26             22   GFR non-African American, estimated mL/min/1.73m2 >60.0             >60   GFR African American, estimated mL/min/1.73m2 >60.0             >60     Lab Results

## 2020-05-11 NOTE — OP NOTE
2:50 AM   Urine Color Yellow 4/9/2020  2:50 AM   Urine Appearance Clear 4/9/2020  2:50 AM   Output (mL) 200 mL 4/9/2020  5:21 AM       [REMOVED] Urethral Catheter (Removed)   Site Assessment No urethral drainage 4/11/2020 11:33 AM   Urine Color Yellow 4/11/2020 11:33 AM   Urine Appearance Clear 4/11/2020 11:33 AM   Urine Odor Other (Comment) 4/11/2020  2:45 AM   Output (mL) 225 mL 4/11/2020 11:33 AM       Findings:   Left SCV vein and artery visualized under ultrasound guidance    Detailed Description of Procedure:   After assuring informed consent the patient was taken back to the operating suite and induction of anesthesia was done. The patient was placed in supine position with a gel roll placed under the patients upper thoracic spine. The left SCV and artery were identified under ultrasound guidance. The anterior chest and neck were prepped and draped in the normal sterile fashion. The patient was placed in trendelenberg position and 1% lidocaine was used to anesthetize the left infraclavicular space. The introducer needle was used to cannulate the left subclavian vein via the modified Seldinger technique without difficulty. The guide wire was inserted through the needle and proper placement at the atriocaval junction was confirmed with fluoroscopy. The needle was then removed and the patient was taken out of trendelenberg position. A 3 to 4 cm incision was made below the clavicle starting at the top of the guidewire. The bovie was used to carry this incision down to the fascia to create the port pocket. The dilator was then placed over the guidewire and the guidewire was removed. The catheter was placed through the dilator sheath and the dilator sheath was then removed. The tip of the catheter was noted at the atriocaval junction under fluoroscopy and good blood return was noted through the catheter. The catheter was at 20 cm at the skin edge. The catheter was then trimmed and attached to the port.

## 2020-05-27 ENCOUNTER — TELEPHONE (OUTPATIENT)
Dept: CARDIOLOGY CLINIC | Age: 60
End: 2020-05-27

## 2020-06-01 ENCOUNTER — OFFICE VISIT (OUTPATIENT)
Dept: PRIMARY CARE CLINIC | Age: 60
End: 2020-06-01

## 2020-06-02 ENCOUNTER — HOSPITAL ENCOUNTER (OUTPATIENT)
Dept: CT IMAGING | Age: 60
Discharge: HOME | End: 2020-06-02
Payer: COMMERCIAL

## 2020-06-02 PROCEDURE — 71275 CT ANGIOGRAPHY CHEST: CPT

## 2020-06-02 PROCEDURE — 6360000004 HC RX CONTRAST MEDICATION: Performed by: INTERNAL MEDICINE

## 2020-06-02 PROCEDURE — 6360000002 HC RX W HCPCS: Performed by: RADIOLOGY

## 2020-06-02 RX ORDER — HEPARIN SODIUM (PORCINE) LOCK FLUSH IV SOLN 100 UNIT/ML 100 UNIT/ML
100 SOLUTION INTRAVENOUS PRN
Status: DISCONTINUED | OUTPATIENT
Start: 2020-06-02 | End: 2020-06-03 | Stop reason: HOSPADM

## 2020-06-02 RX ADMIN — Medication 100 UNITS: at 10:22

## 2020-06-02 RX ADMIN — IOPAMIDOL 80 ML: 755 INJECTION, SOLUTION INTRAVENOUS at 10:18

## 2020-06-03 LAB
SARS-COV-2: NOT DETECTED
SOURCE: NORMAL

## 2020-06-05 ENCOUNTER — ANESTHESIA EVENT (OUTPATIENT)
Dept: CARDIAC CATH/INVASIVE PROCEDURES | Age: 60
End: 2020-06-05

## 2020-06-05 ENCOUNTER — ANESTHESIA (OUTPATIENT)
Dept: CARDIAC CATH/INVASIVE PROCEDURES | Age: 60
End: 2020-06-05

## 2020-06-05 ENCOUNTER — HOSPITAL ENCOUNTER (OUTPATIENT)
Dept: CARDIAC CATH/INVASIVE PROCEDURES | Age: 60
Discharge: HOME | End: 2020-06-07
Payer: COMMERCIAL

## 2020-06-05 VITALS
RESPIRATION RATE: 12 BRPM | TEMPERATURE: 98.4 F | OXYGEN SATURATION: 92 % | SYSTOLIC BLOOD PRESSURE: 128 MMHG | DIASTOLIC BLOOD PRESSURE: 60 MMHG

## 2020-06-05 VITALS — WEIGHT: 184 LBS | BODY MASS INDEX: 27.25 KG/M2 | HEIGHT: 69 IN | TEMPERATURE: 98.2 F

## 2020-06-05 LAB
HCT VFR BLD CALC: 27 % (ref 36–48)
HEMOGLOBIN: 8.9 G/DL (ref 12–16)
MCH RBC QN AUTO: 29.1 PG (ref 26–34)
MCHC RBC AUTO-ENTMCNC: 33.1 G/DL (ref 31–36)
MCV RBC AUTO: 88 FL (ref 80–100)
PDW BLD-RTO: 15.5 % (ref 12.4–15.4)
PLATELET # BLD: 157 K/UL (ref 135–450)
PMV BLD AUTO: 9.3 FL (ref 5–10.5)
POC ACT LR: 152 SEC
POC ACT LR: 247 SEC
POC ACT LR: 251 SEC
POC ACT LR: 324 SEC
POC ACT LR: 334 SEC
RBC # BLD: 3.07 M/UL (ref 4–5.2)
WBC # BLD: 8 K/UL (ref 4–11)

## 2020-06-05 PROCEDURE — 93656 COMPRE EP EVAL ABLTJ ATR FIB: CPT | Performed by: INTERNAL MEDICINE

## 2020-06-05 PROCEDURE — 93613 INTRACARDIAC EPHYS 3D MAPG: CPT

## 2020-06-05 PROCEDURE — 2709999900 HC NON-CHARGEABLE SUPPLY

## 2020-06-05 PROCEDURE — C1730 CATH, EP, 19 OR FEW ELECT: HCPCS

## 2020-06-05 PROCEDURE — 93656 COMPRE EP EVAL ABLTJ ATR FIB: CPT

## 2020-06-05 PROCEDURE — 2580000003 HC RX 258: Performed by: NURSE ANESTHETIST, CERTIFIED REGISTERED

## 2020-06-05 PROCEDURE — 85027 COMPLETE CBC AUTOMATED: CPT

## 2020-06-05 PROCEDURE — 6360000002 HC RX W HCPCS: Performed by: NURSE ANESTHETIST, CERTIFIED REGISTERED

## 2020-06-05 PROCEDURE — C1731 CATH, EP, 20 OR MORE ELEC: HCPCS

## 2020-06-05 PROCEDURE — C1759 CATH, INTRA ECHOCARDIOGRAPHY: HCPCS

## 2020-06-05 PROCEDURE — 93622 COMP EP EVAL L VENTR PAC&REC: CPT

## 2020-06-05 PROCEDURE — 3700000001 HC ADD 15 MINUTES (ANESTHESIA)

## 2020-06-05 PROCEDURE — 93622 COMP EP EVAL L VENTR PAC&REC: CPT | Performed by: INTERNAL MEDICINE

## 2020-06-05 PROCEDURE — 93662 INTRACARDIAC ECG (ICE): CPT

## 2020-06-05 PROCEDURE — 3700000000 HC ANESTHESIA ATTENDED CARE

## 2020-06-05 PROCEDURE — C1732 CATH, EP, DIAG/ABL, 3D/VECT: HCPCS

## 2020-06-05 PROCEDURE — 93623 PRGRMD STIMJ&PACG IV RX NFS: CPT

## 2020-06-05 PROCEDURE — 93005 ELECTROCARDIOGRAM TRACING: CPT | Performed by: INTERNAL MEDICINE

## 2020-06-05 PROCEDURE — 85347 COAGULATION TIME ACTIVATED: CPT

## 2020-06-05 PROCEDURE — 93623 PRGRMD STIMJ&PACG IV RX NFS: CPT | Performed by: INTERNAL MEDICINE

## 2020-06-05 PROCEDURE — C1894 INTRO/SHEATH, NON-LASER: HCPCS

## 2020-06-05 PROCEDURE — 93662 INTRACARDIAC ECG (ICE): CPT | Performed by: INTERNAL MEDICINE

## 2020-06-05 PROCEDURE — 2720000010 HC SURG SUPPLY STERILE

## 2020-06-05 PROCEDURE — 2500000003 HC RX 250 WO HCPCS: Performed by: NURSE ANESTHETIST, CERTIFIED REGISTERED

## 2020-06-05 PROCEDURE — 2580000003 HC RX 258: Performed by: INTERNAL MEDICINE

## 2020-06-05 PROCEDURE — 93613 INTRACARDIAC EPHYS 3D MAPG: CPT | Performed by: INTERNAL MEDICINE

## 2020-06-05 RX ORDER — SODIUM CHLORIDE, SODIUM LACTATE, POTASSIUM CHLORIDE, CALCIUM CHLORIDE 600; 310; 30; 20 MG/100ML; MG/100ML; MG/100ML; MG/100ML
INJECTION, SOLUTION INTRAVENOUS CONTINUOUS
Status: DISCONTINUED | OUTPATIENT
Start: 2020-06-05 | End: 2020-06-08 | Stop reason: HOSPADM

## 2020-06-05 RX ORDER — HEPARIN SODIUM 10000 [USP'U]/100ML
INJECTION, SOLUTION INTRAVENOUS CONTINUOUS PRN
Status: DISCONTINUED | OUTPATIENT
Start: 2020-06-05 | End: 2020-06-05 | Stop reason: SDUPTHER

## 2020-06-05 RX ORDER — FENTANYL CITRATE 50 UG/ML
50 INJECTION, SOLUTION INTRAMUSCULAR; INTRAVENOUS EVERY 5 MIN PRN
Status: DISCONTINUED | OUTPATIENT
Start: 2020-06-05 | End: 2020-06-08 | Stop reason: HOSPADM

## 2020-06-05 RX ORDER — SODIUM CHLORIDE 0.9 % (FLUSH) 0.9 %
10 SYRINGE (ML) INJECTION EVERY 12 HOURS SCHEDULED
Status: DISCONTINUED | OUTPATIENT
Start: 2020-06-05 | End: 2020-06-08 | Stop reason: HOSPADM

## 2020-06-05 RX ORDER — DEXAMETHASONE SODIUM PHOSPHATE 4 MG/ML
INJECTION, SOLUTION INTRA-ARTICULAR; INTRALESIONAL; INTRAMUSCULAR; INTRAVENOUS; SOFT TISSUE PRN
Status: DISCONTINUED | OUTPATIENT
Start: 2020-06-05 | End: 2020-06-05 | Stop reason: SDUPTHER

## 2020-06-05 RX ORDER — MIDAZOLAM HYDROCHLORIDE 1 MG/ML
INJECTION INTRAMUSCULAR; INTRAVENOUS PRN
Status: DISCONTINUED | OUTPATIENT
Start: 2020-06-05 | End: 2020-06-05 | Stop reason: SDUPTHER

## 2020-06-05 RX ORDER — SODIUM CHLORIDE 0.9 % (FLUSH) 0.9 %
10 SYRINGE (ML) INJECTION PRN
Status: DISCONTINUED | OUTPATIENT
Start: 2020-06-05 | End: 2020-06-05 | Stop reason: SDUPTHER

## 2020-06-05 RX ORDER — OXYCODONE HYDROCHLORIDE AND ACETAMINOPHEN 5; 325 MG/1; MG/1
1 TABLET ORAL
Status: ACTIVE | OUTPATIENT
Start: 2020-06-05 | End: 2020-06-05

## 2020-06-05 RX ORDER — HEPARIN SODIUM 1000 [USP'U]/ML
INJECTION, SOLUTION INTRAVENOUS; SUBCUTANEOUS PRN
Status: DISCONTINUED | OUTPATIENT
Start: 2020-06-05 | End: 2020-06-05 | Stop reason: SDUPTHER

## 2020-06-05 RX ORDER — SODIUM CHLORIDE 9 MG/ML
INJECTION, SOLUTION INTRAVENOUS CONTINUOUS
Status: DISCONTINUED | OUTPATIENT
Start: 2020-06-05 | End: 2020-06-08 | Stop reason: HOSPADM

## 2020-06-05 RX ORDER — PROPOFOL 10 MG/ML
INJECTION, EMULSION INTRAVENOUS PRN
Status: DISCONTINUED | OUTPATIENT
Start: 2020-06-05 | End: 2020-06-05 | Stop reason: SDUPTHER

## 2020-06-05 RX ORDER — FENTANYL CITRATE 50 UG/ML
25 INJECTION, SOLUTION INTRAMUSCULAR; INTRAVENOUS EVERY 5 MIN PRN
Status: DISCONTINUED | OUTPATIENT
Start: 2020-06-05 | End: 2020-06-08 | Stop reason: HOSPADM

## 2020-06-05 RX ORDER — SODIUM CHLORIDE 0.9 % (FLUSH) 0.9 %
10 SYRINGE (ML) INJECTION EVERY 12 HOURS SCHEDULED
Status: DISCONTINUED | OUTPATIENT
Start: 2020-06-05 | End: 2020-06-05 | Stop reason: SDUPTHER

## 2020-06-05 RX ORDER — ROCURONIUM BROMIDE 10 MG/ML
INJECTION, SOLUTION INTRAVENOUS PRN
Status: DISCONTINUED | OUTPATIENT
Start: 2020-06-05 | End: 2020-06-05 | Stop reason: SDUPTHER

## 2020-06-05 RX ORDER — SODIUM CHLORIDE 0.9 % (FLUSH) 0.9 %
10 SYRINGE (ML) INJECTION PRN
Status: DISCONTINUED | OUTPATIENT
Start: 2020-06-05 | End: 2020-06-08 | Stop reason: HOSPADM

## 2020-06-05 RX ORDER — PROMETHAZINE HYDROCHLORIDE 25 MG/ML
6.25 INJECTION, SOLUTION INTRAMUSCULAR; INTRAVENOUS
Status: ACTIVE | OUTPATIENT
Start: 2020-06-05 | End: 2020-06-05

## 2020-06-05 RX ORDER — LIDOCAINE HYDROCHLORIDE 20 MG/ML
INJECTION, SOLUTION INFILTRATION; PERINEURAL PRN
Status: DISCONTINUED | OUTPATIENT
Start: 2020-06-05 | End: 2020-06-05 | Stop reason: SDUPTHER

## 2020-06-05 RX ORDER — METHOCARBAMOL 750 MG/1
750 TABLET, FILM COATED ORAL 4 TIMES DAILY PRN
COMMUNITY
End: 2021-11-03

## 2020-06-05 RX ORDER — ACETAMINOPHEN 325 MG/1
650 TABLET ORAL EVERY 4 HOURS PRN
Status: DISCONTINUED | OUTPATIENT
Start: 2020-06-05 | End: 2020-06-08 | Stop reason: HOSPADM

## 2020-06-05 RX ORDER — PROTAMINE SULFATE 10 MG/ML
INJECTION, SOLUTION INTRAVENOUS PRN
Status: DISCONTINUED | OUTPATIENT
Start: 2020-06-05 | End: 2020-06-05 | Stop reason: SDUPTHER

## 2020-06-05 RX ORDER — ONDANSETRON 2 MG/ML
4 INJECTION INTRAMUSCULAR; INTRAVENOUS
Status: ACTIVE | OUTPATIENT
Start: 2020-06-05 | End: 2020-06-05

## 2020-06-05 RX ORDER — FUROSEMIDE 10 MG/ML
INJECTION INTRAMUSCULAR; INTRAVENOUS PRN
Status: DISCONTINUED | OUTPATIENT
Start: 2020-06-05 | End: 2020-06-05 | Stop reason: SDUPTHER

## 2020-06-05 RX ORDER — HYDRALAZINE HYDROCHLORIDE 20 MG/ML
5 INJECTION INTRAMUSCULAR; INTRAVENOUS EVERY 10 MIN PRN
Status: DISCONTINUED | OUTPATIENT
Start: 2020-06-05 | End: 2020-06-08 | Stop reason: HOSPADM

## 2020-06-05 RX ORDER — KETOROLAC TROMETHAMINE 30 MG/ML
30 INJECTION, SOLUTION INTRAMUSCULAR; INTRAVENOUS ONCE
Status: DISCONTINUED | OUTPATIENT
Start: 2020-06-05 | End: 2020-06-08 | Stop reason: HOSPADM

## 2020-06-05 RX ORDER — ONDANSETRON 2 MG/ML
INJECTION INTRAMUSCULAR; INTRAVENOUS PRN
Status: DISCONTINUED | OUTPATIENT
Start: 2020-06-05 | End: 2020-06-05 | Stop reason: SDUPTHER

## 2020-06-05 RX ORDER — LABETALOL 20 MG/4 ML (5 MG/ML) INTRAVENOUS SYRINGE
5 EVERY 10 MIN PRN
Status: DISCONTINUED | OUTPATIENT
Start: 2020-06-05 | End: 2020-06-08 | Stop reason: HOSPADM

## 2020-06-05 RX ORDER — ESMOLOL HYDROCHLORIDE 10 MG/ML
INJECTION INTRAVENOUS PRN
Status: DISCONTINUED | OUTPATIENT
Start: 2020-06-05 | End: 2020-06-05 | Stop reason: SDUPTHER

## 2020-06-05 RX ADMIN — ESMOLOL HYDROCHLORIDE 30 MG: 10 INJECTION, SOLUTION INTRAVENOUS at 08:16

## 2020-06-05 RX ADMIN — PHENYLEPHRINE HYDROCHLORIDE 40 MCG: 10 INJECTION, SOLUTION INTRAMUSCULAR; INTRAVENOUS; SUBCUTANEOUS at 10:34

## 2020-06-05 RX ADMIN — HEPARIN SODIUM 1000 UNITS/HR: 10000 INJECTION, SOLUTION INTRAVENOUS at 09:39

## 2020-06-05 RX ADMIN — PROPOFOL 150 MG: 10 INJECTION, EMULSION INTRAVENOUS at 08:16

## 2020-06-05 RX ADMIN — PROTAMINE SULFATE 40 MG: 10 INJECTION, SOLUTION INTRAVENOUS at 10:34

## 2020-06-05 RX ADMIN — LIDOCAINE HYDROCHLORIDE 60 MG: 20 INJECTION, SOLUTION INFILTRATION; PERINEURAL at 08:16

## 2020-06-05 RX ADMIN — SODIUM CHLORIDE, POTASSIUM CHLORIDE, SODIUM LACTATE AND CALCIUM CHLORIDE: 600; 310; 30; 20 INJECTION, SOLUTION INTRAVENOUS at 08:03

## 2020-06-05 RX ADMIN — HEPARIN SODIUM 5000 UNITS: 1000 INJECTION, SOLUTION INTRAVENOUS; SUBCUTANEOUS at 08:54

## 2020-06-05 RX ADMIN — DEXAMETHASONE SODIUM PHOSPHATE 10 MG: 4 INJECTION, SOLUTION INTRAMUSCULAR; INTRAVENOUS at 08:43

## 2020-06-05 RX ADMIN — PHENYLEPHRINE HYDROCHLORIDE 50 MCG/MIN: 10 INJECTION, SOLUTION INTRAMUSCULAR; INTRAVENOUS; SUBCUTANEOUS at 08:22

## 2020-06-05 RX ADMIN — ROCURONIUM BROMIDE 100 MG: 10 INJECTION, SOLUTION INTRAVENOUS at 08:16

## 2020-06-05 RX ADMIN — SUGAMMADEX 200 MG: 100 INJECTION, SOLUTION INTRAVENOUS at 10:45

## 2020-06-05 RX ADMIN — ISOPROTERENOL HYDROCHLORIDE 5 MCG/MIN: 0.2 INJECTION, SOLUTION INTRAMUSCULAR; INTRAVENOUS at 10:16

## 2020-06-05 RX ADMIN — MIDAZOLAM HYDROCHLORIDE 2 MG: 2 INJECTION, SOLUTION INTRAMUSCULAR; INTRAVENOUS at 07:58

## 2020-06-05 RX ADMIN — HEPARIN SODIUM 3000 UNITS: 1000 INJECTION, SOLUTION INTRAVENOUS; SUBCUTANEOUS at 09:54

## 2020-06-05 RX ADMIN — ONDANSETRON 4 MG: 2 INJECTION INTRAMUSCULAR; INTRAVENOUS at 08:43

## 2020-06-05 RX ADMIN — HEPARIN SODIUM 4000 UNITS: 1000 INJECTION, SOLUTION INTRAVENOUS; SUBCUTANEOUS at 09:15

## 2020-06-05 RX ADMIN — FUROSEMIDE 20 MG: 10 INJECTION, SOLUTION INTRAMUSCULAR; INTRAVENOUS at 10:50

## 2020-06-05 ASSESSMENT — PULMONARY FUNCTION TESTS
PIF_VALUE: 3
PIF_VALUE: 18
PIF_VALUE: 16
PIF_VALUE: 19
PIF_VALUE: 16
PIF_VALUE: 1
PIF_VALUE: 17
PIF_VALUE: 15
PIF_VALUE: 18
PIF_VALUE: 16
PIF_VALUE: 15
PIF_VALUE: 16
PIF_VALUE: 18
PIF_VALUE: 16
PIF_VALUE: 16
PIF_VALUE: 1
PIF_VALUE: 18
PIF_VALUE: 21
PIF_VALUE: 19
PIF_VALUE: 16
PIF_VALUE: 1
PIF_VALUE: 16
PIF_VALUE: 0
PIF_VALUE: 1
PIF_VALUE: 3
PIF_VALUE: 16
PIF_VALUE: 16
PIF_VALUE: 17
PIF_VALUE: 19
PIF_VALUE: 16
PIF_VALUE: 1
PIF_VALUE: 0
PIF_VALUE: 16
PIF_VALUE: 19
PIF_VALUE: 1
PIF_VALUE: 17
PIF_VALUE: 18
PIF_VALUE: 17
PIF_VALUE: 0
PIF_VALUE: 0
PIF_VALUE: 4
PIF_VALUE: 17
PIF_VALUE: 19
PIF_VALUE: 0
PIF_VALUE: 16
PIF_VALUE: 18
PIF_VALUE: 16
PIF_VALUE: 16
PIF_VALUE: 1
PIF_VALUE: 16
PIF_VALUE: 19
PIF_VALUE: 0
PIF_VALUE: 18
PIF_VALUE: 16
PIF_VALUE: 16
PIF_VALUE: 17
PIF_VALUE: 16
PIF_VALUE: 18
PIF_VALUE: 16
PIF_VALUE: 16
PIF_VALUE: 18
PIF_VALUE: 18
PIF_VALUE: 16
PIF_VALUE: 3
PIF_VALUE: 16
PIF_VALUE: 17
PIF_VALUE: 20
PIF_VALUE: 18
PIF_VALUE: 16
PIF_VALUE: 17
PIF_VALUE: 16
PIF_VALUE: 2
PIF_VALUE: 17
PIF_VALUE: 17
PIF_VALUE: 19
PIF_VALUE: 16
PIF_VALUE: 18
PIF_VALUE: 17
PIF_VALUE: 3
PIF_VALUE: 18
PIF_VALUE: 17
PIF_VALUE: 1
PIF_VALUE: 16
PIF_VALUE: 1
PIF_VALUE: 16
PIF_VALUE: 1
PIF_VALUE: 3
PIF_VALUE: 18
PIF_VALUE: 18
PIF_VALUE: 1
PIF_VALUE: 18
PIF_VALUE: 16
PIF_VALUE: 19
PIF_VALUE: 16
PIF_VALUE: 19
PIF_VALUE: 3
PIF_VALUE: 17
PIF_VALUE: 18
PIF_VALUE: 16
PIF_VALUE: 3
PIF_VALUE: 17
PIF_VALUE: 17
PIF_VALUE: 16
PIF_VALUE: 18
PIF_VALUE: 19
PIF_VALUE: 19
PIF_VALUE: 16
PIF_VALUE: 19
PIF_VALUE: 16
PIF_VALUE: 18
PIF_VALUE: 19
PIF_VALUE: 16
PIF_VALUE: 0
PIF_VALUE: 16
PIF_VALUE: 16
PIF_VALUE: 18
PIF_VALUE: 4
PIF_VALUE: 17
PIF_VALUE: 5
PIF_VALUE: 16
PIF_VALUE: 17
PIF_VALUE: 16
PIF_VALUE: 16
PIF_VALUE: 26
PIF_VALUE: 17
PIF_VALUE: 18
PIF_VALUE: 3
PIF_VALUE: 0
PIF_VALUE: 18
PIF_VALUE: 19
PIF_VALUE: 3
PIF_VALUE: 26
PIF_VALUE: 2
PIF_VALUE: 16
PIF_VALUE: 3
PIF_VALUE: 19
PIF_VALUE: 16
PIF_VALUE: 17
PIF_VALUE: 4
PIF_VALUE: 18
PIF_VALUE: 19
PIF_VALUE: 18
PIF_VALUE: 3
PIF_VALUE: 19
PIF_VALUE: 30
PIF_VALUE: 18
PIF_VALUE: 20
PIF_VALUE: 19
PIF_VALUE: 18
PIF_VALUE: 17
PIF_VALUE: 16
PIF_VALUE: 3

## 2020-06-05 ASSESSMENT — LIFESTYLE VARIABLES: SMOKING_STATUS: 1

## 2020-06-05 NOTE — PRE SEDATION
DO   melatonin 1 MG tablet Take 3 tablets by mouth nightly as needed for Sleep 4/29/20  Yes Charles Davalos DO   pantoprazole (PROTONIX) 40 MG tablet Take 1 tablet by mouth every morning (before breakfast) 4/29/20  Yes Charles Davalos DO   sotalol (BETAPACE) 120 MG tablet Take 1 tablet by mouth 2 times daily 4/29/20  Yes Charles Davalos DO   nitroGLYCERIN (NITROSTAT) 0.4 MG SL tablet up to max of 3 total doses. If no relief after 1 dose, call 911. 4/29/20   Rai Davalos DO   bisacodyl (DULCOLAX) 5 MG EC tablet Take 1 tablet by mouth daily 4/29/20   Charles Davalos DO     Coumadin Use Last 7 Days:  no  Antiplatelet drug therapy use last 7 days: no  Other anticoagulant use last 7 days: yes -Eliquis  Additional Medication Information: Sotalol      Pre-Sedation Documentation and Exam:   I have personally completed a history, physical exam & review of systems for this patient (see notes). Vital signs have been reviewed (see flow sheet for vitals).     Mallampati Airway Assessment:  Mallampati Class I - (soft palate, fauces, uvula & anterior/posterior tonsillar pillars are visible)    Prior History of Anesthesia Complications:   none    ASA Classification:  Class 2 - A normal healthy patient with mild systemic disease    Sedation/ Anesthesia Plan:   intravenous sedation    Medications Planned:   Per anesthesia team    Patient is an appropriate candidate for plan of sedation: yes    Electronically signed by Joy Magdaleno MD on 6/5/2020 at 2:35 PM

## 2020-06-05 NOTE — H&P
[address]   Cardiac Electrophysiology H&P  Date: 6/5/2020  Admit Date:  6/5/2020  Reason for admission: Atrial fibrillation ablation  Consult Requesting Physician: No att. providers found     No chief complaint on file. HPI: Odalys Javier is a 61 y.o. female with a past medical history significant for CAD, status post PCI, hypertension, hyperlipidemia, ovarian cancer, status post resection of the pelvic mass, complete hysterectomy who developed highly symptomatic paroxysmal atrial fibrillation and atrial flutter during the last hospital admission. At the time, she underwent atrial flutter ablation. She was also initiated on sotalol for rhythm control strategy. As the patient have breakthroughs in spite of being on medications, she was recommended to go through atrial fibrillation ablation. Today, she is here for atrial fibrillation ablation. She is on Eliquis 5 mg p.o. twice daily. She had missed any dose of Eliquis for the past 4 weeks. Past Medical History:   Diagnosis Date    Cancer [facility])     ovary    Coronary artery disease 2008    2 stents,     Heart disease     Hyperlipemia     Hypertension     MI, old 2008    2 stents, requiered another stent in 2009         Past Surgical History:   Procedure Laterality Date   [address]  2008, 2009    stents X3    HYSTERECTOMY Bilateral 4/8/2020    DIAGNOSTIC LAPAROSCOPY,; LAPAROTOMY RESECTION OF A PELVIC MASS, COMPLETE HYSTERECTOMY, BILATERAL SALPINGO-OOPHORECTOMY, PELVIC LYMPHADENECTOMY,  OMENTECTOMY, APPENDECTOMY; RADICAL PERIMETRECTOMY; TUMOR DEBULKING performed by Afshan Lindsay DO at [facility] N/A 5/11/2020    INFUSAPORT PLACEMENT UNDER FLUOROSCOPIC GUIDANCE  CPT CODE - 20608-50 performed by Afshan Lindsay DO at [address] [place] [address]  2008    TUBAL LIGATION         No Known Allergies    Social History:  Reviewed. reports that she has been smoking cigarettes. She has been smoking about 0.50 packs per day.  She has rigidity. No JVD present. · Cardiovascular: Normal rate, regular rhythm. Normal S1&S2. Carotid pulse 2+ bilaterally. · Pulmonary/Chest: Bilateral respiratory sounds present. No respiratory accessory muscle use. No wheezes, No rhonchi. · Abdominal: Soft. Normal bowel sounds present. No distension, No tenderness. No splenomegaly. No hernia. · Musculoskeletal: No tenderness. No edema    · Lymphadenopathy: Has no cervical adenopathy. · Neurological: Alert and oriented. Cranial nerve II-XII grossly intact, No gross deficit to touch. · Skin: Skin is warm and dry. No rash, lesions, ulcerations noted. · Psychiatric: No anxiety nor agitation. Labs:  Reviewed. No results for input(s): NA, K, CL, CO2, PHOS, BUN, CREATININE in the last 72 hours. Invalid input(s): CA  Recent Labs     06/05/20  0725   WBC 8.0   HGB 8.9*   HCT 27.0*   MCV 88.0        Lab Results   Component Value Date    TROPONINI <0.01 04/16/2020     No results found for: BNP  Lab Results   Component Value Date    PROTIME 18.0 06/02/2020    PROTIME 15.7 04/20/2020    PROTIME 18.1 04/17/2020    INR 1.54 06/02/2020    INR 1.35 04/20/2020    INR 1.55 04/17/2020     Lab Results   Component Value Date    CHOL 175 05/22/2014    HDL 46 05/22/2014    HDL 54 03/30/2011    TRIG 174 05/22/2014       Diagnostic and imaging results reviewed. ECG: Normal sinus  Echo: Normal    I independently reviewed the ECG and telemetry.      Scheduled Meds:   sodium chloride flush  10 mL Intravenous 2 times per day     Continuous Infusions:   sodium chloride      lactated ringers       PRN Meds:.sodium chloride flush, fentanNYL, fentanNYL, HYDROmorphone, HYDROmorphone, oxyCODONE-acetaminophen, ondansetron, promethazine, labetalol, hydrALAZINE     Assessment:   Patient Active Problem List    Diagnosis Date Noted    Atrial fibrillation with RVR ([facility] 04/16/2020    Debility 04/15/2020    Ovarian cancer on left ([facility] 04/08/2020    Coronary artery disease 10/09/2012    Hyperlipidemia 10/09/2012    HTN (hypertension) 10/09/2012    Alcohol abuse 10/09/2012    Tobacco abuse 10/09/2012      There are no active hospital problems to display for this patient. Recommendation (s):       1. Highly symptomatic paroxysmal atrial fibrillation  2. Typical atrial flutter  3. CAD, status post PCI  4. Hypertension  5. Hyperlipidemia  6. Ovarian cancer, status post surgery    Her blood works showed platelet count of 60 7K. Hence, her platelet once were repeated this morning and the repeat count was 150 7K. Hence, we decided to proceed with atrial fibrillation ablation. We educated the patient that atrial fibrillation and atrial flutter are both progressive diseases, with more frequent episodes that will ensue. Subsequent episodes usually become more sustained to the extent that many individuals would then develop persistent atrial fibrillation/atrial flutter. Once persistence is reached, permanent atrial dysrhythmia is inevitable. Treatment options including cardioversion, anti-arrhythmic medication therapy, rate control strategy, oral anticoagulation, and atrial fibrillation ablation were discussed with patient. Risks, benefits and alternative of each treatment options were explained. We discussed different management options for both atrial tachydysrhythmia including their risks and benefits. These options include use of cardioversion (mainly for persisting atrial fibrillation or atrial flutter) which provides an effective immediate therapy with success rates of 75% or higher, but it provides no short nor long term efficacy.   Anti-arrhythmic medications provide a very effective short term therapy, but even with our most potent anti-arrhythmic medication there is limited long term efficacy (clinical studies have shown that 40% of patients remain atrial fibrillation-free after 4 years of follow-up after starting one of the more powerful anti-arrhythmic

## 2020-06-05 NOTE — PROCEDURES
noted.    Conclusion:     - Pre- and post-procedure diagnoses were paroxysmal atrial fibrillation   - Pulmonary vein isolations using wide area circumferential radiofrequency ablation   - Kirstie ablations on each antral ablation  - No dormant conduction with Adenosine  - Isuprel infusion, with an attempt to induce arrhythmia    Plan:  Resume [address] with Eliquis  Continue PPI   Continue sotalol for 3 months. Lasix 20 mg IV given in the lab  Figure-of-eight suture was placed on both groins  Bedrest for 4 hours  Remove figure-of-eight suture after 2 hours of completion of bedrest  D/C later today, if stable    Thank you for allowing us to participate in the care of your patient. If you have any questions or concerns, please do not hesitate to contact me.     Perla Mccord MD   Cardiac Electrophysiology  [address] [place] [address] [phone]  [facility]

## 2020-06-06 ENCOUNTER — HOSPITAL ENCOUNTER (EMERGENCY)
Age: 60
Discharge: HOME | End: 2020-06-06
Attending: EMERGENCY MEDICINE
Payer: COMMERCIAL

## 2020-06-06 VITALS
RESPIRATION RATE: 18 BRPM | SYSTOLIC BLOOD PRESSURE: 135 MMHG | HEART RATE: 88 BPM | TEMPERATURE: 98.5 F | OXYGEN SATURATION: 100 % | DIASTOLIC BLOOD PRESSURE: 74 MMHG

## 2020-06-06 PROCEDURE — 6360000002 HC RX W HCPCS: Performed by: EMERGENCY MEDICINE

## 2020-06-06 PROCEDURE — 99282 EMERGENCY DEPT VISIT SF MDM: CPT

## 2020-06-06 RX ORDER — HEPARIN SODIUM (PORCINE) LOCK FLUSH IV SOLN 100 UNIT/ML 100 UNIT/ML
100 SOLUTION INTRAVENOUS PRN
Status: DISCONTINUED | OUTPATIENT
Start: 2020-06-06 | End: 2020-06-06 | Stop reason: HOSPADM

## 2020-06-06 RX ADMIN — Medication 100 UNITS: at 07:42

## 2020-06-06 NOTE — ED PROVIDER NOTES
[address] [facility] ENCOUNTER          ATTENDING PHYSICIAN NOTE       Date of evaluation: 6/6/2020    Chief Complaint     Vascular Access Problem (pt reports that after her procedure yesterday they did not deaccess her port)      History of Present Illness     Narinder Pedersen is a 61 y.o. female  with a past medical history significant for CAD, status post PCI, hypertension, hyperlipidemia, ovarian cancer, status post resection of the pelvic mass, complete hysterectomy and most recently symptomatic paroxysmal atrial fibrillation and atrial flutter s/p ablation yesterday who presents requesting that herport be de-accessed. The patient has a left subclavian port that they accessed yesterday for the procedure but appeared to be forgotten at the time of discharge. He remains intact here and she is requesting that it be de-accessed. Review of Systems     Review of Systems   Constitutional: Negative for fever. Cardiovascular: Negative for chest pain. All other systems reviewed and are negative. Past Medical, Surgical, Family, and Social History     She has a past medical history of Cancer [facility]), Coronary artery disease, Heart disease, Hyperlipemia, Hypertension, and MI, old. She has a past surgical history that includes Tubal ligation; Cardiac surgery (2008, 2009); sinus surgery (2008); Hysterectomy (Bilateral, 4/8/2020); and Port Surgery (N/A, 5/11/2020). Her family history includes Heart Disease in her father and mother; High Blood Pressure in her father; Stroke in her paternal grandmother. She reports that she has been smoking cigarettes. She has been smoking about 0.50 packs per day. She has never used smokeless tobacco. She reports that she does not drink alcohol or use drugs.     Medications     Previous Medications    APIXABAN (ELIQUIS) 5 MG TABS TABLET    Take 1 tablet by mouth 2 times daily    ATORVASTATIN (LIPITOR) 40 MG TABLET    Take 1 tablet by mouth daily    BISACODYL

## 2020-06-08 ENCOUNTER — CARE COORDINATION (OUTPATIENT)
Dept: CARE COORDINATION | Age: 60
End: 2020-06-08

## 2020-06-08 LAB
EKG ATRIAL RATE: 54 BPM
EKG DIAGNOSIS: NORMAL
EKG P AXIS: 52 DEGREES
EKG P-R INTERVAL: 134 MS
EKG Q-T INTERVAL: 518 MS
EKG QRS DURATION: 76 MS
EKG QTC CALCULATION (BAZETT): 491 MS
EKG R AXIS: 51 DEGREES
EKG T AXIS: 74 DEGREES
EKG VENTRICULAR RATE: 54 BPM

## 2020-06-08 PROCEDURE — 93010 ELECTROCARDIOGRAM REPORT: CPT | Performed by: INTERNAL MEDICINE

## 2020-06-15 ENCOUNTER — OFFICE VISIT (OUTPATIENT)
Dept: CARDIOLOGY CLINIC | Age: 60
End: 2020-06-15
Payer: COMMERCIAL

## 2020-06-15 VITALS
WEIGHT: 177.4 LBS | HEART RATE: 81 BPM | DIASTOLIC BLOOD PRESSURE: 62 MMHG | TEMPERATURE: 97.8 F | SYSTOLIC BLOOD PRESSURE: 112 MMHG | BODY MASS INDEX: 26.2 KG/M2

## 2020-06-15 PROCEDURE — 93000 ELECTROCARDIOGRAM COMPLETE: CPT | Performed by: NURSE PRACTITIONER

## 2020-06-15 PROCEDURE — 3017F COLORECTAL CA SCREEN DOC REV: CPT | Performed by: NURSE PRACTITIONER

## 2020-06-15 PROCEDURE — G8427 DOCREV CUR MEDS BY ELIG CLIN: HCPCS | Performed by: NURSE PRACTITIONER

## 2020-06-15 PROCEDURE — G8419 CALC BMI OUT NRM PARAM NOF/U: HCPCS | Performed by: NURSE PRACTITIONER

## 2020-06-15 PROCEDURE — 4004F PT TOBACCO SCREEN RCVD TLK: CPT | Performed by: NURSE PRACTITIONER

## 2020-06-15 PROCEDURE — 99214 OFFICE O/P EST MOD 30 MIN: CPT | Performed by: NURSE PRACTITIONER

## 2020-06-15 NOTE — PROGRESS NOTES
melana, constipation, diarrhea, or history of GI ulcers. Genito-Urinary: No dysuria or hematuria. No urgency or polyuria. Musculoskeletal:  No complaints of joint pain, joint swelling or muscular weakness/soreness. Neurological:  No dizziness, headaches, numbness/tingling, speech problems or weakness. No history of a stroke or TIA. Psychological:  No anxiety or depression. Hematological and Lymphatic: No abnormal bleeding or bruising, blood clots, jaundice or swollen lymph nodes. Endocrine:   No malaise/lethargy, palpitations, polydipsia/polyuria, temperature intolerance or unexpected weight changes  Skin:  No rashes or non-healing ulcers. Physical Exam:  /62   Pulse 81   Temp 97.8 °F (36.6 °C)   Wt 177 lb 6.4 oz (80.5 kg)   BMI 26.20 kg/m²    General (appearance):  No acute distress  Eyes: anicteric   Neck: soft, No JVD  Ears/Nose/Mouth/Thorat: No cyanosis  CV: RRR   Respiratory:  Clear, normal effort  GI: soft, non-tender, non-distended  Skin: Warm, dry. No rashes  Neuro/Psych: Alert and oriented x 3. Appropriate behavior  Ext:  No c/c. NO edema  Pulses:  2+ bilataeral femoral. Bilateral groins soft, + ecchymosis.      Weight  Wt Readings from Last 3 Encounters:   06/15/20 177 lb 6.4 oz (80.5 kg)   06/05/20 184 lb (83.5 kg)   05/11/20 174 lb (78.9 kg)          CBC:   Lab Results   Component Value Date    WBC 8.0 06/05/2020    HGB 8.9 (L) 06/05/2020    HCT 27.0 (L) 06/05/2020    MCV 88.0 06/05/2020     06/05/2020     BMP:  Lab Results   Component Value Date    CREATININE 0.5 (L) 06/02/2020    BUN 7 06/02/2020     06/02/2020    K 4.4 06/02/2020     06/02/2020    CO2 25 06/02/2020     Mag:   Lab Results   Component Value Date    MG 1.80 04/21/2020     LIVER PROFILE:   Lab Results   Component Value Date    ALT 13 04/15/2020    AST 21 04/15/2020    ALKPHOS 90 04/15/2020    BILITOT 0.3 04/15/2020     PT/INR:   Lab Results   Component Value Date    INR 1.54 (H) 06/02/2020    INR 1.35 (H) 2020    INR 1.55 (H) 2020    PROTIME 18.0 (H) 2020    PROTIME 15.7 (H) 2020    PROTIME 18.1 (H) 2020     Pro-BNP No results found for: PROBNP  LIPIDS:  No components found for: CHLPL  Lab Results   Component Value Date    TRIG 174 (H) 2014    TRIG 174 (H) 04/15/2014    TRIG 177 (H) 2013     Lab Results   Component Value Date    HDL 46 2014    HDL 45 04/15/2014    HDL 47 2013     Lab Results   Component Value Date    LDLCALC 94 2014    LDLCALC 131 (H) 04/15/2014    LDLCALC 196 (H) 2013     Lab Results   Component Value Date    LABVLDL 35 2014    LABVLDL 35 04/15/2014    LABVLDL 35 2013     TSH:  Lab Results   Component Value Date    TSH 1.72 2014       IMAGIN2020 TARIQ:   Normal left ventricle size, wall thickness, and systolic function with an   estimated ejection fraction of 55-60%. No regional wall motion abnormalities   are noted. An tiny echogenic structure was seen attached on the ventricular aspect of   the anterior leaflet of mitral valve, likely a torn corda tendinae. Mild   mitral regurgitation is present. No evidence of mitral valve stenosis. Left atrial size is normal. There is no evidence of mass or thrombus in the   left atrium or appendage. A bubble study was performed and showed no   evidence of right to left shunting. 2020 Nuc stress:     Radha Nba is normal isotope uptake at stress and rest. There is no evidence of    myocardial ischemia or scar.    The LVEF is normal at greater than 70% with normal LV wall motion.        This is a low risk cardiac scan. 2020 Echo:   Normal left ventricle size, wall thickness, and systolic function with an   estimated ejection fraction of 55-60%. No regional wall motion abnormalities   are seen. Diastolic filling parameters suggests normal diastolic function. Mitral annular calcification is present.  Anterior mitral valve leaflet   appears

## 2020-07-15 ENCOUNTER — OFFICE VISIT (OUTPATIENT)
Dept: CARDIOLOGY CLINIC | Age: 60
End: 2020-07-15
Payer: COMMERCIAL

## 2020-07-15 VITALS
DIASTOLIC BLOOD PRESSURE: 70 MMHG | TEMPERATURE: 97.5 F | WEIGHT: 175.4 LBS | SYSTOLIC BLOOD PRESSURE: 110 MMHG | HEART RATE: 72 BPM | BODY MASS INDEX: 25.9 KG/M2

## 2020-07-15 PROCEDURE — 99214 OFFICE O/P EST MOD 30 MIN: CPT | Performed by: NURSE PRACTITIONER

## 2020-07-15 PROCEDURE — 3017F COLORECTAL CA SCREEN DOC REV: CPT | Performed by: NURSE PRACTITIONER

## 2020-07-15 PROCEDURE — 4004F PT TOBACCO SCREEN RCVD TLK: CPT | Performed by: NURSE PRACTITIONER

## 2020-07-15 PROCEDURE — G8419 CALC BMI OUT NRM PARAM NOF/U: HCPCS | Performed by: NURSE PRACTITIONER

## 2020-07-15 PROCEDURE — 93000 ELECTROCARDIOGRAM COMPLETE: CPT | Performed by: NURSE PRACTITIONER

## 2020-07-15 PROCEDURE — G8427 DOCREV CUR MEDS BY ELIG CLIN: HCPCS | Performed by: NURSE PRACTITIONER

## 2020-07-15 RX ORDER — SOTALOL HYDROCHLORIDE 120 MG/1
120 TABLET ORAL 2 TIMES DAILY
Qty: 60 TABLET | Refills: 2 | Status: SHIPPED
Start: 2020-07-15 | End: 2020-11-24 | Stop reason: ALTCHOICE

## 2020-07-15 NOTE — PROGRESS NOTES
[address]  Office Visit           Grabiel Beverly MD,  Didier Ernst APRCHEKO FN [address] [employer]       Cardiology             Durwin Dance Plavs  1960    July 15, 2020    Primary Cardiologist:  Dr. Ed Johnson      CC: Paroxysmal Atrial Fibrillation    Interval Hx: Tinnie Schirmer is a 61 y.o female with a pmhx of , CAD s/p PCI x 2 with 3 stents, HTN, HLD and ovarian cancer s/p resection of pelvic mass and complete hysterectomy who developed symptomatic AF/AFL during past hospital admission. Pt was started on sotalol at that time for rhythm control, d/t continued breakthrough of AF/AFL with medication, pt was recommended to undergo an ablation. Pt underwent Af ablation on 6/5/20. Pt presents today in Sinus rhythm with a HR of 72. Pt c/o tiredness that she attributes to chemotherapy treatments. Pt denies CP, palpitations, SOB, cough, nausea, vomiting. Pt denies easy bruising, dark tarry stools, noted a drop in hgb suspected to be be attributed to ovarian cancer/chemotherapy treatment. EKG 7/15/20  HR 72, , QT/QTc 404/425, QRS 98    Echo 4/13/20  Summary   Normal left ventricle size, wall thickness, and systolic function with an estimated ejection fraction of 55-60%. No regional wall motion abnormalities are seen. Diastolic filling parameters suggests normal diastolic function. Mitral annular calcification is present. Anterior mitral valve leaflet appears thickened. Mild mitral regurgitation is present. TARIQ 4/21/20   Summary   Normal left ventricle size, wall thickness, and systolic function with an estimated ejection fraction of 55-60%. No regional wall motion abnormalities are noted. An tiny echogenic structure was seen attached on the ventricular aspect of the anterior leaflet of mitral valve, likely a torn corda tendinae. Mild mitral regurgitation is present. No evidence of mitral valve stenosis.  Left atrial size is normal. There is no evidence of mass or 3 Encounters:   07/15/20 72   06/15/20 81   06/06/20 88     Wt Readings from Last 3 Encounters:   07/15/20 175 lb 6.4 oz (79.6 kg)   06/15/20 177 lb 6.4 oz (80.5 kg)   06/05/20 184 lb (83.5 kg)     Constitutional: She is oriented to person, place, and time. She appears well-developed and well-nourished. In no acute distress. HEENT: Normocephalic and atraumatic. Sclerae anicteric. No xanthelasmas. Conjunctiva white, no subconjunctival hemorrhage   External inspection of ears nose teeth & gums   Eyes:PERRLA EOM's intact. Neck: Neck supple. No JVD present. Carotids without bruits. No mass and no thyromegaly present. No lymphadenopathy present. Cardiovascular: RRR, normal S1 and S2; no murmur/gallop or rub, PMI nondisplaced  Pulmonary/Chest: Effort normal.  Lungs clear to auscultation. Chest wall nontender  Abdominal: soft, nontender, nondistended. + bowel sounds; no organomegaly or bruits. Aorta normal  Extremities: No edema, cyanosis, or clubbing. Pulses are 2+ radial/carotid/dorsalis pedis bilaterally. Cap refill brisk. Neurological: No cranial nerve deficit. Psychiatric: She has a normal mood and affect.  Her speech is normal and behavior is normal.     Lab Review:   Lab Results   Component Value Date    TRIG 174 05/22/2014    HDL 46 05/22/2014    HDL 54 03/30/2011    LDLCALC 94 05/22/2014    LABVLDL 35 05/22/2014     Lab Results   Component Value Date     06/02/2020    K 4.4 06/02/2020    K 4.5 04/29/2020     06/02/2020    CO2 25 06/02/2020    BUN 7 06/02/2020    CREATININE 0.5 06/02/2020    GLUCOSE 95 06/02/2020    CALCIUM 8.6 06/02/2020      Lab Results   Component Value Date    WBC 8.0 06/05/2020    HGB 8.9 (L) 06/05/2020    HCT 27.0 (L) 06/05/2020    MCV 88.0 06/05/2020     06/05/2020         I have reviewed any available labs, images, any stress test, [facility] on this pt         Assessment:      Paroxysmal Atrial Fibrillation  S/p AF Ablation on 6/5/20  In Sinus rhythm today  Continue

## 2020-07-15 NOTE — PATIENT INSTRUCTIONS
Will continue Sotalol for 3 months s/p ablation on 6/5/20  Will discontinue PPI at this time   Obtain Blood work prior to visit with Dr. Evelin Flores with Dr. Candie Cortes in 3 months  FU with Sharon Cha, DORYS/Dr. Niyah Staley in 6 months

## 2020-09-28 ENCOUNTER — HOSPITAL ENCOUNTER (OUTPATIENT)
Dept: CT IMAGING | Age: 60
Discharge: HOME | End: 2020-09-28
Payer: COMMERCIAL

## 2020-09-28 PROCEDURE — 6360000004 HC RX CONTRAST MEDICATION: Performed by: OBSTETRICS & GYNECOLOGY

## 2020-09-28 PROCEDURE — 74177 CT ABD & PELVIS W/CONTRAST: CPT

## 2020-09-28 RX ADMIN — IOPAMIDOL 75 ML: 755 INJECTION, SOLUTION INTRAVENOUS at 08:35

## 2020-09-28 RX ADMIN — IOHEXOL 50 ML: 240 INJECTION, SOLUTION INTRATHECAL; INTRAVASCULAR; INTRAVENOUS; ORAL at 08:27

## 2020-11-11 NOTE — PROGRESS NOTES
[address]   Cardiac Electrophysiology Consultation   Date: 11/24/2020  Reason for Consultation:  AF  Consult Requesting Physician: No att. providers found     Chief Complaint:   Chief Complaint   Patient presents with    Atrial Fibrillation      HPI: Katlyn Vanegas is a 61 y.o. female with a PMH significant for CAD, s/p PCI, HTN, HLD, ovarian cancer, s/p resection of the pelvic mass, complete hysterectomy who developed highly symptomatic PAF/AFL during hospitalization in 4/2020. At the time, she underwent RFA of cavotricuspid isthmus dependent right AFL (4/21/20). She was also initiated on sotalol for rhythm control strategy at that time. Due to recurrence in spite of being on medications, she is s/p RFA/PVI of AF on 6/5/20. Interval History: Today, she is here for 5 month f/u after AF ablation, presenting in [place]. She feels better since the ablation. Denies SOB, palpitations, chest discomfort, eating difficulties, odynophagia, fever or chills since the ablation. She denies any side effects from her medications. She continues to smoke. Atrial Fibrillation:    BMI    :   Body mass index is 24.31 kg/m².     Duration   :   4/2020    Symptoms   :   Shortness of breath, palpitations, easy fatigability, dyspnea on exertion, decline in his functional capacity    Previous DCCV :   none    Previous AAD           :   Sotalol started 4/2020    Beta blocker  :   Sotalol    ACE / ARB  :   none    OAC   :   Eliquis    LVEF    :   55-60%    Left atrial size :   3.4 cm    MART   :   Not tested    Past Medical History:   Diagnosis Date    Cancer ([address]     ovary    Coronary artery disease 2008    2 stents,     Heart disease     Hyperlipemia     Hypertension     MI, old 2008    2 stents, requiered another stent in 2009         Past Surgical History:   Procedure Laterality Date   [address]  2008, 2009    stents X3    HYSTERECTOMY Bilateral 4/8/2020    DIAGNOSTIC LAPAROSCOPY,; LAPAROTOMY RESECTION OF A PELVIC MASS, COMPLETE HYSTERECTOMY, BILATERAL SALPINGO-OOPHORECTOMY, PELVIC LYMPHADENECTOMY,  OMENTECTOMY, APPENDECTOMY; RADICAL PERIMETRECTOMY; TUMOR DEBULKING performed by Reynaldo Rodrigues DO at [facility] N/A 5/11/2020    INFUSAPORT PLACEMENT UNDER FLUOROSCOPIC GUIDANCE  CPT CODE - 28059-88 performed by Reynaldo Rodrigues DO at [address] [address]    TUBAL LIGATION         Allergies:  No Known Allergies    Medication:   Prior to Admission medications    Medication Sig Start Date End Date Taking? Authorizing Provider   metoprolol succinate (TOPROL XL) 25 MG extended release tablet  9/25/20  Yes Historical Provider, MD   ELIQUIS 5 MG TABS tablet TAKE ONE TABLET BY MOUTH TWICE A DAY 11/20/20  Yes Donnal Prudent, APRN - CNP   methocarbamol (ROBAXIN) 750 MG tablet Take 750 mg by mouth 4 times daily as needed   Yes Historical Provider, MD   sertraline (ZOLOFT) 50 MG tablet Take 50 mg by mouth daily   Yes Historical Provider, MD   nitroGLYCERIN (NITROSTAT) 0.4 MG SL tablet up to max of 3 total doses. If no relief after 1 dose, call 911. 4/29/20  Yes Charles Davalos DO   atorvastatin (LIPITOR) 40 MG tablet Take 1 tablet by mouth daily 4/30/20  Yes Charles Davalos DO   bisacodyl (DULCOLAX) 5 MG EC tablet Take 1 tablet by mouth daily 4/29/20  Yes Charles Davalos DO   melatonin 1 MG tablet Take 3 tablets by mouth nightly as needed for Sleep 4/29/20  Yes Charles Davalos DO   gabapentin (NEURONTIN) 100 MG capsule Take 1 capsule by mouth 3 times daily for 90 days. 4/29/20 7/28/20  Zheng Davalos DO       Social History:   reports that she has been smoking cigarettes. She has been smoking about 0.50 packs per day. She has never used smokeless tobacco. She reports that she does not drink alcohol or use drugs. Family History:  family history includes Heart Disease in her father and mother; High Blood Pressure in her father; Stroke in her paternal grandmother. Reviewed.  Denies family history of sudden cardiac death, arrhythmia, premature CAD    Review of System:    · General ROS: negative for - chills, fever   · Psychological ROS: negative for - anxiety or depression  · Ophthalmic ROS: negative for - eye pain or loss of vision  · ENT ROS: negative for - epistaxis, headaches, nasal discharge, sore throat   · Allergy and Immunology ROS: negative for - hives, nasal congestion   · Hematological and Lymphatic ROS: negative for - bleeding problems, blood clots, bruising or jaundice  · Endocrine ROS: negative for - skin changes, temperature intolerance or unexpected weight changes  · Respiratory ROS: negative for - cough, hemoptysis, pleuritic pain, SOB, sputum changes or wheezing  · Cardiovascular ROS: Per HPI. · Gastrointestinal ROS: negative for - abdominal pain, blood in stools, diarrhea, hematemesis, melena, nausea/vomiting or swallowing difficulty/pain  · Genito-Urinary ROS: negative for - dysuria or incontinence  · Musculoskeletal ROS: negative for - joint swelling or muscle pain  · Neurological ROS: negative for - confusion, dizziness, gait disturbance, headaches, numbness/tingling, seizures, speech problems, tremors, visual changes or weakness  · Dermatological ROS: negative for - rash    Physical Examination:  Vitals:    11/24/20 1109   BP: 136/70   Pulse: 86       · Constitutional: Oriented. No distress. · Head: Normocephalic and atraumatic. · Mouth/Throat: Oropharynx is clear and moist.   · Eyes: Conjunctivae normal. EOM are normal.   · Neck: Normal range of motion. Neck supple. No rigidity. No JVD present. · Cardiovascular: Normal rate, regular rhythm, S1&S2 and intact distal pulses. · Pulmonary/Chest: Bilateral respiratory sounds. No wheezes. No rhonchi. · Abdominal: Soft. Bowel sounds present. No distension, No tenderness. · Musculoskeletal: No tenderness. No edema    · Lymphadenopathy: Has no cervical adenopathy. · Neurological: Alert and oriented.  Cranial nerve appears is counseled about the health hazards of smoking including cardiovascular side effects and its impact on morbidity and mortality. Follow up in 6 months with Shruthi Kuo NP    Thank you for allowing me to participate in the care of [address]. All questions and concerns were addressed to the patient/family. Alternatives to my treatment were discussed. Leeanna Hayden RN, am scribing for and in the presence of Dr. Brittany Piper. 11/24/20 11:16 AM  ANNIA Avilez MD, personally performed the services prescribed in this documentation as scribed by Ms. Thai Fletcher RN in my presence and it is both accurate and complete.        Frandy Vázquez MD  Cardiac Electrophysiology  [facility]

## 2020-11-20 RX ORDER — APIXABAN 5 MG/1
TABLET, FILM COATED ORAL
Qty: 60 TABLET | Refills: 5 | Status: SHIPPED | OUTPATIENT
Start: 2020-11-20 | End: 2021-05-27

## 2020-11-20 NOTE — TELEPHONE ENCOUNTER
Requested Prescriptions     Pending Prescriptions Disp Refills    ELIQUIS 5 MG TABS tablet [Pharmacy Med Name: ELIQUIS 5 MG TABLET] 60 tablet 1     Sig: TAKE ONE TABLET BY MOUTH TWICE A DAY          Number: 90    Refills: 3    Last Office Visit: 7/15/2020     Next Office Visit: 11/24/2020     Last Refill: 07/15/2020    Last Labs: 07/15/2020 Vitamin D/ TSH/ Phosphorus/Magnesium/ Lipid Hepatic/ CMP/ CBC

## 2020-11-24 ENCOUNTER — OFFICE VISIT (OUTPATIENT)
Dept: CARDIOLOGY CLINIC | Age: 60
End: 2020-11-24
Payer: COMMERCIAL

## 2020-11-24 VITALS
WEIGHT: 164.6 LBS | BODY MASS INDEX: 24.31 KG/M2 | HEART RATE: 86 BPM | SYSTOLIC BLOOD PRESSURE: 136 MMHG | DIASTOLIC BLOOD PRESSURE: 70 MMHG

## 2020-11-24 PROCEDURE — G8484 FLU IMMUNIZE NO ADMIN: HCPCS | Performed by: INTERNAL MEDICINE

## 2020-11-24 PROCEDURE — G8420 CALC BMI NORM PARAMETERS: HCPCS | Performed by: INTERNAL MEDICINE

## 2020-11-24 PROCEDURE — 4004F PT TOBACCO SCREEN RCVD TLK: CPT | Performed by: INTERNAL MEDICINE

## 2020-11-24 PROCEDURE — 93000 ELECTROCARDIOGRAM COMPLETE: CPT | Performed by: INTERNAL MEDICINE

## 2020-11-24 PROCEDURE — 99213 OFFICE O/P EST LOW 20 MIN: CPT | Performed by: INTERNAL MEDICINE

## 2020-11-24 PROCEDURE — 3017F COLORECTAL CA SCREEN DOC REV: CPT | Performed by: INTERNAL MEDICINE

## 2020-11-24 PROCEDURE — G8427 DOCREV CUR MEDS BY ELIG CLIN: HCPCS | Performed by: INTERNAL MEDICINE

## 2020-11-24 RX ORDER — METOPROLOL SUCCINATE 25 MG/1
TABLET, EXTENDED RELEASE ORAL
COMMUNITY
Start: 2020-09-25 | End: 2022-06-23 | Stop reason: SDUPTHER

## 2021-01-21 ENCOUNTER — OFFICE VISIT (OUTPATIENT)
Dept: CARDIOLOGY CLINIC | Age: 61
End: 2021-01-21
Payer: COMMERCIAL

## 2021-01-21 VITALS
SYSTOLIC BLOOD PRESSURE: 110 MMHG | BODY MASS INDEX: 23.95 KG/M2 | DIASTOLIC BLOOD PRESSURE: 70 MMHG | WEIGHT: 162.2 LBS | HEART RATE: 70 BPM | TEMPERATURE: 96.9 F

## 2021-01-21 DIAGNOSIS — I48.91 ATRIAL FIBRILLATION WITH RVR (HCC): ICD-10-CM

## 2021-01-21 DIAGNOSIS — I10 ESSENTIAL HYPERTENSION: Chronic | ICD-10-CM

## 2021-01-21 DIAGNOSIS — I25.10 CORONARY ARTERY DISEASE INVOLVING NATIVE CORONARY ARTERY OF NATIVE HEART WITHOUT ANGINA PECTORIS: Primary | Chronic | ICD-10-CM

## 2021-01-21 PROCEDURE — 93000 ELECTROCARDIOGRAM COMPLETE: CPT | Performed by: NURSE PRACTITIONER

## 2021-01-21 PROCEDURE — 4004F PT TOBACCO SCREEN RCVD TLK: CPT | Performed by: NURSE PRACTITIONER

## 2021-01-21 PROCEDURE — 3017F COLORECTAL CA SCREEN DOC REV: CPT | Performed by: NURSE PRACTITIONER

## 2021-01-21 PROCEDURE — 99214 OFFICE O/P EST MOD 30 MIN: CPT | Performed by: NURSE PRACTITIONER

## 2021-01-21 PROCEDURE — G8484 FLU IMMUNIZE NO ADMIN: HCPCS | Performed by: NURSE PRACTITIONER

## 2021-01-21 PROCEDURE — G8427 DOCREV CUR MEDS BY ELIG CLIN: HCPCS | Performed by: NURSE PRACTITIONER

## 2021-01-21 PROCEDURE — G8420 CALC BMI NORM PARAMETERS: HCPCS | Performed by: NURSE PRACTITIONER

## 2021-01-21 NOTE — PROGRESS NOTES
[address]  Office Visit           Debora Dugan MD,  [address]       Cardiology             Kade Antony Plavsic  1960    January 21, 2021    Primary Cardiologist:   Dr. Marilynne Cowden Hx: Kellie Page is a 61 y.o female with a pmhx of , CAD s/p PCI x 2 with 3 stents, HTN, HLD and ovarian cancer s/p resection of pelvic mass and complete hysterectomy who developed symptomatic AF/AFL during past hospital admission. Pt was started on sotalol at that time for rhythm control, d/t continued breakthrough of AF/AFL with medication, pt was recommended to undergo an ablation. Pt underwent Af ablation on 6/5/20. Pt presents today in Sinus rhythm with a HR of 72. Pt c/o tiredness that she attributes to chemotherapy treatments. Pt denies CP, palpitations, SOB, cough, nausea, vomiting. Pt denies easy bruising, dark tarry stools, noted a drop in hgb suspected to be be attributed to ovarian cancer/chemotherapy treatment. EKG 7/15/20  HR 72, , QT/QTc 404/425, QRS 98     Echo 4/13/20  Summary   Normal left ventricle size, wall thickness, and systolic function with an estimated ejection fraction of 55-60%. No regional wall motion abnormalities are seen. Diastolic filling parameters suggests normal diastolic function. Mitral annular calcification is present. Anterior mitral valve leaflet appears thickened. Mild mitral regurgitation is present.     TARIQ 4/21/20   Summary   Normal left ventricle size, wall thickness, and systolic function with an estimated ejection fraction of 55-60%. No regional wall motion abnormalities are noted. An tiny echogenic structure was seen attached on the ventricular aspect of the anterior leaflet of mitral valve, likely a torn corda tendinae. Mild mitral regurgitation is present.  No evidence of mitral valve stenosis. Left atrial size is normal. There is no evidence of mass or thrombus in the left atrium or appendage. A bubble study was performed and showed no evidence of right to left shunting.        NM Stress Test Ferd Olp) - 4/17/2020  Summary     There is normal isotope uptake at stress and rest. There is no evidence of     myocardial ischemia or scar.     The LVEF is normal at greater than 70% with normal LV wall motion.          This is a low risk cardiac scan.         Today she has c/o epigastric deep center of chest pain lasting approx an hour at rest / laid down and took gas pills and 3 nitros /one occurrence   lexiscan soon fu with me in 2-3 weeks to ED if pain reoccurs   EKG today no acute changes     Review of Systems:  Constitutional: Denies  fatigue, weakness, night sweats or fever. HEENT: Denies new visual changes, ringing in ears, nosebleeds,nasal congestion  Respiratory: Denies new or change in SOB, PND, orthopnea or cough. Cardiovascular: see HPI  GI: Denies N/V, diarrhea, constipation, abdominal pain, change in bowel habits, melena or hematochezia  : Denies urinary frequency, urgency, incontinence, hematuria or dysuria. Skin: Denies rash, hives, or cyanosis  Musculoskeletal: Denies joint or muscle aches/pain  Neurological: Denies syncope or TIA-like symptoms.   Psychiatric: Denies anxiety, insomnia or depression     Past Medical History:   Diagnosis Date    Cancer [address] [address]     ovary    Coronary artery disease 2008    2 stents,     Heart disease     Hyperlipemia     Hypertension     MI, old 2008    2 stents, requiered another stent in 2009      Past Surgical History:   Procedure Laterality Date   [address]  2008, 2009    stents X3    HYSTERECTOMY Bilateral 4/8/2020    DIAGNOSTIC LAPAROSCOPY,; LAPAROTOMY RESECTION OF A PELVIC MASS, COMPLETE HYSTERECTOMY, BILATERAL SALPINGO-OOPHORECTOMY, PELVIC LYMPHADENECTOMY,  OMENTECTOMY, APPENDECTOMY; RADICAL PERIMETRECTOMY; TUMOR DEBULKING performed by Alice Leonardo DO [address] 5/11/2020    INFUSAPORT PLACEMENT UNDER FLUOROSCOPIC GUIDANCE  CPT CODE - [id number] performed by Kapil Campbell DO at [address] [facility]  2008    TUBAL LIGATION       Family History   Problem Relation Age of Onset    Heart Disease Mother     Heart Disease Father     High Blood Pressure Father     Stroke Paternal Grandmother      Social History     Tobacco Use    Smoking status: Current Every Day Smoker     Packs/day: 0.50     Types: Cigarettes    Smokeless tobacco: Never Used    Tobacco comment: down to half a pk/day   Substance Use Topics    Alcohol use: No     Alcohol/week: 0.0 standard drinks    Drug use: No       No Known Allergies  Current Outpatient Medications   Medication Sig Dispense Refill    metoprolol succinate (TOPROL XL) 25 MG extended release tablet       ELIQUIS 5 MG TABS tablet TAKE ONE TABLET BY MOUTH TWICE A DAY 60 tablet 5    methocarbamol (ROBAXIN) 750 MG tablet Take 750 mg by mouth 4 times daily as needed      sertraline (ZOLOFT) 50 MG tablet Take 50 mg by mouth daily      nitroGLYCERIN (NITROSTAT) 0.4 MG SL tablet up to max of 3 total doses. If no relief after 1 dose, call 911. 25 tablet 3    atorvastatin (LIPITOR) 40 MG tablet Take 1 tablet by mouth daily 30 tablet 3    bisacodyl (DULCOLAX) 5 MG EC tablet Take 1 tablet by mouth daily 60 tablet 1    melatonin 1 MG tablet Take 3 tablets by mouth nightly as needed for Sleep 30 tablet 1    gabapentin (NEURONTIN) 100 MG capsule Take 1 capsule by mouth 3 times daily for 90 days. 90 capsule 2     No current facility-administered medications for this visit.         Physical Exam:   /70   Pulse 70   Temp 96.9 °F (36.1 °C)   Wt 162 lb 3.2 oz (73.6 kg)   BMI 23.95 kg/m²   BP Readings from Last 3 Encounters:   01/21/21 110/70   11/24/20 136/70   07/15/20 110/70     Pulse Readings from Last 3 Encounters:   01/21/21 70   11/24/20 86   07/15/20 72     Wt Readings from Last 3 Encounters:   01/21/21 162 lb 3.2 oz (73.6 kg)   11/24/20 164 lb 9.6 oz (74.7 kg)   07/15/20 175 lb 6.4 oz (79.6 kg)     Constitutional: She is oriented to person, place, and time. She appears well-developed and well-nourished. In no acute distress. HEENT: Normocephalic and atraumatic. Sclerae anicteric. No xanthelasmas. Conjunctiva white, no subconjunctival hemorrhage   External inspection of ears nose teeth & gums   Eyes:PERRLA EOM's intact. Neck: Neck supple. No JVD present. Carotids without bruits. No mass and no thyromegaly present. No lymphadenopathy present. Cardiovascular: RRR, normal S1 and S2; no murmur/gallop or rub, PMI nondisplaced  Pulmonary/Chest: Effort normal.  Lungs clear to auscultation. Chest wall nontender  Abdominal: soft, nontender, nondistended. + bowel sounds; no organomegaly or bruits. Aorta normal  Extremities: No edema, cyanosis, or clubbing. Pulses are 2+ radial/carotid/dorsalis pedis bilaterally. Cap refill brisk. Neurological: No cranial nerve deficit. Psychiatric: She has a normal mood and affect.  Her speech is normal and behavior is normal.     Lab Review:   Lab Results   Component Value Date    TRIG 174 05/22/2014    HDL 46 05/22/2014    HDL 54 03/30/2011    LDLCALC 94 05/22/2014    LABVLDL 35 05/22/2014     Lab Results   Component Value Date     06/02/2020    K 4.4 06/02/2020    K 4.5 04/29/2020     06/02/2020    CO2 25 06/02/2020    BUN 7 06/02/2020    CREATININE 0.5 06/02/2020    GLUCOSE 95 06/02/2020    CALCIUM 8.6 06/02/2020      Lab Results   Component Value Date    WBC 8.0 06/05/2020    HGB 8.9 (L) 06/05/2020    HCT 27.0 (L) 06/05/2020    MCV 88.0 06/05/2020     06/05/2020       I have reviewed any available labs, images, any stress test, C on this pt         Assessment:     Paroxysmal Atrial Fibrillation  S/p AF Ablation on 6/5/20  In Sinus rhythm today  Continue Eliquis 5 mg BID, off for 4 days, requires new refills, pharmacy was not able to fill - medication reordered today  Continue [id number] mg BID for 3 months from date of ablation 6/5/20  [id number] Score for Atrial Fibrillation Stroke Risk    Risk   Factors   Component Value   C CHF No 0   H HTN Yes 1   A2 Age >= 76 No,  (64 y.o.) 0   D DM No 0   S2 Prior Stroke/TIA No 0   V Vascular Disease No 0   A Age 74-69 No,  (64 y.o.) 0   Sc Sex female 1     [id number]  Score   2         CAD  S/p stents x 2 (most recent in 2009)  On Eliquis BID - prescription reordered     HLD  On Lipitor 40mg  LDL 92 in 2019     HTN  /70 - optimal     Anemia  Pt undergoing treatment for ovarian cancer  Management per Heme-Onc  Pt denies any signs of bleeding,     Plan   Today she has c/o epigastric deep center of chest pain lasting approx an hour at rest / laid down and took gas pills and 3 nitros /one occurrence   lexiscan soon fu with me in 2-3 weeks to ED if pain reoccurs   EKG today no acute changes     Patricia Luis APRN,CVNP

## 2021-01-21 NOTE — PATIENT INSTRUCTIONS
Plan   Today she has c/o epigastric deep center of chest pain lasting approx an hour at rest / laid down and took gas pills and 3 nitros /one occurrence   lexiscan soon fu with me in 2-3 weeks to ED if pain reoccurs   EKG today no acute changes

## 2021-02-02 ENCOUNTER — HOSPITAL ENCOUNTER (OUTPATIENT)
Dept: CT IMAGING | Age: 61
Discharge: HOME | End: 2021-02-02
Payer: COMMERCIAL

## 2021-02-02 DIAGNOSIS — R10.2 PELVIC PAIN IN FEMALE: ICD-10-CM

## 2021-02-02 DIAGNOSIS — R10.30 LOWER ABDOMINAL PAIN: ICD-10-CM

## 2021-02-02 DIAGNOSIS — C56.1 CANCER OF OVARY, RIGHT (HCC): ICD-10-CM

## 2021-02-02 PROCEDURE — 74177 CT ABD & PELVIS W/CONTRAST: CPT

## 2021-02-02 PROCEDURE — 6360000004 HC RX CONTRAST MEDICATION: Performed by: OBSTETRICS & GYNECOLOGY

## 2021-02-02 RX ADMIN — IOHEXOL 50 ML: 240 INJECTION, SOLUTION INTRATHECAL; INTRAVASCULAR; INTRAVENOUS; ORAL at 11:20

## 2021-02-02 RX ADMIN — IOPAMIDOL 75 ML: 755 INJECTION, SOLUTION INTRAVENOUS at 11:19

## 2021-04-07 ENCOUNTER — HOSPITAL ENCOUNTER (OUTPATIENT)
Dept: CT IMAGING | Age: 61
Discharge: HOME | End: 2021-04-07
Payer: COMMERCIAL

## 2021-04-07 DIAGNOSIS — R97.1 ELEVATED CANCER ANTIGEN 125 (CA-125): ICD-10-CM

## 2021-04-07 DIAGNOSIS — C56.1 OVARIAN CANCER ON RIGHT (HCC): ICD-10-CM

## 2021-04-07 PROCEDURE — 6360000004 HC RX CONTRAST MEDICATION: Performed by: OBSTETRICS & GYNECOLOGY

## 2021-04-07 PROCEDURE — 74177 CT ABD & PELVIS W/CONTRAST: CPT

## 2021-04-07 RX ADMIN — IOHEXOL 50 ML: 240 INJECTION, SOLUTION INTRATHECAL; INTRAVASCULAR; INTRAVENOUS; ORAL at 13:09

## 2021-04-07 RX ADMIN — IOPAMIDOL 75 ML: 755 INJECTION, SOLUTION INTRAVENOUS at 13:09

## 2021-04-28 ENCOUNTER — HOSPITAL ENCOUNTER (OUTPATIENT)
Dept: VASCULAR LAB | Age: 61
Discharge: HOME | End: 2021-04-28
Payer: COMMERCIAL

## 2021-04-28 DIAGNOSIS — M79.602 LEFT ARM PAIN: ICD-10-CM

## 2021-04-28 DIAGNOSIS — C56.1 OVARIAN CANCER ON RIGHT (HCC): ICD-10-CM

## 2021-04-28 PROCEDURE — 93971 EXTREMITY STUDY: CPT

## 2021-05-19 ENCOUNTER — HOSPITAL ENCOUNTER (OUTPATIENT)
Dept: NON INVASIVE DIAGNOSTICS | Age: 61
Discharge: HOME | End: 2021-05-19
Payer: COMMERCIAL

## 2021-05-19 DIAGNOSIS — C56.1 OVARIAN CANCER ON RIGHT (HCC): ICD-10-CM

## 2021-05-19 LAB
LV EF: 63 %
LVEF MODALITY: NORMAL

## 2021-05-19 PROCEDURE — 93306 TTE W/DOPPLER COMPLETE: CPT

## 2021-05-27 RX ORDER — APIXABAN 5 MG/1
TABLET, FILM COATED ORAL
Qty: 180 TABLET | Refills: 3 | Status: SHIPPED | OUTPATIENT
Start: 2021-05-27 | End: 2022-05-23

## 2021-06-01 LAB
ALBUMIN SERPL-MCNC: 3.4 G/DL
ALP BLD-CCNC: 110 U/L
ALT SERPL-CCNC: 13 U/L
ANION GAP SERPL CALCULATED.3IONS-SCNC: 6.4 MMOL/L
AST SERPL-CCNC: 21 U/L
BASOPHILS ABSOLUTE: 0.01 /ΜL
BASOPHILS RELATIVE PERCENT: 0.1 %
BILIRUB SERPL-MCNC: 0.3 MG/DL (ref 0.1–1.4)
BUN BLDV-MCNC: 21.9 MG/DL
CALCIUM SERPL-MCNC: 9 MG/DL
CHLORIDE BLD-SCNC: 108 MMOL/L
CO2: 24.6 MMOL/L
CREAT SERPL-MCNC: 0.73 MG/DL
EOSINOPHILS ABSOLUTE: 0.13 /ΜL
EOSINOPHILS RELATIVE PERCENT: 1.9 %
GFR CALCULATED: 61
GLUCOSE BLD-MCNC: 100 MG/DL
HCT VFR BLD CALC: 36.1 % (ref 36–46)
HEMOGLOBIN: 11.5 G/DL (ref 12–16)
LYMPHOCYTES ABSOLUTE: 1.67 /ΜL
LYMPHOCYTES RELATIVE PERCENT: 25 %
MCH RBC QN AUTO: 30.8 PG
MCHC RBC AUTO-ENTMCNC: 31.9 G/DL
MCV RBC AUTO: 96.8 FL
MONOCYTES ABSOLUTE: 0.25 /ΜL
MONOCYTES RELATIVE PERCENT: 3.7 %
NEUTROPHILS ABSOLUTE: 4.61 /ΜL
NEUTROPHILS RELATIVE PERCENT: 69.3 %
PDW BLD-RTO: 13.9 %
PLATELET # BLD: 175 K/ΜL
PMV BLD AUTO: ABNORMAL FL
POTASSIUM SERPL-SCNC: 4.4 MMOL/L
RBC # BLD: 3.73 10^6/ΜL
SODIUM BLD-SCNC: 139 MMOL/L
TOTAL PROTEIN: 6.3
WBC # BLD: 6.7 10^3/ML

## 2021-06-07 ENCOUNTER — HOSPITAL ENCOUNTER (OUTPATIENT)
Dept: MAMMOGRAPHY | Age: 61
Discharge: HOME | End: 2021-06-07
Payer: COMMERCIAL

## 2021-06-07 DIAGNOSIS — Z12.31 BREAST CANCER SCREENING BY MAMMOGRAM: ICD-10-CM

## 2021-06-07 PROCEDURE — 77063 BREAST TOMOSYNTHESIS BI: CPT

## 2021-06-15 NOTE — PROGRESS NOTES
[facility], operated by [facility]   Electrophysiology  Office Visit  Date: 6/23/2021    Chief Complaint   Patient presents with    Atrial Fibrillation    Hypertension    Coronary Artery Disease       Cardiac HX: Kayli Cohen is a 61 y.o. woman with a h/o HTN, HLD, TA, ETOH abuse, ovarian CA, CAD, s/p PCI (1/2008, 8/2008) who p/w a malignant neoplasm of L/R ovary, s/p resection of pelvic mass, SHARDA with BSO, noted to have AF/RVR on postop day #4, returned to NSR with 5 mg IV metoprolol and IV dig, Recurrent AF/RVR in hospital (4/2020), s/p RFA for AFL, SVT (4/21/2020, Dr. Annemarie Bumpers), S/p RFA PVI for AF (6/5/2020, Dr. Annemarie Bumpers)    Interval History/HPI: Patient is here for a f/u for AF, AFL, SVT. She was originally diagnosed with AF in April 2020 while she was hospitalized s/p resection of pelvic mass, complete hysterectomy, BL salpingo-oophorectomy. She initially converted to NSR after IV BB, however the AF/AFL returned while in the ARU. Patient underwent RFA for AFL and SVT (4/2020, Dr. Annemarie Bumpers) and RFA PVI for AF (6/2020, Dr. Annemarie Bumpers). She is on Eliquis, has not missed any doses, no s/sx bleeding. Today she presents in NSR, no breakthrough noted per pt. Denies palpitations, heart racing, dizziness lightheadedness. BP well controlled in the office today, 118/70. No c/o CP, PND, orthopnea, BLE edema, occasional TAPIA, but it has improved over the past year as pt has cut down her smoking to 5 cigarettes/ wk. Has lost 30# since last year, currently undergoing chemo for her ovarian CA and has abd pain with eating. Home medications:   Current Outpatient Medications on File Prior to Visit   Medication Sig Dispense Refill    ELIQUIS 5 MG TABS tablet TAKE ONE TABLET BY MOUTH TWICE A  tablet 3    metoprolol succinate (TOPROL XL) 25 MG extended release tablet       nitroGLYCERIN (NITROSTAT) 0.4 MG SL tablet up to max of 3 total doses.  If no relief after 1 dose, call 911. 25 tablet 3    gabapentin (NEURONTIN) 100 MG capsule Take 1 capsule by mouth 3 times daily for 90 days. 90 capsule 2    atorvastatin (LIPITOR) 40 MG tablet Take 1 tablet by mouth daily 30 tablet 3    bisacodyl (DULCOLAX) 5 MG EC tablet Take 1 tablet by mouth daily 60 tablet 1    melatonin 1 MG tablet Take 3 tablets by mouth nightly as needed for Sleep 30 tablet 1    methocarbamol (ROBAXIN) 750 MG tablet Take 750 mg by mouth 4 times daily as needed (Patient not taking: Reported on 6/23/2021)      sertraline (ZOLOFT) 50 MG tablet Take 50 mg by mouth daily       No current facility-administered medications on file prior to visit. Past Medical History:   Diagnosis Date    Cancer [facility])     ovary    Coronary artery disease 2008    2 stents,     Heart disease     Hyperlipemia     Hypertension     MI, old 2008    2 stents, requiered another stent in 2009     Ovarian cancer [facility])         Past Surgical History:   Procedure Laterality Date   [address]  2008, 2009    stents X3    HYSTERECTOMY Bilateral 4/8/2020    DIAGNOSTIC LAPAROSCOPY,; LAPAROTOMY RESECTION OF A PELVIC MASS, COMPLETE HYSTERECTOMY, BILATERAL SALPINGO-OOPHORECTOMY, PELVIC LYMPHADENECTOMY,  OMENTECTOMY, APPENDECTOMY; RADICAL PERIMETRECTOMY; TUMOR DEBULKING performed by Jyoti Kothari DO at [address] [place] [address] N/A 5/11/2020    INFUSAPORT PLACEMENT UNDER FLUOROSCOPIC GUIDANCE  CPT CODE - 92647-81 performed by Jyoti Kothari DO at [address] [place] [address]  2008    TUBAL LIGATION         No Known Allergies    Social History:  Reviewed. reports that she has been smoking cigarettes. She has been smoking about 0.50 packs per day. She has never used smokeless tobacco. She reports that she does not drink alcohol and does not use drugs. Family History:  Reviewed. family history includes Heart Disease in her father and mother; High Blood Pressure in her father; Stroke in her paternal grandmother. Review of System:    · Constitutional: No fevers, chills.    · Eyes: No visual changes or diplopia. No scleral icterus. · ENT: No Headaches. No mouth sores or sore throat. · Cardiovascular: No for chest pain, No for dyspnea on exertion, No for palpitations or No for loss of consciousness. No cough, hemoptysis, No for pleuritic pain, or phlebitis. · Respiratory: No for cough or wheezing. No hematemesis. · Gastrointestinal: No abdominal pain, blood in stools. · Genitourinary: No dysuria, or hematuria. · Musculoskeletal: No gait disturbance,    · Integumentary: No rash or pruritis. · Neurological: No headache, change in muscle strength, numbness or tingling. · Psychiatric: No anxiety, or depression. · Endocrine: No temperature intolerance. No excessive thirst, fluid intake, or urination. · Hem/Lymph: No abnormal bruising or bleeding, blood clots or swollen lymph nodes. · Allergic/Immunologic: No nasal congestion or hives. Physical Examination:  Vitals:    06/23/21 1024   BP: 118/70   Pulse: 63         Wt Readings from Last 3 Encounters:   06/23/21 144 lb 9.6 oz (65.6 kg)   01/21/21 162 lb 3.2 oz (73.6 kg)   11/24/20 164 lb 9.6 oz (74.7 kg)       · Constitutional: Oriented. No distress. · Head: Normocephalic and atraumatic. · Mouth/Throat: Oropharynx is clear and moist.   · Eyes: Conjunctivae clear without jaunduice. PERRL. · Neck: Neck supple. No rigidity. No JVD present. · Cardiovascular: Normal rate, regular rhythm, S1&S2. · Pulmonary/Chest: Bilateral respiratory sounds. No wheezes, No rhonchi. · Abdominal: Soft. Bowel sounds present. No distension, No tenderness. · Musculoskeletal: No tenderness. No edema    · Lymphadenopathy: Has no cervical adenopathy. · Neurological: Alert and oriented. Cranial nerve appears intact, No Gross deficit   · Skin: Skin is warm and dry. No rash noted. · Psychiatric: Has a normal mood, affect and behavior     Labs:  Reviewed. No results for input(s): NA, K, CL, CO2, PHOS, BUN, CREATININE in the last 72 hours.     Invalid input(s): CA,  TSH  No results for input(s): WBC, HGB, HCT, MCV, PLT in the last 72 hours. Lab Results   Component Value Date    TROPONINI <0.01 04/16/2020     No results found for: BNP  Lab Results   Component Value Date    PROTIME 18.0 06/02/2020    PROTIME 15.7 04/20/2020    PROTIME 18.1 04/17/2020    INR 1.54 06/02/2020    INR 1.35 04/20/2020    INR 1.55 04/17/2020     Lab Results   Component Value Date    CHOL 175 05/22/2014    HDL 46 05/22/2014    HDL 54 03/30/2011    TRIG 174 05/22/2014       ECG: Personally reviewed: NSR, HR 63, , QRS 98, QTc 419    ECHO:  5.19.2021  Summary   Normal left ventricular systolic function with an estimated ejection   fraction of 60-65%. No regional wall motion abnormalities are seen. Mild basal septal hypertrophy is present with normal thickness of remaining   left ventricular wall segments. Grade I diastolic dysfunction with normal filling pressure. Mild posterior mitral annular calcification is present. Mild mitral regurgitation. Mild tricuspid regurgitation. Normal systolic pulmonary artery pressure (SPAP) estimated at 24 mmHg (RA   pressure 3 mmHg). 4/13/2020 55-60% EF , mild MAC w mild MR    Stress Test: 4.17.2020  Summary   Jing Baptiste is normal isotope uptake at stress and rest. There is no evidence of    myocardial ischemia or scar.    The LVEF is normal at greater than 70% with normal LV wall motion.        This is a low risk cardiac scan.         Cardiac Angiography: 8.21.2008 ([facility])  This result has an attachment that is not available.      Problem List:   Patient Active Problem List    Diagnosis Date Noted    [facility]     Atrial fibrillation with RVR ([facility] 04/16/2020    Debility 04/15/2020    Ovarian cancer on left ([address] 04/08/2020    Coronary artery disease 10/09/2012    Hyperlipidemia 10/09/2012    HTN (hypertension) 10/09/2012    Alcohol abuse 10/09/2012    Tobacco abuse 10/09/2012        Assessment:   1. Paroxysmal atrial fibrillation (HCC)    2. Typical atrial flutter ([address]    3. SVT (supraventricular tachycardia) ([address]    4. Coronary artery disease involving native coronary artery of native heart without angina pectoris    5. Essential hypertension        Cardiac HX: Gonzalo Ingram is a 61 y.o. woman with a h/o HTN, HLD, TA, ETOH abuse, ovarian CA, CAD, s/p PCI (1/2008, 8/2008) who p/w a malignant neoplasm of L/R ovary, s/p resection of pelvic mass, SHARDA with BSO, noted to have AF/RVR on postop day #4, returned to NSR with 5 mg IV metoprolol and IV dig, Recurrent AF/RVR in hospital (4/2020), s/p RFA for AFL, SVT (4/21/2020, Dr. Zelda Bello), S/p RFA PVI for AF (6/5/2020, Dr. Zelda Bello)  [id number] 3.  TSH 1.20 (11.4.2020)     PAF/AFL/SVT  - In NSR, no breakthrough per pt  - On apixaban 5 mg - no s/sx bleeding, continue  - On Toprol XL 25 mg QD  - S/p RFA for AFL and SVT (4/21/20)  - S/p RFA PVI for AF (6/5/2020)  - ECG completed and personally reviewed      HTN  - BP well controlled in office today  - On Toprol XL 25 mg QD  - Lifestyle modifications - smoking cessation, handout given    CAD  - No s/sx  - S/p PCI (1/2008, 8/2008)  - On statin, Eliquis  - Follows w/ Acey Cos, NP    - Will request labs from [facility]    EF of 60-65%  No known HF  Statin for CAD  Anticoagulation for AF       Ruth Ann Liz [address]

## 2021-06-22 LAB
ALBUMIN SERPL-MCNC: 3.4 G/DL
ALP BLD-CCNC: 94 U/L
ALT SERPL-CCNC: 23 U/L
ANION GAP SERPL CALCULATED.3IONS-SCNC: NORMAL MMOL/L
AST SERPL-CCNC: 20 U/L
BASOPHILS ABSOLUTE: 0.01 /ΜL
BASOPHILS RELATIVE PERCENT: 0.2 %
BILIRUB SERPL-MCNC: 0.4 MG/DL (ref 0.1–1.4)
BUN BLDV-MCNC: 13 MG/DL
CALCIUM SERPL-MCNC: 9.7 MG/DL
CHLORIDE BLD-SCNC: 109 MMOL/L
CO2: 27 MMOL/L
CREAT SERPL-MCNC: 0.7 MG/DL
EOSINOPHILS ABSOLUTE: 0.01 /ΜL
EOSINOPHILS RELATIVE PERCENT: 0.2 %
GFR CALCULATED: 61
GLUCOSE BLD-MCNC: 82 MG/DL
HCT VFR BLD CALC: 37 % (ref 36–46)
HEMOGLOBIN: 11.9 G/DL (ref 12–16)
LYMPHOCYTES ABSOLUTE: 1.59 /ΜL
LYMPHOCYTES RELATIVE PERCENT: 36.3 %
MCH RBC QN AUTO: 31.6 PG
MCHC RBC AUTO-ENTMCNC: 32.2 G/DL
MCV RBC AUTO: 98.4 FL
MONOCYTES ABSOLUTE: 0.76 /ΜL
MONOCYTES RELATIVE PERCENT: 17.4 %
NEUTROPHILS ABSOLUTE: 2.01 /ΜL
NEUTROPHILS RELATIVE PERCENT: 45.9 %
PDW BLD-RTO: 16.2 %
PLATELET # BLD: 130 K/ΜL
PMV BLD AUTO: ABNORMAL FL
POTASSIUM SERPL-SCNC: 4.2 MMOL/L
RBC # BLD: 3.76 10^6/ΜL
SODIUM BLD-SCNC: 141 MMOL/L
TOTAL PROTEIN: 7.2
WBC # BLD: 4.4 10^3/ML

## 2021-06-23 ENCOUNTER — OFFICE VISIT (OUTPATIENT)
Dept: CARDIOLOGY CLINIC | Age: 61
End: 2021-06-23
Payer: COMMERCIAL

## 2021-06-23 VITALS
WEIGHT: 144.6 LBS | BODY MASS INDEX: 21.42 KG/M2 | SYSTOLIC BLOOD PRESSURE: 118 MMHG | HEART RATE: 63 BPM | DIASTOLIC BLOOD PRESSURE: 70 MMHG | HEIGHT: 69 IN

## 2021-06-23 DIAGNOSIS — I48.0 PAROXYSMAL ATRIAL FIBRILLATION (HCC): Primary | ICD-10-CM

## 2021-06-23 DIAGNOSIS — I10 ESSENTIAL HYPERTENSION: ICD-10-CM

## 2021-06-23 DIAGNOSIS — I48.3 TYPICAL ATRIAL FLUTTER (HCC): ICD-10-CM

## 2021-06-23 DIAGNOSIS — I47.1 SVT (SUPRAVENTRICULAR TACHYCARDIA) (HCC): ICD-10-CM

## 2021-06-23 DIAGNOSIS — I25.10 CORONARY ARTERY DISEASE INVOLVING NATIVE CORONARY ARTERY OF NATIVE HEART WITHOUT ANGINA PECTORIS: ICD-10-CM

## 2021-06-23 PROCEDURE — G8420 CALC BMI NORM PARAMETERS: HCPCS | Performed by: NURSE PRACTITIONER

## 2021-06-23 PROCEDURE — G8427 DOCREV CUR MEDS BY ELIG CLIN: HCPCS | Performed by: NURSE PRACTITIONER

## 2021-06-23 PROCEDURE — 93000 ELECTROCARDIOGRAM COMPLETE: CPT | Performed by: NURSE PRACTITIONER

## 2021-06-23 PROCEDURE — 3017F COLORECTAL CA SCREEN DOC REV: CPT | Performed by: NURSE PRACTITIONER

## 2021-06-23 PROCEDURE — 99214 OFFICE O/P EST MOD 30 MIN: CPT | Performed by: NURSE PRACTITIONER

## 2021-06-23 PROCEDURE — 4004F PT TOBACCO SCREEN RCVD TLK: CPT | Performed by: NURSE PRACTITIONER

## 2021-06-25 RX ORDER — ALPRAZOLAM 0.25 MG/1
0.25 TABLET ORAL NIGHTLY PRN
COMMUNITY
End: 2021-11-03

## 2021-06-25 RX ORDER — PROMETHAZINE HYDROCHLORIDE 25 MG/1
25 SUPPOSITORY RECTAL EVERY 6 HOURS PRN
COMMUNITY
End: 2022-06-17

## 2021-06-25 RX ORDER — IBUPROFEN 200 MG
200 TABLET ORAL EVERY 6 HOURS PRN
COMMUNITY
End: 2022-06-17

## 2021-06-25 RX ORDER — PROCHLORPERAZINE MALEATE 10 MG
10 TABLET ORAL EVERY 6 HOURS PRN
COMMUNITY
End: 2022-06-17

## 2021-06-25 RX ORDER — ONDANSETRON HYDROCHLORIDE 8 MG/1
8 TABLET, FILM COATED ORAL EVERY 8 HOURS PRN
COMMUNITY
End: 2022-08-08

## 2021-06-25 RX ORDER — TRAMADOL HYDROCHLORIDE 50 MG/1
50 TABLET ORAL EVERY 6 HOURS PRN
COMMUNITY
End: 2022-06-17

## 2021-06-25 RX ORDER — TOPIRAMATE 25 MG/1
5 TABLET ORAL DAILY
COMMUNITY

## 2021-06-25 RX ORDER — ACETAMINOPHEN 500 MG
500 TABLET ORAL EVERY 6 HOURS PRN
COMMUNITY

## 2021-06-25 RX ORDER — SOTALOL HYDROCHLORIDE 120 MG/1
120 TABLET ORAL 2 TIMES DAILY
COMMUNITY
End: 2021-11-03

## 2021-06-25 RX ORDER — PANTOPRAZOLE SODIUM 40 MG/1
40 TABLET, DELAYED RELEASE ORAL DAILY
COMMUNITY
End: 2022-06-17

## 2021-06-25 RX ORDER — LIDOCAINE AND PRILOCAINE 25; 25 MG/G; MG/G
CREAM TOPICAL PRN
COMMUNITY

## 2021-06-29 ENCOUNTER — HOSPITAL ENCOUNTER (OUTPATIENT)
Dept: CT IMAGING | Age: 61
Discharge: HOME | End: 2021-06-29
Payer: COMMERCIAL

## 2021-06-29 DIAGNOSIS — R10.0 ACUTE ABDOMINAL COMPLAINT: ICD-10-CM

## 2021-06-29 PROCEDURE — 74177 CT ABD & PELVIS W/CONTRAST: CPT

## 2021-06-29 PROCEDURE — 6360000004 HC RX CONTRAST MEDICATION: Performed by: OBSTETRICS & GYNECOLOGY

## 2021-06-29 RX ADMIN — IOHEXOL 50 ML: 240 INJECTION, SOLUTION INTRATHECAL; INTRAVASCULAR; INTRAVENOUS; ORAL at 12:10

## 2021-06-29 RX ADMIN — IOPAMIDOL 75 ML: 755 INJECTION, SOLUTION INTRAVENOUS at 12:12

## 2021-11-02 ENCOUNTER — HOSPITAL ENCOUNTER (OUTPATIENT)
Age: 61
Discharge: HOME | End: 2021-11-02
Payer: COMMERCIAL

## 2021-11-02 LAB
ABO/RH: NORMAL
ANTIBODY SCREEN: NORMAL

## 2021-11-02 PROCEDURE — 86850 RBC ANTIBODY SCREEN: CPT

## 2021-11-02 PROCEDURE — 86923 COMPATIBILITY TEST ELECTRIC: CPT

## 2021-11-02 PROCEDURE — P9016 RBC LEUKOCYTES REDUCED: HCPCS

## 2021-11-02 PROCEDURE — 36415 COLL VENOUS BLD VENIPUNCTURE: CPT

## 2021-11-02 PROCEDURE — 86901 BLOOD TYPING SEROLOGIC RH(D): CPT

## 2021-11-02 PROCEDURE — 86900 BLOOD TYPING SEROLOGIC ABO: CPT

## 2021-11-03 ENCOUNTER — HOSPITAL ENCOUNTER (OUTPATIENT)
Dept: NURSING | Age: 61
Setting detail: INFUSION SERIES
Discharge: HOME | End: 2021-11-03
Payer: COMMERCIAL

## 2021-11-03 VITALS
RESPIRATION RATE: 16 BRPM | BODY MASS INDEX: 22.22 KG/M2 | OXYGEN SATURATION: 97 % | HEART RATE: 63 BPM | TEMPERATURE: 97.2 F | WEIGHT: 150 LBS | HEIGHT: 69 IN | DIASTOLIC BLOOD PRESSURE: 56 MMHG | SYSTOLIC BLOOD PRESSURE: 103 MMHG

## 2021-11-03 LAB
BLOOD BANK DISPENSE STATUS: NORMAL
BLOOD BANK DISPENSE STATUS: NORMAL
BLOOD BANK PRODUCT CODE: NORMAL
BLOOD BANK PRODUCT CODE: NORMAL
BPU ID: NORMAL
BPU ID: NORMAL
DESCRIPTION BLOOD BANK: NORMAL
DESCRIPTION BLOOD BANK: NORMAL

## 2021-11-03 PROCEDURE — 36430 TRANSFUSION BLD/BLD COMPNT: CPT

## 2021-11-03 PROCEDURE — P9016 RBC LEUKOCYTES REDUCED: HCPCS

## 2021-11-03 PROCEDURE — 99211 OFF/OP EST MAY X REQ PHY/QHP: CPT

## 2021-11-03 RX ORDER — ESCITALOPRAM OXALATE 10 MG/1
10 TABLET ORAL DAILY
COMMUNITY

## 2021-11-03 RX ORDER — MORPHINE SULFATE 15 MG/1
30 TABLET ORAL 2 TIMES DAILY
COMMUNITY

## 2021-11-03 ASSESSMENT — PAIN DESCRIPTION - DESCRIPTORS: DESCRIPTORS: SHARP

## 2021-11-03 ASSESSMENT — PAIN - FUNCTIONAL ASSESSMENT: PAIN_FUNCTIONAL_ASSESSMENT: 0-10

## 2021-11-03 NOTE — PROGRESS NOTES
Pt tolerates transfusion well discharge instructions given and verbalizes understanding discharged to  Home in stable condition

## 2021-11-05 ENCOUNTER — HOSPITAL ENCOUNTER (OUTPATIENT)
Dept: MRI IMAGING | Age: 61
Discharge: HOME | End: 2021-11-05
Payer: COMMERCIAL

## 2021-11-05 DIAGNOSIS — C56.2 OVARIAN CANCER ON LEFT (HCC): ICD-10-CM

## 2021-11-05 PROCEDURE — 72158 MRI LUMBAR SPINE W/O & W/DYE: CPT

## 2021-11-05 PROCEDURE — 72157 MRI CHEST SPINE W/O & W/DYE: CPT

## 2021-11-05 PROCEDURE — 6360000004 HC RX CONTRAST MEDICATION: Performed by: OBSTETRICS & GYNECOLOGY

## 2021-11-05 PROCEDURE — A9579 GAD-BASE MR CONTRAST NOS,1ML: HCPCS | Performed by: OBSTETRICS & GYNECOLOGY

## 2021-12-09 NOTE — PROGRESS NOTES
[facility]   Electrophysiology  Office Visit  Date: 12/20/2021    Chief Complaint   Patient presents with    Atrial Fibrillation    Atrial Flutter    Hypertension       Cardiac HX: Lexi Crabtree is a 64 y.o. woman with a h/o HTN, HLD, TA, ETOH abuse, ovarian CA, CAD, s/p PCI (1/2008, 8/2008) who p/w a malignant neoplasm of L/R ovary, s/p resection of pelvic mass, SHARDA with BSO, noted to have AF/RVR on postop day #4, returned to NSR with 5 mg IV metoprolol and IV dig, recurrent AF/RVR in hospital (4/2020), s/p RFA for AFL, SVT (4/21/2020, Dr. Raisa Pham), s/p RFA/PVI for AF (6/5/2020, Dr. Raisa Pham). Interval History/HPI: Patient is here for f/u for AF/AFL, and SVT. Patient was originally diagnosed with paroxysmal AF in April 2020 when she was hospitalized for a resection of the pelvic mass, complete SHARDA, bilateral salpingo-oophorectomy. She was initially given IV beta-blocker and converted to NSR however had recurrent AF while in the ARU. She had a catheter ablation for AFL/SVT in April 2020 and a catheter ablation for atrial fibrillation in June 2020. She remains on Eliquis and has not missed any doses. She denies any bleeding or dark tarry stools. She states that her last platelets were in the 46s. Oncology has not taken her off of her [address] She presents in NSR today. She denies any heart racing, palpitations or irregular heartbeats. She denies any breakthrough of her A. fib. She denies any chest pain, shortness of breath, PND, orthopnea or lower extremity edema. 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 She is not currently having any back pain. Home medications:   Current Outpatient Medications on File Prior to Visit   Medication Sig Dispense Refill    HYDROcodone-acetaminophen  MG TABS Take by mouth.  Every 6 hrs- prn      escitalopram (LEXAPRO) 10 MG tablet Take 10 mg by mouth daily      morphine (MSIR) 15 MG tablet Take 30 mg by mouth 2 times daily.  prochlorperazine (COMPAZINE) 10 MG tablet Take 10 mg by mouth every 6 hours as needed      lidocaine-prilocaine (EMLA) 2.5-2.5 % cream Apply topically as needed for Pain Apply topically as needed.  ondansetron (ZOFRAN) 8 MG tablet Take 8 mg by mouth every 8 hours as needed for Nausea or Vomiting      promethazine (PHENERGAN) 25 MG suppository Place 25 mg rectally every 6 hours as needed for Nausea      pantoprazole (PROTONIX) 40 MG tablet Take 40 mg by mouth daily      topiramate (TOPAMAX) 25 MG tablet Take 5 mg by mouth 2 times daily      acetaminophen (TYLENOL) 500 MG tablet Take 500 mg by mouth every 6 hours as needed for Pain      ELIQUIS 5 MG TABS tablet TAKE ONE TABLET BY MOUTH TWICE A  tablet 3    metoprolol succinate (TOPROL XL) 25 MG extended release tablet       nitroGLYCERIN (NITROSTAT) 0.4 MG SL tablet up to max of 3 total doses. If no relief after 1 dose, call 911. 25 tablet 3    gabapentin (NEURONTIN) 100 MG capsule Take 1 capsule by mouth 3 times daily for 90 days. 90 capsule 2    atorvastatin (LIPITOR) 40 MG tablet Take 1 tablet by mouth daily 30 tablet 3    bisacodyl (DULCOLAX) 5 MG EC tablet Take 1 tablet by mouth daily 60 tablet 1    melatonin 1 MG tablet Take 3 tablets by mouth nightly as needed for Sleep 30 tablet 1    traMADol (ULTRAM) 50 MG tablet Take 50 mg by mouth every 6 hours as needed for Pain. (Patient not taking: Reported on 12/20/2021)      ibuprofen (ADVIL;MOTRIN) 200 MG tablet Take 200 mg by mouth every 6 hours as needed for Pain (Patient not taking: Reported on 12/20/2021)       No current facility-administered medications on file prior to visit.        Past Medical History:   Diagnosis Date    Cancer [facility])     ovary    Coronary artery disease 2008    2 stents,     Heart disease     Hyperlipemia     Hypertension     MI, old 2008    2 stents, requiered another stent in 2009     Ovarian cancer [facility])         Past Surgical History:   Procedure Laterality Date    CARDIAC SURGERY  2008, 2009    stents X3    HYSTERECTOMY Bilateral 4/8/2020    DIAGNOSTIC LAPAROSCOPY,; LAPAROTOMY RESECTION OF A PELVIC MASS, COMPLETE HYSTERECTOMY, BILATERAL SALPINGO-OOPHORECTOMY, PELVIC LYMPHADENECTOMY,  OMENTECTOMY, APPENDECTOMY; RADICAL PERIMETRECTOMY; TUMOR DEBULKING performed by Beeln Delgado DO at [facility] N/A 5/11/2020    INFUSAPORT PLACEMENT UNDER FLUOROSCOPIC GUIDANCE  CPT CODE - 30916-45 performed by Belen Delgado DO at [facility]  2008    TUBAL LIGATION         No Known Allergies    Social History:  Reviewed. reports that she has been smoking cigarettes. She has been smoking about 0.50 packs per day. She has never used smokeless tobacco. She reports that she does not drink alcohol and does not use drugs. Family History:  Reviewed. family history includes Heart Disease in her father and mother; High Blood Pressure in her father; Stroke in her paternal grandmother. Review of System:    · Constitutional: No fevers, chills. · Eyes: No visual changes or diplopia. No scleral icterus. · ENT: No Headaches. No mouth sores or sore throat. · Cardiovascular: No for chest pain, No for dyspnea on exertion, No for palpitations or No for loss of consciousness. No cough, hemoptysis, No for pleuritic pain, or phlebitis. · Respiratory: No for cough or wheezing. No hematemesis. · Gastrointestinal: No abdominal pain, blood in stools. · Genitourinary: No dysuria, or hematuria. · Musculoskeletal: No gait disturbance,    · Integumentary: No rash or pruritis. · Neurological: No headache, change in muscle strength, numbness or tingling. · Psychiatric: No anxiety, or depression. · Endocrine: No temperature intolerance. No excessive thirst, fluid intake, or urination.    · Hem/Lymph: No abnormal bruising or bleeding, blood clots or swollen lymph nodes.  · Allergic/Immunologic: No nasal congestion or hives. Physical Examination:  Vitals:    12/20/21 1254   BP: 120/68   Pulse: 64         Wt Readings from Last 3 Encounters:   12/20/21 150 lb 6.4 oz (68.2 kg)   11/03/21 150 lb (68 kg)   06/23/21 144 lb 9.6 oz (65.6 kg)       · Constitutional: Oriented. No distress. · Head: Normocephalic and atraumatic. · Mouth/Throat: Oropharynx is clear and moist.   · Eyes: Conjunctivae clear without jaunduice. PERRL. · Neck: Neck supple. No rigidity. No JVD present. · Cardiovascular: Normal rate, regular rhythm, S1&S2. · Pulmonary/Chest: Bilateral respiratory sounds. No wheezes, No rhonchi. · Abdominal: Soft. Bowel sounds present. No distension, No tenderness. · Musculoskeletal: No tenderness. No edema    · Lymphadenopathy: Has no cervical adenopathy. · Neurological: Alert and oriented. Cranial nerve appears intact, No Gross deficit   · Skin: Skin is warm and dry. No rash noted. · Psychiatric: Has a normal mood, affect and behavior     Labs:  Reviewed. No results for input(s): NA, K, CL, CO2, PHOS, BUN, CREATININE in the last 72 hours. Invalid input(s): CA,  TSH  No results for input(s): WBC, HGB, HCT, MCV, PLT in the last 72 hours. Lab Results   Component Value Date    TROPONINI <0.01 04/16/2020     No results found for: BNP  Lab Results   Component Value Date    PROTIME 18.0 06/02/2020    PROTIME 15.7 04/20/2020    PROTIME 18.1 04/17/2020    INR 1.54 06/02/2020    INR 1.35 04/20/2020    INR 1.55 04/17/2020     Lab Results   Component Value Date    CHOL 175 05/22/2014    HDL 46 05/22/2014    HDL 54 03/30/2011    TRIG 174 05/22/2014       ECG: Personally reviewed: NSR, HR 64, , QRS 96, QTc 437    ECHO:  5.19.2021  Summary   Normal left ventricular systolic function with an estimated ejection   fraction of 60-65%. No regional wall motion abnormalities are seen.    Mild basal septal hypertrophy is present with normal thickness of remaining left ventricular wall segments. Grade I diastolic dysfunction with normal filling pressure. Mild posterior mitral annular calcification is present. Mild mitral regurgitation. Mild tricuspid regurgitation. Normal systolic pulmonary artery pressure (SPAP) estimated at 24 mmHg (RA pressure 3 mmHg). 4/13/2020 55-60% EF , mild MAC w mild MR    Stress Test: 4.17.2020  Summary   Junior Brown is normal isotope uptake at stress and rest. There is no evidence of    myocardial ischemia or scar.    The LVEF is normal at greater than 70% with normal LV wall motion.        This is a low risk cardiac scan.         Cardiac Angiography: 8.21.2008 ([facility])  This result has an attachment that is not available.      Problem List:   Patient Active Problem List    Diagnosis Date Noted    Paroxysmal atrial fibrillation ([address]     Atrial fibrillation with RVR ([address] 04/16/2020    Debility 04/15/2020    Ovarian cancer on left ([address] 04/08/2020    Coronary artery disease 10/09/2012    Hyperlipidemia 10/09/2012    HTN (hypertension) 10/09/2012    Alcohol abuse 10/09/2012    Tobacco abuse 10/09/2012        Assessment:   1. Paroxysmal atrial fibrillation (HCC)    2. Paroxysmal atrial flutter ([address]    3. SVT (supraventricular tachycardia) ([address]    4. Essential hypertension    5. On continuous oral anticoagulation    6. Coronary artery disease involving native coronary artery of native heart without angina pectoris        Cardiac HX: Felipe Herndon is a 64 y.o. woman with a h/o HTN, HLD, TA, ETOH abuse, ovarian CA, CAD, s/p PCI (1/2008, 8/2008) who p/w a malignant neoplasm of L/R ovary, s/p resection of pelvic mass, SHARDA with BSO, noted to have AF/RVR on postop day #4, returned to NSR with 5 mg IV metoprolol and IV dig, Recurrent AF/RVR in hospital (4/2020), s/p RFA for AFL, SVT (4/21/2020, Dr. Jeffrey Harris), S/p RFA PVI for AF (6/5/2020, Dr. Jeffrey Harris)  [id number] 3.  TSH 1.20 (11.4.2020)     PAF/AFL/SVT  - In NSR, no breakthrough per pt  - On apixaban 5 mg - no s/s bleeding, continue  - On Toprol XL 25 mg QD  - S/p RFA for AFL and SVT (4/21/20)  - S/p RFA PVI for AF (6/5/2020)  - ECG completed and personally reviewed      HTN  - BP well controlled in office today  - On Toprol XL 25 mg QD  - Lifestyle modifications - smoking cessation, handout given    CAD  - No s/s  - S/p PCI (1/2008, 8/2008)  - On statin, Eliquis  - Follows w/ Ted Bae NP  - Will request labs from [facility]    F/u in 6 months.      EF of 60-65%  No known HF  Statin for CAD  Anticoagulation for AF       Edson Santillan [address]

## 2021-12-20 ENCOUNTER — OFFICE VISIT (OUTPATIENT)
Dept: CARDIOLOGY CLINIC | Age: 61
End: 2021-12-20
Payer: COMMERCIAL

## 2021-12-20 VITALS
DIASTOLIC BLOOD PRESSURE: 68 MMHG | HEIGHT: 69 IN | WEIGHT: 150.4 LBS | BODY MASS INDEX: 22.28 KG/M2 | SYSTOLIC BLOOD PRESSURE: 120 MMHG | HEART RATE: 64 BPM

## 2021-12-20 DIAGNOSIS — I47.1 SVT (SUPRAVENTRICULAR TACHYCARDIA) (HCC): ICD-10-CM

## 2021-12-20 DIAGNOSIS — I10 ESSENTIAL HYPERTENSION: ICD-10-CM

## 2021-12-20 DIAGNOSIS — I25.10 CORONARY ARTERY DISEASE INVOLVING NATIVE CORONARY ARTERY OF NATIVE HEART WITHOUT ANGINA PECTORIS: ICD-10-CM

## 2021-12-20 DIAGNOSIS — Z79.01 ON CONTINUOUS ORAL ANTICOAGULATION: ICD-10-CM

## 2021-12-20 DIAGNOSIS — I48.0 PAROXYSMAL ATRIAL FIBRILLATION (HCC): Primary | ICD-10-CM

## 2021-12-20 DIAGNOSIS — I48.92 PAROXYSMAL ATRIAL FLUTTER (HCC): ICD-10-CM

## 2021-12-20 PROCEDURE — G8427 DOCREV CUR MEDS BY ELIG CLIN: HCPCS | Performed by: NURSE PRACTITIONER

## 2021-12-20 PROCEDURE — G8420 CALC BMI NORM PARAMETERS: HCPCS | Performed by: NURSE PRACTITIONER

## 2021-12-20 PROCEDURE — 99214 OFFICE O/P EST MOD 30 MIN: CPT | Performed by: NURSE PRACTITIONER

## 2021-12-20 PROCEDURE — 4004F PT TOBACCO SCREEN RCVD TLK: CPT | Performed by: NURSE PRACTITIONER

## 2021-12-20 PROCEDURE — G8484 FLU IMMUNIZE NO ADMIN: HCPCS | Performed by: NURSE PRACTITIONER

## 2021-12-20 PROCEDURE — 3017F COLORECTAL CA SCREEN DOC REV: CPT | Performed by: NURSE PRACTITIONER

## 2021-12-20 PROCEDURE — 93000 ELECTROCARDIOGRAM COMPLETE: CPT | Performed by: NURSE PRACTITIONER

## 2021-12-20 RX ORDER — HYDROCODONE BITARTRATE AND ACETAMINOPHEN 10; 300 MG/1; MG/1
TABLET ORAL
COMMUNITY

## 2021-12-23 ENCOUNTER — HOSPITAL ENCOUNTER (OUTPATIENT)
Dept: CT IMAGING | Age: 61
Discharge: HOME | End: 2021-12-23
Payer: COMMERCIAL

## 2021-12-23 ENCOUNTER — HOSPITAL ENCOUNTER (OUTPATIENT)
Age: 61
Discharge: HOME | End: 2021-12-23
Payer: COMMERCIAL

## 2021-12-23 ENCOUNTER — HOSPITAL ENCOUNTER (OUTPATIENT)
Dept: NON INVASIVE DIAGNOSTICS | Age: 61
Discharge: HOME | End: 2021-12-23
Payer: COMMERCIAL

## 2021-12-23 DIAGNOSIS — Z00.6 EXAMINATION OF PARTICIPANT IN CLINICAL TRIAL: ICD-10-CM

## 2021-12-23 DIAGNOSIS — C56.1 MALIGNANT NEOPLASM OF RIGHT OVARY (HCC): ICD-10-CM

## 2021-12-23 LAB
CREAT SERPL-MCNC: 0.6 MG/DL (ref 0.6–1.2)
GFR AFRICAN AMERICAN: >60
GFR NON-AFRICAN AMERICAN: >60
LV EF: 63 %
LVEF MODALITY: NORMAL

## 2021-12-23 PROCEDURE — 6360000004 HC RX CONTRAST MEDICATION: Performed by: OBSTETRICS & GYNECOLOGY

## 2021-12-23 PROCEDURE — 82565 ASSAY OF CREATININE: CPT

## 2021-12-23 PROCEDURE — 93306 TTE W/DOPPLER COMPLETE: CPT

## 2021-12-23 PROCEDURE — 71260 CT THORAX DX C+: CPT

## 2021-12-23 RX ADMIN — IOPAMIDOL 75 ML: 755 INJECTION, SOLUTION INTRAVENOUS at 15:27

## 2021-12-23 RX ADMIN — IOHEXOL 50 ML: 240 INJECTION, SOLUTION INTRATHECAL; INTRAVASCULAR; INTRAVENOUS; ORAL at 15:28

## 2021-12-27 ENCOUNTER — TELEPHONE (OUTPATIENT)
Dept: CARDIOLOGY CLINIC | Age: 61
End: 2021-12-27

## 2021-12-27 NOTE — TELEPHONE ENCOUNTER
Dr. Ryder Waldron office called and states that PT needs a medication change (Lipitor) Please call Lissette Felix at [phone] ext [phone]

## 2021-12-27 NOTE — TELEPHONE ENCOUNTER
Pt is participating in a clinical trial of chemotherapy and can not be on any CY class medications or BCRP class medications until trial is over. This may be up to a year treatment time.  Lipitor discontinued per Oncologist request

## 2021-12-27 NOTE — TELEPHONE ENCOUNTER
Makeda Cast and Benjamin Ortega have been following patient for CAD, will forward information to them.

## 2022-01-07 LAB
ALBUMIN SERPL-MCNC: 3.4 G/DL
ALP BLD-CCNC: 106 U/L
ALT SERPL-CCNC: 20 U/L
ANION GAP SERPL CALCULATED.3IONS-SCNC: NORMAL MMOL/L
AST SERPL-CCNC: 26 U/L
BASOPHILS ABSOLUTE: 0.01 /ΜL
BASOPHILS RELATIVE PERCENT: 0.2 %
BILIRUB SERPL-MCNC: 0.4 MG/DL (ref 0.1–1.4)
BUN BLDV-MCNC: 10 MG/DL
CALCIUM SERPL-MCNC: 10 MG/DL
CHLORIDE BLD-SCNC: 114 MMOL/L
CO2: 25 MMOL/L
CREAT SERPL-MCNC: 0.7 MG/DL
EOSINOPHILS ABSOLUTE: 0.1 /ΜL
EOSINOPHILS RELATIVE PERCENT: 1.5 %
GFR CALCULATED: 60
GLUCOSE BLD-MCNC: 95 MG/DL
HCT VFR BLD CALC: 32 % (ref 36–46)
HEMOGLOBIN: 10.1 G/DL (ref 12–16)
LYMPHOCYTES ABSOLUTE: 1.47 /ΜL
LYMPHOCYTES RELATIVE PERCENT: 22.1 %
MCH RBC QN AUTO: 33.2 PG
MCHC RBC AUTO-ENTMCNC: 31.6 G/DL
MCV RBC AUTO: 105.3 FL
MONOCYTES ABSOLUTE: 0.61 /ΜL
MONOCYTES RELATIVE PERCENT: 9.2 %
NEUTROPHILS ABSOLUTE: 4.45 /ΜL
NEUTROPHILS RELATIVE PERCENT: 67 %
PDW BLD-RTO: 17.2 %
PLATELET # BLD: 181 K/ΜL
PMV BLD AUTO: ABNORMAL FL
POTASSIUM SERPL-SCNC: 4.3 MMOL/L
RBC # BLD: 3.04 10^6/ΜL
SODIUM BLD-SCNC: 141 MMOL/L
TOTAL PROTEIN: 6.8
WBC # BLD: 6.6 10^3/ML

## 2022-01-07 RX ORDER — OXYCODONE HYDROCHLORIDE 5 MG/1
TABLET ORAL
COMMUNITY
Start: 2021-12-22 | End: 2022-06-17

## 2022-03-01 ENCOUNTER — HOSPITAL ENCOUNTER (OUTPATIENT)
Dept: CT IMAGING | Age: 62
Discharge: HOME | End: 2022-03-01
Payer: COMMERCIAL

## 2022-03-01 DIAGNOSIS — Z00.6 EXAMINATION OF PARTICIPANT IN CLINICAL TRIAL: ICD-10-CM

## 2022-03-01 DIAGNOSIS — C56.1 MALIGNANT NEOPLASM OF RIGHT OVARY (HCC): ICD-10-CM

## 2022-03-01 LAB
GFR AFRICAN AMERICAN: >60
GFR NON-AFRICAN AMERICAN: >60
PERFORMED ON: NORMAL
POC CREATININE: 0.6 MG/DL (ref 0.6–1.2)
POC SAMPLE TYPE: NORMAL

## 2022-03-01 PROCEDURE — 6360000004 HC RX CONTRAST MEDICATION: Performed by: OBSTETRICS & GYNECOLOGY

## 2022-03-01 PROCEDURE — 82565 ASSAY OF CREATININE: CPT

## 2022-03-01 PROCEDURE — 71260 CT THORAX DX C+: CPT

## 2022-03-01 RX ADMIN — IOHEXOL 50 ML: 240 INJECTION, SOLUTION INTRATHECAL; INTRAVASCULAR; INTRAVENOUS; ORAL at 08:30

## 2022-03-01 RX ADMIN — IOPAMIDOL 75 ML: 755 INJECTION, SOLUTION INTRAVENOUS at 08:31

## 2022-03-02 ENCOUNTER — HOSPITAL ENCOUNTER (OUTPATIENT)
Dept: VASCULAR LAB | Age: 62
Discharge: HOME | End: 2022-03-02
Payer: COMMERCIAL

## 2022-03-02 DIAGNOSIS — C56.1 MALIGNANT NEOPLASM OF RIGHT OVARY (HCC): ICD-10-CM

## 2022-03-02 DIAGNOSIS — R60.0 LOWER EXTREMITY EDEMA: ICD-10-CM

## 2022-03-02 PROCEDURE — 93970 EXTREMITY STUDY: CPT

## 2022-04-25 ENCOUNTER — HOSPITAL ENCOUNTER (OUTPATIENT)
Dept: CT IMAGING | Age: 62
Discharge: HOME | End: 2022-04-25
Payer: COMMERCIAL

## 2022-04-25 DIAGNOSIS — Z00.6 EXAMINATION OF PARTICIPANT IN CLINICAL TRIAL: ICD-10-CM

## 2022-04-25 DIAGNOSIS — C56.1 OVARIAN CANCER ON RIGHT (HCC): ICD-10-CM

## 2022-04-25 PROCEDURE — 71260 CT THORAX DX C+: CPT

## 2022-04-25 PROCEDURE — 6360000004 HC RX CONTRAST MEDICATION: Performed by: OBSTETRICS & GYNECOLOGY

## 2022-04-25 RX ADMIN — IOHEXOL 50 ML: 240 INJECTION, SOLUTION INTRATHECAL; INTRAVASCULAR; INTRAVENOUS; ORAL at 08:57

## 2022-04-25 RX ADMIN — IOPAMIDOL 75 ML: 755 INJECTION, SOLUTION INTRAVENOUS at 08:57

## 2022-05-23 RX ORDER — APIXABAN 5 MG/1
TABLET, FILM COATED ORAL
Qty: 180 TABLET | Refills: 3 | Status: SHIPPED | OUTPATIENT
Start: 2022-05-23

## 2022-06-09 NOTE — PROGRESS NOTES
tired.  She did have a CT of the chest as part of her study protocol and this showed an increase seen in the size of the right pleural effusion as well as ascites throughout the abdomen and pelvis along with anasarca in the subcutaneous tissues. She is currently not on any diuretics. Her blood pressure is 110/60 in the office today. She has not had any dizziness, lightheadedness or syncopal events. She denies any chest discomfort, shortness of breath, PND, orthopnea. She does have bilateral lower extremity edema. She states she was taken off of her statin due to the study drug and is concerned as she has been on it for so many years. She is trying to eat better since she is not taking any cholesterol medication. Patient with bilat foot and ankle sores that won't heal. She did have ABIs that showed no evidence of sig arterial occlusive disease BLE. She presents in NSR today. She denies any heart racing, palpitations or irregular heartbeats. She denies any breakthrough of her A. fib. She denies any chest pain, shortness of breath, PND, orthopnea or lower extremity edema. 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 She is not currently having any back pain. Home medications:   Current Outpatient Medications on File Prior to Visit   Medication Sig Dispense Refill    diphenoxylate-atropine (LOMOTIL) 2.5-0.025 MG per tablet Take 1 tablet by mouth 4 times daily as needed for Diarrhea.  ferrous sulfate (FE TABS 325) 325 (65 Fe) MG EC tablet Take 325 mg by mouth daily (with breakfast)      Olaparib 100 MG TABS Take 200 mg by mouth in the morning and at bedtime      ALPELISIB, PROS,, 50 MG DOSE, PO Take 150 mg by mouth daily      collagenase (SANTYL) 250 UNIT/GM ointment Apply to ankle ulcers once daily, keep ulcers moist, keep nearby skin dry.  30 g 0    ELIQUIS 5 MG TABS tablet TAKE ONE TABLET BY MOUTH TWICE A  tablet 3    HYDROcodone-acetaminophen  MG TABS Take by mouth. Every 6 hrs- prn      escitalopram (LEXAPRO) 10 MG tablet Take 10 mg by mouth daily      morphine (MSIR) 15 MG tablet Take 30 mg by mouth 2 times daily.  lidocaine-prilocaine (EMLA) 2.5-2.5 % cream Apply topically as needed for Pain Apply topically as needed.  ondansetron (ZOFRAN) 8 MG tablet Take 8 mg by mouth every 8 hours as needed for Nausea or Vomiting      topiramate (TOPAMAX) 25 MG tablet Take 5 mg by mouth daily       acetaminophen (TYLENOL) 500 MG tablet Take 500 mg by mouth every 6 hours as needed for Pain      nitroGLYCERIN (NITROSTAT) 0.4 MG SL tablet up to max of 3 total doses. If no relief after 1 dose, call 911. 25 tablet 3    gabapentin (NEURONTIN) 100 MG capsule Take 1 capsule by mouth 3 times daily for 90 days. 90 capsule 2    loperamide (IMODIUM) 2 MG capsule Take 2 mg by mouth in the morning, at noon, and at bedtime (Patient not taking: Reported on 6/23/2022)      MAGNESIUM OXIDE 400 PO Take 400 mg by mouth in the morning and at bedtime (Patient not taking: Reported on 6/23/2022)      bisacodyl (DULCOLAX) 5 MG EC tablet Take 1 tablet by mouth daily (Patient not taking: Reported on 6/23/2022) 60 tablet 1     No current facility-administered medications on file prior to visit.        Past Medical History:   Diagnosis Date    Atrial fibrillation ([address]     Coronary artery disease 2008    2 stents,     Hyperlipemia     Hypertension     MI, old 2008    2 stents, requiered another stent in 2009     Neuropathy     Ovarian cancer ([address] 2019        Past Surgical History:   Procedure Laterality Date    ABLATION OF DYSRHYTHMIC FOCUS  2019   Nolan Loser CARDIAC SURGERY  2008, 2009    stents X3    HYSTERECTOMY (CERVIX STATUS UNKNOWN) Bilateral 04/08/2020    DIAGNOSTIC LAPAROSCOPY,; LAPAROTOMY RESECTION OF A PELVIC MASS, COMPLETE HYSTERECTOMY, BILATERAL SALPINGO-OOPHORECTOMY, PELVIC LYMPHADENECTOMY,  OMENTECTOMY, APPENDECTOMY; RADICAL PERIMETRECTOMY; TUMOR DEBULKING performed by Yaron Palomino DO at [facility] N/A 05/11/2020    INFUSAPORT PLACEMENT UNDER FLUOROSCOPIC GUIDANCE  CPT CODE - 83338-69 performed by Yaron Palomino DO at [facility]  2008    TUBAL LIGATION         No Known Allergies    Social History:  Reviewed. reports that she has been smoking cigarettes. She has been smoking about 0.50 packs per day. She has never used smokeless tobacco. She reports previous alcohol use. She reports that she does not use drugs. Family History:  Reviewed. family history includes Heart Disease in her father and mother; High Blood Pressure in her father; Stroke in her paternal grandmother. Review of System:    · Constitutional: No fevers, chills. · Eyes: No visual changes or diplopia. No scleral icterus. · ENT: No Headaches. No mouth sores or sore throat. · Cardiovascular: No for chest pain, No for dyspnea on exertion, No for palpitations or No for loss of consciousness. No cough, hemoptysis, No for pleuritic pain, or phlebitis. · Respiratory: No for cough or wheezing. No hematemesis. · Gastrointestinal: No abdominal pain, blood in stools. · Genitourinary: No dysuria, or hematuria. · Musculoskeletal: No gait disturbance,    · Integumentary: No rash or pruritis. · Neurological: No headache, change in muscle strength, numbness or tingling. · Psychiatric: No anxiety, or depression. · Endocrine: No temperature intolerance. No excessive thirst, fluid intake, or urination. · Hem/Lymph: No abnormal bruising or bleeding, blood clots or swollen lymph nodes. · Allergic/Immunologic: No nasal congestion or hives. Physical Examination:  Vitals:    06/23/22 1108   BP: 112/64   Pulse: 69         Wt Readings from Last 3 Encounters:   06/23/22 127 lb (57.6 kg)   06/17/22 125 lb (56.7 kg)   12/20/21 150 lb 6.4 oz (68.2 kg)       · Constitutional: Oriented. No distress.    · Head: Normocephalic and atraumatic. · Mouth/Throat: Oropharynx is clear and moist.   · Eyes: Conjunctivae clear without jaunduice. PERRL. · Neck: Neck supple. No rigidity. No JVD present. · Cardiovascular: Normal rate, regular rhythm, S1&S2. · Pulmonary/Chest: Bilateral respiratory sounds. No wheezes, No rhonchi. · Abdominal: Soft. Bowel sounds present. No distension, No tenderness. · Musculoskeletal: No tenderness. No edema    · Lymphadenopathy: Has no cervical adenopathy. · Neurological: Alert and oriented. Cranial nerve appears intact, No Gross deficit   · Skin: Skin is warm and dry. No rash noted. · Psychiatric: Has a normal mood, affect and behavior     Labs:  Reviewed. No results for input(s): NA, K, CL, CO2, PHOS, BUN, CREATININE, CA in the last 72 hours. Invalid input(s):  TSH  No results for input(s): WBC, HGB, HCT, MCV, PLT in the last 72 hours. Lab Results   Component Value Date    TROPONINI <0.01 04/16/2020     No results found for: BNP  Lab Results   Component Value Date    PROTIME 18.0 06/02/2020    PROTIME 15.7 04/20/2020    PROTIME 18.1 04/17/2020    INR 1.54 06/02/2020    INR 1.35 04/20/2020    INR 1.55 04/17/2020     Lab Results   Component Value Date    CHOL 175 05/22/2014    HDL 46 05/22/2014    HDL 54 03/30/2011    TRIG 174 05/22/2014       ECG: Personally reviewed: NSR, HR 69, , QRS 98, QTc 431    ECHO:  5.19.2021  Summary   Normal left ventricular systolic function with an estimated ejection   fraction of 60-65%. No regional wall motion abnormalities are seen. Mild basal septal hypertrophy is present with normal thickness of remaining left ventricular wall segments. Grade I diastolic dysfunction with normal filling pressure. Mild posterior mitral annular calcification is present. Mild mitral regurgitation. Mild tricuspid regurgitation. Normal systolic pulmonary artery pressure (SPAP) estimated at 24 mmHg (RA pressure 3 mmHg).    4/13/2020 55-60% EF , mild MAC w mild MR    Stress Test: 4.17.2020  Summary   Dustin Spatz is normal isotope uptake at stress and rest. There is no evidence of    myocardial ischemia or scar.    The LVEF is normal at greater than 70% with normal LV wall motion.        This is a low risk cardiac scan.         Cardiac Angiography: 8.21.2008 ([facility])  This result has an attachment that is not available.      Problem List:   Patient Active Problem List    Diagnosis Date Noted    Anemia 06/17/2022    Edema of lower extremity 06/17/2022    Gastroesophageal reflux disease 06/17/2022    Peripheral neuropathy due to and not concurrent with chemotherapy ([address] 06/17/2022    Ulcer of left ankle, limited to breakdown of skin ([address] 06/17/2022    Ulcer of right ankle, with necrosis of muscle ([address] 06/17/2022    Prediabetes 10/25/2017    Pure hypercholesterolemia 11/10/2006    Paroxysmal atrial fibrillation ([address]     Atrial fibrillation with RVR ([facility] 04/16/2020    Debility 04/15/2020    Ovarian cancer on left ([address] 04/08/2020    Coronary artery disease 10/09/2012    Hyperlipidemia 10/09/2012    HTN (hypertension) 10/09/2012    Alcohol abuse 10/09/2012    Tobacco abuse 10/09/2012        Assessment:   1. PAF (paroxysmal atrial fibrillation) (HCC)    2. Paroxysmal atrial flutter (HCC)    3. Palpitations    4. On continuous oral anticoagulation    5. Essential hypertension    6. Coronary artery disease involving native coronary artery of native heart without angina pectoris        Cardiac HX: Frazier Patient is a 64 y.o. woman with a h/o HTN, HLD, TA, ETOH abuse, ovarian CA, CAD, s/p PCI (1/2008, 8/2008) who p/w a malignant neoplasm of L/R ovary, s/p resection of pelvic mass, SHARDA with BSO, noted to have AF/RVR on postop day #4, returned to NSR with 5 mg IV metoprolol and IV dig, recurrent AF/RVR in hospital (4/2020), s/p RFA for AFL, SVT (4/21/2020, Dr. Kierra Dawson), s/p RFA PVI for AF (6/5/2020, Dr. Kierra Dawson). [id number] 3.  TSH 1.20 (11.4.2020)     PAF/AFL/SVT  - In NSR, no breakthrough per pt  - On apixaban 5 mg BID - no s/s bleeding - continue  - On Toprol XL 25 mg QD  - S/p RFA for AFL and SVT (4/21/20)  - S/p RFA PVI for AF (6/5/2020)  - Monitor if s/s  - ECG completed and personally reviewed      HTN  - BP well controlled in office today  - On Toprol XL 25 mg QD  - Lifestyle modifications - smoking cessation, handout given    CAD  - No s/s  - S/p PCI (1/2008, 8/2008)  - On Eliquis, no statin d/t study drug  - Follows w/ Dr. Baljit Fair at [facility]  - Will request labs from [facility]    F/u in 6 months. EF of 60-65%  No known HF  Statin for CAD  Anticoagulation for AF       [facility]       I  have spent 30 minutes in care of the patient including direct face to face time, chart preparation, reviewing diagnostic testing, other provider notes and coordinating patient care.

## 2022-06-16 ENCOUNTER — HOSPITAL ENCOUNTER (OUTPATIENT)
Dept: CT IMAGING | Age: 62
Discharge: HOME | End: 2022-06-16
Payer: COMMERCIAL

## 2022-06-16 DIAGNOSIS — C56.1 MALIGNANT NEOPLASM OF RIGHT OVARY (HCC): ICD-10-CM

## 2022-06-16 DIAGNOSIS — Z00.6 EXAMINATION OF PARTICIPANT IN CLINICAL TRIAL: ICD-10-CM

## 2022-06-16 PROCEDURE — 84484 ASSAY OF TROPONIN QUANT: CPT

## 2022-06-16 PROCEDURE — 82803 BLOOD GASES ANY COMBINATION: CPT

## 2022-06-16 PROCEDURE — 83880 ASSAY OF NATRIURETIC PEPTIDE: CPT

## 2022-06-16 PROCEDURE — 84520 ASSAY OF UREA NITROGEN: CPT

## 2022-06-16 PROCEDURE — 6360000004 HC RX CONTRAST MEDICATION: Performed by: OBSTETRICS & GYNECOLOGY

## 2022-06-16 PROCEDURE — 83605 ASSAY OF LACTIC ACID: CPT

## 2022-06-16 PROCEDURE — A4641 RADIOPHARM DX AGENT NOC: HCPCS | Performed by: OBSTETRICS & GYNECOLOGY

## 2022-06-16 PROCEDURE — 71260 CT THORAX DX C+: CPT

## 2022-06-16 PROCEDURE — 85610 PROTHROMBIN TIME: CPT

## 2022-06-16 PROCEDURE — 85014 HEMATOCRIT: CPT

## 2022-06-16 PROCEDURE — 80051 ELECTROLYTE PANEL: CPT

## 2022-06-16 PROCEDURE — 82565 ASSAY OF CREATININE: CPT

## 2022-06-16 PROCEDURE — 82330 ASSAY OF CALCIUM: CPT

## 2022-06-16 PROCEDURE — 85347 COAGULATION TIME ACTIVATED: CPT

## 2022-06-16 RX ADMIN — BARIUM SULFATE 450 ML: 21 SUSPENSION ORAL at 11:04

## 2022-06-16 RX ADMIN — IOPAMIDOL 75 ML: 755 INJECTION, SOLUTION INTRAVENOUS at 11:05

## 2022-06-17 ENCOUNTER — HOSPITAL ENCOUNTER (OUTPATIENT)
Dept: WOUND CARE | Age: 62
Discharge: HOME | End: 2022-06-17
Payer: COMMERCIAL

## 2022-06-17 VITALS
RESPIRATION RATE: 18 BRPM | WEIGHT: 125 LBS | SYSTOLIC BLOOD PRESSURE: 152 MMHG | TEMPERATURE: 98.7 F | DIASTOLIC BLOOD PRESSURE: 73 MMHG | HEIGHT: 69 IN | HEART RATE: 80 BPM | BODY MASS INDEX: 18.51 KG/M2

## 2022-06-17 DIAGNOSIS — R60.0 EDEMA OF LOWER EXTREMITY: ICD-10-CM

## 2022-06-17 DIAGNOSIS — L97.313 ULCER OF RIGHT ANKLE, WITH NECROSIS OF MUSCLE (HCC): ICD-10-CM

## 2022-06-17 DIAGNOSIS — L97.321 ULCER OF LEFT ANKLE, LIMITED TO BREAKDOWN OF SKIN (HCC): Primary | ICD-10-CM

## 2022-06-17 PROBLEM — K21.9 GASTROESOPHAGEAL REFLUX DISEASE: Status: ACTIVE | Noted: 2022-06-17

## 2022-06-17 PROBLEM — D64.9 ANEMIA: Status: ACTIVE | Noted: 2022-06-17

## 2022-06-17 PROBLEM — R73.03 PREDIABETES: Status: ACTIVE | Noted: 2017-10-25

## 2022-06-17 PROBLEM — T45.1X5S PERIPHERAL NEUROPATHY DUE TO AND NOT CONCURRENT WITH CHEMOTHERAPY (HCC): Status: ACTIVE | Noted: 2022-06-17

## 2022-06-17 PROBLEM — G62.0 PERIPHERAL NEUROPATHY DUE TO AND NOT CONCURRENT WITH CHEMOTHERAPY (HCC): Status: ACTIVE | Noted: 2022-06-17

## 2022-06-17 PROCEDURE — 11043 DBRDMT MUSC&/FSCA 1ST 20/<: CPT

## 2022-06-17 PROCEDURE — 97597 DBRDMT OPN WND 1ST 20 CM/<: CPT

## 2022-06-17 PROCEDURE — 11043 DBRDMT MUSC&/FSCA 1ST 20/<: CPT | Performed by: INTERNAL MEDICINE

## 2022-06-17 PROCEDURE — 97597 DBRDMT OPN WND 1ST 20 CM/<: CPT | Performed by: INTERNAL MEDICINE

## 2022-06-17 PROCEDURE — 99203 OFFICE O/P NEW LOW 30 MIN: CPT | Performed by: INTERNAL MEDICINE

## 2022-06-17 PROCEDURE — 99213 OFFICE O/P EST LOW 20 MIN: CPT

## 2022-06-17 RX ORDER — BACITRACIN ZINC AND POLYMYXIN B SULFATE 500; 1000 [USP'U]/G; [USP'U]/G
OINTMENT TOPICAL ONCE
Status: CANCELLED | OUTPATIENT
Start: 2022-06-17 | End: 2022-06-17

## 2022-06-17 RX ORDER — LANOLIN ALCOHOL/MO/W.PET/CERES
325 CREAM (GRAM) TOPICAL
COMMUNITY

## 2022-06-17 RX ORDER — LOPERAMIDE HYDROCHLORIDE 2 MG/1
2 CAPSULE ORAL 3 TIMES DAILY
COMMUNITY
End: 2022-07-06

## 2022-06-17 RX ORDER — LIDOCAINE HYDROCHLORIDE 40 MG/ML
SOLUTION TOPICAL ONCE
Status: CANCELLED | OUTPATIENT
Start: 2022-06-17 | End: 2022-06-17

## 2022-06-17 RX ORDER — LIDOCAINE 50 MG/G
OINTMENT TOPICAL ONCE
Status: CANCELLED | OUTPATIENT
Start: 2022-06-17 | End: 2022-06-17

## 2022-06-17 RX ORDER — LIDOCAINE 40 MG/G
CREAM TOPICAL ONCE
Status: DISCONTINUED | OUTPATIENT
Start: 2022-06-17 | End: 2022-06-18 | Stop reason: HOSPADM

## 2022-06-17 RX ORDER — LIDOCAINE 40 MG/G
CREAM TOPICAL ONCE
Status: CANCELLED | OUTPATIENT
Start: 2022-06-17 | End: 2022-06-17

## 2022-06-17 ASSESSMENT — PAIN DESCRIPTION - LOCATION: LOCATION: FOOT

## 2022-06-17 ASSESSMENT — PAIN - FUNCTIONAL ASSESSMENT: PAIN_FUNCTIONAL_ASSESSMENT: PREVENTS OR INTERFERES SOME ACTIVE ACTIVITIES AND ADLS

## 2022-06-17 ASSESSMENT — PAIN DESCRIPTION - DESCRIPTORS: DESCRIPTORS: BURNING

## 2022-06-17 ASSESSMENT — PAIN DESCRIPTION - FREQUENCY: FREQUENCY: INTERMITTENT

## 2022-06-17 ASSESSMENT — PAIN SCALES - GENERAL: PAINLEVEL_OUTOF10: 5

## 2022-06-17 ASSESSMENT — PAIN DESCRIPTION - ORIENTATION: ORIENTATION: RIGHT;LEFT

## 2022-06-17 ASSESSMENT — PAIN DESCRIPTION - ONSET: ONSET: ON-GOING

## 2022-06-17 NOTE — PLAN OF CARE
New patient presents with atypical wounds to bilateral feet. Debridement tolerated well, will order supplies for home dressing changes from [facility]. Ej is to follow up in wound clinic in 1 week. Encouraged to decrease smoking for optimal wound healing potential as well. Discharge instructions reviewed with patient, all questions answered, copy given to patient. Dressings were applied to all wounds per M.D. Instructions at this visit.

## 2022-06-17 NOTE — PROGRESS NOTES
[address] [facility]:      [address] f: [phone] f: [phone] p: [phone] [email or web address]     Ordering Center:     Donato Recinos  [address] [facility]  [phone]  Dept: [phone]   Fax# [phone]    Patient Information:      Chucky Lipoma  [address] [address] [address]   [phone]   : 1960  AGE: 64 y.o. GENDER: female   TODAYS DATE:  2022    Insurance:      PRIMARY INSURANCE:  Plan: [facility] [employer]  Coverage: [employer] SPECIAL[employer] BILLING  Effective Date: 2022  Group Number: [unfilled]  Subscriber Number: [id number] - (Commercial)    Payor/Plan Subscr  Sex Relation Sub. Ins. ID Effective Group Num   1. [employer] SPECIALTY * KRISTINE VENEGAS 1960 Female Self [id number]                                    [facility]         Patient Wound Information:     Additional ICD-10 Codes: R60.0    Patient Active Problem List   Diagnosis Code    Coronary artery disease I25.10    Hyperlipidemia E78.5    HTN (hypertension) I10    Alcohol abuse F10.10    Tobacco abuse Z72.0    Ovarian cancer on left ([facility] C56.2    Debility R53.81    Atrial fibrillation with RVR (HCC) I48.91    Paroxysmal atrial fibrillation (HCC) I48.0    Anemia D64.9    Edema of lower extremity R60.0    Gastroesophageal reflux disease K21.9    Peripheral neuropathy due to and not concurrent with chemotherapy ([facility] G62.0, T45.1X5S    Prediabetes R73.03    Pure hypercholesterolemia E78.00       WOUNDS REQUIRING DRESSING SUPPLIES:     Wound 22 #1, Right Lateral Ankle, Atypical, Full Thickness, Onset 2022 (Active)   Wound Image   22 1241   Wound Etiology Other 22 1241   Dressing Status New dressing applied;Clean;Dry; Intact 22 1354   Wound Cleansed Soap and water 22 1241   Wound Length (cm) 0.9 cm 22 1241   Wound Width (cm) 2 cm 22 1241   Wound Depth (cm) 0.1 cm 06/17/22 1241   Wound Surface Area (cm^2) 1.8 cm^2 06/17/22 1241   Wound Volume (cm^3) 0.18 cm^3 06/17/22 1241   Wound Assessment Slough 06/17/22 1241   Drainage Amount Other (Comment) 06/17/22 1241   Drainage Description Serous 06/17/22 1241   Odor None 06/17/22 1241   Yvonne-wound Assessment Blanchable erythema 06/17/22 1241   Number of days: 0       Wound 06/17/22 #2, Left Lateral Ankle, Atypical, Full Thickness, Onset 4/2022 (Active)   Wound Image   06/17/22 1241   Wound Etiology Other 06/17/22 1241   Dressing Status New dressing applied;Clean;Dry; Intact 06/17/22 1354   Wound Cleansed Soap and water 06/17/22 1241   Wound Length (cm) 0.8 cm 06/17/22 1241   Wound Width (cm) 0.5 cm 06/17/22 1241   Wound Depth (cm) 0.1 cm 06/17/22 1241   Wound Surface Area (cm^2) 0.4 cm^2 06/17/22 1241   Wound Volume (cm^3) 0.04 cm^3 06/17/22 1241   Wound Assessment Pink/red;Slough 06/17/22 1241   Drainage Amount Other (Comment) 06/17/22 1241   Odor None 06/17/22 1241   Yvonne-wound Assessment Blanchable erythema 06/17/22 1241   Number of days: 0          Supplies Requested :      WOUND #: 1   PRIMARY DRESSING:    None   Cover and Secure with: 2X2 gauze pad  Conforming roll gauze     FREQUENCY OF DRESSING CHANGES:  Daily    Wound Thickness [x] Full   []Partial       WOUND #: 2   PRIMARY DRESSING:    None   Cover and Secure with: 2X2 gauze pad  Conforming roll gauze     FREQUENCY OF DRESSING CHANGES:  Daily    Wound Thickness [x] Full   []Partial       WOUND #: N/A   PRIMARY DRESSING:    None   Cover and Secure with: None     FREQUENCY OF DRESSING CHANGES:  Other n/a    Wound Thickness [x] Full   []Partial     Patient Wound(s) Debrided: [x] Yes   [] No    Debridement Date: 6/17/2022    Debribement Type: Excisional/Sharp    ADDITIONAL ITEMS:  [] Gloves Small  [x] Gloves Medium [] Gloves Large [] Gloves Leslie Brunner  [] Paper Tape 1\" [] Paper Tape 2\" [] Paper Tape 3\"  [] Medipore Tape 3\"  [x] Saline  [] Skin Prep   [] Adhesive Remover   [] Cotton Tip Applicators  [] Tubular Stocking   [] Size E  [] Size G  [] Other:    Patient currently being seen by Home Health: [] Yes   [x] No    Duration for needed supplies:  []15  [x]30  []60  []90 Days    Provider Information:      PROVIDER'S NAME/NPI  *Dr. Francis Comment Stefanie  NPI [id number]**  I give permission to coordinate the care for this patient

## 2022-06-23 ENCOUNTER — OFFICE VISIT (OUTPATIENT)
Dept: CARDIOLOGY CLINIC | Age: 62
End: 2022-06-23
Payer: COMMERCIAL

## 2022-06-23 VITALS
DIASTOLIC BLOOD PRESSURE: 64 MMHG | BODY MASS INDEX: 18.75 KG/M2 | HEART RATE: 69 BPM | WEIGHT: 127 LBS | SYSTOLIC BLOOD PRESSURE: 112 MMHG

## 2022-06-23 DIAGNOSIS — Z79.01 ON CONTINUOUS ORAL ANTICOAGULATION: ICD-10-CM

## 2022-06-23 DIAGNOSIS — I25.10 CORONARY ARTERY DISEASE INVOLVING NATIVE CORONARY ARTERY OF NATIVE HEART WITHOUT ANGINA PECTORIS: ICD-10-CM

## 2022-06-23 DIAGNOSIS — I10 ESSENTIAL HYPERTENSION: ICD-10-CM

## 2022-06-23 DIAGNOSIS — R00.2 PALPITATIONS: ICD-10-CM

## 2022-06-23 DIAGNOSIS — I48.0 PAF (PAROXYSMAL ATRIAL FIBRILLATION) (HCC): Primary | ICD-10-CM

## 2022-06-23 DIAGNOSIS — I48.92 PAROXYSMAL ATRIAL FLUTTER (HCC): ICD-10-CM

## 2022-06-23 PROCEDURE — G8420 CALC BMI NORM PARAMETERS: HCPCS | Performed by: NURSE PRACTITIONER

## 2022-06-23 PROCEDURE — G8427 DOCREV CUR MEDS BY ELIG CLIN: HCPCS | Performed by: NURSE PRACTITIONER

## 2022-06-23 PROCEDURE — 99214 OFFICE O/P EST MOD 30 MIN: CPT | Performed by: NURSE PRACTITIONER

## 2022-06-23 PROCEDURE — 93000 ELECTROCARDIOGRAM COMPLETE: CPT | Performed by: NURSE PRACTITIONER

## 2022-06-23 PROCEDURE — 3017F COLORECTAL CA SCREEN DOC REV: CPT | Performed by: NURSE PRACTITIONER

## 2022-06-23 PROCEDURE — 4004F PT TOBACCO SCREEN RCVD TLK: CPT | Performed by: NURSE PRACTITIONER

## 2022-06-23 RX ORDER — METOPROLOL SUCCINATE 25 MG/1
25 TABLET, EXTENDED RELEASE ORAL DAILY
Qty: 90 TABLET | Refills: 3 | Status: SHIPPED | OUTPATIENT
Start: 2022-06-23

## 2022-06-23 RX ORDER — DIPHENOXYLATE HYDROCHLORIDE AND ATROPINE SULFATE 2.5; .025 MG/1; MG/1
1 TABLET ORAL 3 TIMES DAILY
COMMUNITY

## 2022-06-24 ENCOUNTER — HOSPITAL ENCOUNTER (OUTPATIENT)
Dept: WOUND CARE | Age: 62
Discharge: HOME | End: 2022-06-24
Payer: COMMERCIAL

## 2022-06-24 VITALS
BODY MASS INDEX: 18.53 KG/M2 | RESPIRATION RATE: 20 BRPM | TEMPERATURE: 97.9 F | SYSTOLIC BLOOD PRESSURE: 114 MMHG | HEIGHT: 69 IN | DIASTOLIC BLOOD PRESSURE: 68 MMHG | HEART RATE: 69 BPM | WEIGHT: 125.13 LBS

## 2022-06-24 DIAGNOSIS — R60.0 EDEMA OF LOWER EXTREMITY: Primary | ICD-10-CM

## 2022-06-24 DIAGNOSIS — L97.322 ULCER OF LEFT ANKLE, WITH FAT LAYER EXPOSED (HCC): ICD-10-CM

## 2022-06-24 DIAGNOSIS — L97.313 ULCER OF RIGHT ANKLE, WITH NECROSIS OF MUSCLE (HCC): ICD-10-CM

## 2022-06-24 DIAGNOSIS — L97.321 ULCER OF LEFT ANKLE, LIMITED TO BREAKDOWN OF SKIN (HCC): ICD-10-CM

## 2022-06-24 PROCEDURE — 11042 DBRDMT SUBQ TIS 1ST 20SQCM/<: CPT

## 2022-06-24 PROCEDURE — 11042 DBRDMT SUBQ TIS 1ST 20SQCM/<: CPT | Performed by: INTERNAL MEDICINE

## 2022-06-24 RX ORDER — LIDOCAINE 50 MG/G
OINTMENT TOPICAL ONCE
Status: CANCELLED | OUTPATIENT
Start: 2022-06-24 | End: 2022-06-24

## 2022-06-24 RX ORDER — LIDOCAINE HYDROCHLORIDE 40 MG/ML
SOLUTION TOPICAL ONCE
Status: CANCELLED | OUTPATIENT
Start: 2022-06-24 | End: 2022-06-24

## 2022-06-24 RX ORDER — LIDOCAINE 40 MG/G
CREAM TOPICAL ONCE
Status: CANCELLED | OUTPATIENT
Start: 2022-06-24 | End: 2022-06-24

## 2022-06-24 RX ORDER — BACITRACIN ZINC AND POLYMYXIN B SULFATE 500; 1000 [USP'U]/G; [USP'U]/G
OINTMENT TOPICAL ONCE
Status: CANCELLED | OUTPATIENT
Start: 2022-06-24 | End: 2022-06-24

## 2022-06-24 NOTE — PLAN OF CARE
Wounds debrided per MD and patient tolerated well. Patient will continue with Santyl regime. Follow up in 2 weeks. Discharge instructions reviewed with patient, all questions answered, copy given to patient. Dressings were applied to all wounds per M.D. Instructions at this visit.

## 2022-07-03 PROBLEM — L97.322 ULCER OF LEFT ANKLE, WITH FAT LAYER EXPOSED (HCC): Status: ACTIVE | Noted: 2022-06-17

## 2022-07-03 NOTE — PROGRESS NOTES
[facility] Progress Note    Isabel Ashford     : 1960    DATE OF VISIT:  2022    Subjective:     Jac Mims is a 64 y.o. female who has a multifactorial ulcer located on the bilateral, lateral ankle. Significant symptoms or pertinent wound history since last visit: feeling ok overall, not much wound pain, a bit less swelling, no fever, modest drainage. Had no problem getting her Santyl, but her insurance company doesn't provide coverage for DME supplies, so she purchased some things, and we have some samples we can give her. Additional ulcer(s) noted? no.      Her current medication list consists of Alpelisib, HYDROcodone-acetaminophen, Magnesium Oxide, Olaparib, acetaminophen, apixaban, bisacodyl, collagenase, diphenoxylate-atropine, escitalopram, ferrous sulfate, gabapentin, lidocaine-prilocaine, loperamide, metoprolol succinate, morphine, nitroGLYCERIN, ondansetron, and topiramate. Allergies: Patient has no known allergies. Objective:     Vitals:    22 1122   BP: 114/68   Pulse: 69   Resp: 20   Temp: 97.9 °F (36.6 °C)   TempSrc: Oral   Weight: 125 lb 2 oz (56.8 kg)   Height: 5' 9\" (1.753 m)     Constitutional:  well-developed, well-nourished, fatigued, NAD  Psychiatric:  oriented to person, place and time; mood and affect appropriate for the situation   Cardiovascular:  bilateral pedal pulses palpable, feet warm, good cap refill; milder BL lower extremity edema, minimal changes of true venous disease  Lymphatic:  no inguinal or popliteal adenopathy, no angitis or cellulitis  Musculoskeletal:  no clubbing, cyanosis or petechiae; RLE and LLE with no gross effusions, joint misalignment or acute arthritis  Yvonne-ulcer skin: indurated, pink, maybe less tatyana, warm and dry.   Ulcer(s): left side might be a bit larger and deeper, but the right side looks a bit , with less necrotic tissue this week, still mostly fibrotic, but a few tiny new foci of red immunosuppression. Medical necessity of today's visit is shown by the above documentation. Sharp debridement is indicated today, based upon the exam findings in the wound(s) above. Procedure note:     Consent obtained. Time out performed per [facility]. Anesthetic  Anesthetic: 4% Lidocaine Cream     Using a curette, I sharply debrided the bilateral ankle ulcer(s) down through and including the removal of subcutaneous tissue. The type(s) of tissue debrided included fibrin, biofilm and necrotic/eschar. Total Surface Area Debrided: 2 sq cm. The ulcers were then irrigated with normal saline solution. The procedure was completed with a small amount of bleeding, and hemostasis was with pressure. The patient tolerated the procedure well, with no significant complications. The patient's level of pain during and after the procedure was monitored. Post-debridement measurements, if different from pre-debridement, are in the flowsheet as well. Discharge plan:     Treatment in the wound care center today, per RN documentation: Wound 06/17/22 #1, Right Lateral Ankle, Atypical, Full Thickness, Onset 4/2022-Dressing/Treatment:  (ZnO2(alfred),lidocaine/Triad,gauze, keli)  Wound 06/17/22 #2, Left Lateral Ankle, Atypical, Full Thickness, Onset 4/2022-Dressing/Treatment:  (ZnO2(alfred),lidocaine/Triad,gauze, keli). I reminded the patient of the importance of weight management and smoking cessation, if applicable; also encouraged ambulation as tolerated, additional lower extremity exercises as instructed in our education sheet, leg elevation when at rest, and compliance with any recommended dietary, diuretic and compression therapies. Home treatment: good handwashing before and after any dressing changes. Cleanse wound with saline or soap & water before dressing change. May use Vaseline (petrolatum), Aquaphor, Aveeno, CeraVe, Cetaphil, Eucerin, Lubriderm, etc for dry skin.      Dressing type for home: Periwound zinc oxide, Santyl and moist gauze and Dry cover dressing, once daily. Written discharge instructions given to patient. Follow up in 2 weeks.     Electronically signed by Gayatri Rodriguez MD on 7/3/2022 at 3:57 PM.

## 2022-07-03 NOTE — H&P
[facility] H&P Note    Kamilah Medina     : 1960    DATE OF VISIT:  2022    Subjective:     Kamilah Medina is a 64 y.o. female who has an idiopathic ulcer located on the bilateral, lateral ankle. Current complaint of pain in this ulcer? yes. Quality of pain: aching and throbbing  Timing: intermittent  Severity: mild-moderate  Associated Signs/Symptoms:  swelling and drainage (light, clear)  Other significant symptoms or pertinent ulcer history: Ms. Vicente Frias has an active diagnosis of ovarian cancer, and is currently in a clinical trial with two different chemotherapy agents. She's not had any significant trouble with nonhealing wounds in the past (pressure, edema-related, traumatic, surgical or otherwise). She doesn't recall any sort of specific wounding event for both of her ankles, but a couple of months ago started seeing small areas of discolored skin on her lateral ankles, not exactly symmetric but close, eventually with a bit of mild redness, modest drainage, and a bit of local swelling. No F/C/D, no spreading redness, no severe pain there, no pus or malodor. Recent local care has been no more than some topical antibiotic and dry dressings at times. Additional ulcer(s) noted? no.      Ms. Vicente Frias has a past medical history of Atrial fibrillation ([address] Coronary artery disease, Hyperlipemia, Hypertension, MI, old, Neuropathy, and Ovarian cancer ([address] She has a past surgical history that includes Tubal ligation; Cardiac surgery (, ); sinus surgery (); Hysterectomy (Bilateral, 2020); Port Surgery (N/A, 2020); and ablation of dysrhythmic focus (). Her family history includes Heart Disease in her father and mother; High Blood Pressure in her father; Stroke in her paternal grandmother. Ms. Vicente Frias reports that she has been smoking cigarettes. She has been smoking about 0.50 packs per day.  She has never used smokeless tobacco. She reports previous alcohol use. She reports that she does not use drugs. Her current medication list consists of Alpelisib, HYDROcodone-acetaminophen, Magnesium Oxide, Olaparib, acetaminophen, apixaban, bisacodyl, diphenoxylate-atropine, escitalopram, ferrous sulfate, gabapentin, lidocaine-prilocaine, loperamide, metoprolol succinate, morphine, nitroGLYCERIN, ondansetron, and topiramate. Allergies: Patient has no known allergies. Pertinent items from the review of systems are discussed in the HPI; the remainder of the ROS was reviewed and is negative. Objective:     Vitals:    06/17/22 1253   BP: (!) 152/73   Pulse: 80   Resp: 18   Temp: 98.7 °F (37.1 °C)   TempSrc: Oral   Weight: 125 lb (56.7 kg)   Height: 5' 9\" (1.753 m)       Constitutional:  well-developed, well-nourished, fatigued, NAD  Psychiatric:  oriented to person, place and time; mood and affect appropriate for the situation   Eyes:  pupils equal, round and reactive to light; sclerae anicteric, conjunctivae not pale  Cardiovascular:  bilateral pedal pulses palpable, feet warm, good cap refill; fairly mild BL lower extremity edema, minimal changes of true venous disease (just a few telangiectasias)  Lymphatic:  no inguinal or popliteal adenopathy, no angitis or cellulitis  Musculoskeletal:  no clubbing, cyanosis or petechiae; RLE and LLE with no gross effusions, joint misalignment or acute arthritis  Yvonne-ulcer skin: indurated, pink, tatyana, warm and dry. Ulcer(s): left side quite small, a bit of pink and red tissue, but more fibrotic than granular, some fibrin and biofilm; right side a bit larger, a bit more inflamed, with some central dry yellow eschar, depth a bit difficult to know for sure, and scant drainage; no classic signs of infection, no pathologic wound-edge inflammation. Photos also saved in electronic chart.     Today's Wound Measurements, per RN documentation:  Wound 06/17/22 #1, Right Lateral Ankle, Atypical, Full Thickness, Onset 4/2022-Wound Length (cm): 0.9 cm  Wound 06/17/22 #2, Left Lateral Ankle, Atypical, Full Thickness, Onset 4/2022-Wound Length (cm): 0.8 cm    Wound 06/17/22 #1, Right Lateral Ankle, Atypical, Full Thickness, Onset 4/2022-Wound Width (cm): 2 cm  Wound 06/17/22 #2, Left Lateral Ankle, Atypical, Full Thickness, Onset 4/2022-Wound Width (cm): 0.5 cm    Wound 06/17/22 #1, Right Lateral Ankle, Atypical, Full Thickness, Onset 4/2022-Wound Depth (cm): 0.1 cm  Wound 06/17/22 #2, Left Lateral Ankle, Atypical, Full Thickness, Onset 4/2022-Wound Depth (cm): 0.1 cm  _____________________________    Lab Results   Component Value Date    LABALBU 3.4 12/28/2021     Lab Results   Component Value Date    CREATININE 0.6 03/01/2022     Lab Results   Component Value Date    HGB 10.1 (A) 12/28/2021     Lab Results   Component Value Date    LABA1C 5.4 11/19/2012       Mar 2022 venous Doppler -- There is no evidence of deep or superficial venous thrombosis involving the right lower extremity. There is no evidence of deep or superficial venous thrombosis involving the left lower extremity. Mar 2022 right ankle XR -- No acute fractures. Osteopenia. Midfoot osteoarthritis.      Jun 2022 arterial Duplex -- Duplex scan reveals no evidence of significant arterial occlusive disease in the bilateral lower extremities.  Ankle/brachial index is within normal limits in the bilateral lower extremities.      Assessment:     Patient Active Problem List   Diagnosis Code    Coronary artery disease I25.10    Hyperlipidemia E78.5    HTN (hypertension) I10    Alcohol abuse F10.10    Tobacco abuse Z72.0    Ovarian cancer on left ([facility] [facility] C56.2    Debility R53.81    Atrial fibrillation with RVR (HCC) I48.91    Paroxysmal atrial fibrillation (HCC) I48.0    Anemia D64.9    Edema of lower extremity R60.0    Gastroesophageal reflux disease K21.9    Peripheral neuropathy due to and not concurrent with chemotherapy ([facility] [address] G62.0, T45.1X5S  Prediabetes R73.03    Pure hypercholesterolemia E78.00    Ulcer of left ankle, limited to breakdown of skin ([address] L97.321    Ulcer of right ankle, with necrosis of muscle ([address] L97.313       Assessment of today's active condition(s): idiopathic BL ankle ulcers with some necrotic tissue, no signs of infection. Doesn't have any of the classic findings of a straightforward ulcer type (not ischemic, not primarily infected, no strong signs of venous disease); suspect they might be a combination of pressure (unrecognized because of her neuropathy, edema, and effects of chemotherapy, now just with some nonspecific fibrotic changes and lack of moisture further slowing things down). Factors contributing to occurrence and/or persistence of the chronic ulcer include edema, chronic pressure, shear force and immunosuppression. Medical necessity of today's visit is shown by the above documentation. Sharp debridement is indicated today, based upon the exam findings in the ulcer(s) above. Procedure note:     Consent obtained. Time out performed per [facility] policy. Anesthetic  Anesthetic: 4% Lidocaine Cream     Using a curette, I sharply debrided the left ankle ulcer(s) down through and including the removal of dermis. The type(s) of tissue debrided included fibrin and biofilm. Total Surface Area Debrided: 1 sq cm. Using a curette, #15 blade scalpel and forceps, I sharply debrided the right ankle ulcer(s) down through and including the removal of muscle/fascia. The type(s) of tissue debrided included fibrin, slough and necrotic/eschar. Total Surface Area Debrided: 2 sq cm. This one DID go down through some necrotic fascia, but not clearly all the way to periosteum / bone. The ulcers were then irrigated with normal saline solution. The procedure was completed with a small amount of bleeding, and hemostasis was with pressure. The patient tolerated the procedure well, with no significant complications.  The patient's

## 2022-07-06 ENCOUNTER — HOSPITAL ENCOUNTER (OUTPATIENT)
Dept: WOUND CARE | Age: 62
Discharge: HOME | End: 2022-07-06
Payer: COMMERCIAL

## 2022-07-06 VITALS
HEIGHT: 69 IN | SYSTOLIC BLOOD PRESSURE: 113 MMHG | BODY MASS INDEX: 19.4 KG/M2 | TEMPERATURE: 97 F | HEART RATE: 72 BPM | WEIGHT: 131 LBS | RESPIRATION RATE: 20 BRPM | DIASTOLIC BLOOD PRESSURE: 70 MMHG

## 2022-07-06 DIAGNOSIS — L97.313 ULCER OF RIGHT ANKLE, WITH NECROSIS OF MUSCLE (HCC): ICD-10-CM

## 2022-07-06 DIAGNOSIS — R60.0 EDEMA OF LOWER EXTREMITY: Primary | ICD-10-CM

## 2022-07-06 DIAGNOSIS — L97.322 ULCER OF LEFT ANKLE, WITH FAT LAYER EXPOSED (HCC): ICD-10-CM

## 2022-07-06 LAB
GFR AFRICAN AMERICAN: >60
GFR NON-AFRICAN AMERICAN: >60
PERFORMED ON: NORMAL
POC CREATININE: 0.7 MG/DL (ref 0.6–1.2)
POC SAMPLE TYPE: NORMAL

## 2022-07-06 PROCEDURE — 11043 DBRDMT MUSC&/FSCA 1ST 20/<: CPT | Performed by: INTERNAL MEDICINE

## 2022-07-06 PROCEDURE — 11042 DBRDMT SUBQ TIS 1ST 20SQCM/<: CPT | Performed by: INTERNAL MEDICINE

## 2022-07-06 PROCEDURE — 11043 DBRDMT MUSC&/FSCA 1ST 20/<: CPT

## 2022-07-06 PROCEDURE — 29581 APPL MULTLAYER CMPRN SYS LEG: CPT

## 2022-07-06 PROCEDURE — 11042 DBRDMT SUBQ TIS 1ST 20SQCM/<: CPT

## 2022-07-06 RX ORDER — LIDOCAINE 50 MG/G
OINTMENT TOPICAL ONCE
Status: CANCELLED | OUTPATIENT
Start: 2022-07-06 | End: 2022-07-06

## 2022-07-06 RX ORDER — BACITRACIN ZINC AND POLYMYXIN B SULFATE 500; 1000 [USP'U]/G; [USP'U]/G
OINTMENT TOPICAL ONCE
Status: CANCELLED | OUTPATIENT
Start: 2022-07-06 | End: 2022-07-06

## 2022-07-06 RX ORDER — LIDOCAINE 40 MG/G
CREAM TOPICAL ONCE
Status: CANCELLED | OUTPATIENT
Start: 2022-07-06 | End: 2022-07-06

## 2022-07-06 RX ORDER — LIDOCAINE HYDROCHLORIDE 40 MG/ML
SOLUTION TOPICAL ONCE
Status: CANCELLED | OUTPATIENT
Start: 2022-07-06 | End: 2022-07-06

## 2022-07-06 RX ORDER — LIDOCAINE 50 MG/G
OINTMENT TOPICAL ONCE
Status: DISCONTINUED | OUTPATIENT
Start: 2022-07-06 | End: 2022-07-07 | Stop reason: HOSPADM

## 2022-07-06 RX ORDER — LIDOCAINE HYDROCHLORIDE 40 MG/ML
SOLUTION TOPICAL ONCE
Status: DISCONTINUED | OUTPATIENT
Start: 2022-07-06 | End: 2022-07-07 | Stop reason: HOSPADM

## 2022-07-06 RX ORDER — BACITRACIN ZINC AND POLYMYXIN B SULFATE 500; 1000 [USP'U]/G; [USP'U]/G
OINTMENT TOPICAL ONCE
Status: DISCONTINUED | OUTPATIENT
Start: 2022-07-06 | End: 2022-07-07 | Stop reason: HOSPADM

## 2022-07-06 RX ORDER — LIDOCAINE 40 MG/G
CREAM TOPICAL ONCE
Status: DISCONTINUED | OUTPATIENT
Start: 2022-07-06 | End: 2022-07-07 | Stop reason: HOSPADM

## 2022-07-06 ASSESSMENT — PAIN DESCRIPTION - DESCRIPTORS: DESCRIPTORS: BURNING;SORE

## 2022-07-06 ASSESSMENT — PAIN DESCRIPTION - LOCATION: LOCATION: FOOT

## 2022-07-06 ASSESSMENT — PAIN - FUNCTIONAL ASSESSMENT: PAIN_FUNCTIONAL_ASSESSMENT: PREVENTS OR INTERFERES SOME ACTIVE ACTIVITIES AND ADLS

## 2022-07-06 ASSESSMENT — PAIN DESCRIPTION - ONSET: ONSET: ON-GOING

## 2022-07-06 ASSESSMENT — PAIN DESCRIPTION - ORIENTATION: ORIENTATION: RIGHT

## 2022-07-06 ASSESSMENT — PAIN DESCRIPTION - FREQUENCY: FREQUENCY: INTERMITTENT

## 2022-07-06 ASSESSMENT — PAIN SCALES - GENERAL: PAINLEVEL_OUTOF10: 6

## 2022-07-06 ASSESSMENT — PAIN DESCRIPTION - PAIN TYPE: TYPE: CHRONIC PAIN

## 2022-07-06 NOTE — PLAN OF CARE
Edema increased this week despite wearing the spandagrip. Pt. Also noted to have gained 6 lbs. Bilateral ankle wounds debrided per MD. Will cont. With Santyl to bilateral ankle & right lateral foot wounds, new left pretib & right dorsal foot wounds noted-apply Ag Alginate. Will start compri 2 lite compression wraps for edema control. Reinforced importance of elevation & compression to help with circulation, edema control & wound healing. F/u in [address] [place] [address],[address] in 1 week as ordered, pt. Aware to call sooner with any changes or questions/concerns. Discharge instructions reviewed with patient, all questions answered, copy given to patient. Dressings were applied to all wounds per M.D. Instructions at this visit.

## 2022-07-08 ENCOUNTER — HOSPITAL ENCOUNTER (OUTPATIENT)
Dept: WOUND CARE | Age: 62
Discharge: HOME | End: 2022-07-08

## 2022-07-10 NOTE — PROGRESS NOTES
[facility] Progress Note    Cony Barreraic     : 1960    DATE OF VISIT:  2022    Subjective:     Danie Narvaez is a 64 y.o. female who has a presumed pressure ulcer located on the bilateral, lateral ankle. Significant symptoms or pertinent wound history since last visit: doing ok with Santyl, not much drainage, no fever, no pus, but increased BL swelling this week. Additional ulcer(s) noted? yes. Very small edema-related ulcers on the left shin and right dorsal foot, then more of a neuropathic or pressure ulcer at the right lateral 5th met head. Her current medication list consists of Alpelisib, HYDROcodone-acetaminophen, Magnesium Oxide, Olaparib, acetaminophen, apixaban, collagenase, diphenoxylate-atropine, escitalopram, ferrous sulfate, gabapentin, lidocaine-prilocaine, metoprolol succinate, morphine, nitroGLYCERIN, ondansetron, and topiramate. Allergies: Patient has no known allergies. Objective:     Vitals:    22 1440   BP: 113/70   Pulse: 72   Resp: 20   Temp: 97 °F (36.1 °C)   TempSrc: Oral   Weight: 131 lb (59.4 kg)   Height: 5' 9\" (1.753 m)     Constitutional:  well-developed, well-nourished, fatigued, NAD  Psychiatric:  oriented to person, place and time; mood and affect appropriate for the situation   Cardiovascular:  bilateral pedal pulses palpable, feet warm, good cap refill; more moderate BL lower extremity edema, minimal changes of true venous disease  Lymphatic:  no inguinal or popliteal adenopathy, no angitis or cellulitis  Musculoskeletal:  no clubbing, cyanosis or petechiae; RLE and LLE with no gross effusions, joint misalignment or acute arthritis  Yvonne-ulcer skin: indurated, pink, maybe less tatyana, warm and dry.   Ulcer(s): both original pressure ulcers still have some white to pale yellow fibronecrotic tissue, the left side into SQ fat, the right side into fascia, but at least it's softening more with the santyl; the left shin and right dorsal foot have small, partial thickness pale to pink edema-related ulcers; the right 5th met head has a bit of lysing hyperkeratotic skin, also a more superficial ulcer there (where there had previously been only some discoloration), with a bit of fibrosis, a bit of pink tissue. Photos also saved in electronic chart.     Today's wound measurements, per RN documentation:  Wound 06/17/22 #1, Right Lateral Ankle, Atypical, Full Thickness, Onset 4/2022-Wound Length (cm): 0.7 cm  Wound 06/17/22 #2, Left Lateral Ankle, Atypical, Full Thickness, Onset 4/2022-Wound Length (cm): 0.7 cm  Wound 07/06/22 #3, Right Lateral Foot, Atypical, Full Thickness, Onset 7/1/22-Wound Length (cm): 0.6 cm  Wound 07/06/22 #4, Left Pretib, Venous, Full Thickness, Onset 7/1/22-Wound Length (cm): 0.7 cm  Wound 07/06/22 #5, Right Dorsal Foot, Atypical, Full Thickness, Onset 7/1/22-Wound Length (cm): 0.4 cm    Wound 06/17/22 #1, Right Lateral Ankle, Atypical, Full Thickness, Onset 4/2022-Wound Width (cm): 1.4 cm  Wound 06/17/22 #2, Left Lateral Ankle, Atypical, Full Thickness, Onset 4/2022-Wound Width (cm): 0.5 cm  Wound 07/06/22 #3, Right Lateral Foot, Atypical, Full Thickness, Onset 7/1/22-Wound Width (cm): 0.3 cm  Wound 07/06/22 #4, Left Pretib, Venous, Full Thickness, Onset 7/1/22-Wound Width (cm): 0.5 cm  Wound 07/06/22 #5, Right Dorsal Foot, Atypical, Full Thickness, Onset 7/1/22-Wound Width (cm): 0.4 cm    Wound 06/17/22 #1, Right Lateral Ankle, Atypical, Full Thickness, Onset 4/2022-Wound Depth (cm): 0.2 cm  Wound 06/17/22 #2, Left Lateral Ankle, Atypical, Full Thickness, Onset 4/2022-Wound Depth (cm): 0.1 cm  Wound 07/06/22 #3, Right Lateral Foot, Atypical, Full Thickness, Onset 7/1/22-Wound Depth (cm): 0.1 cm  Wound 07/06/22 #4, Left Pretib, Venous, Full Thickness, Onset 7/1/22-Wound Depth (cm): 0.1 cm  Wound 07/06/22 #5, Right Dorsal Foot, Atypical, Full Thickness, Onset 7/1/22-Wound Depth (cm): 0.1 cm    Assessment:     Patient Active Problem List   Diagnosis Code    Coronary artery disease I25.10    Hyperlipidemia E78.5    HTN (hypertension) I10    Tobacco abuse Z72.0    Ovarian cancer on left (HCC) C56.2    Debility R53.81    Atrial fibrillation with RVR (HCC) I48.91    Paroxysmal atrial fibrillation (HCC) I48.0    Anemia D64.9    Edema of lower extremity R60.0    Gastroesophageal reflux disease K21.9    Peripheral neuropathy due to and not concurrent with chemotherapy ([address] G62.0, T45.1X5S    Prediabetes R73.03    Pure hypercholesterolemia E78.00    Ulcer of left ankle, with fat layer exposed ([address] L97.322    Ulcer of right ankle, with necrosis of muscle ([address] L97.313       Assessment of today's active condition(s): I think the BL lateral ankle ulcers were originally from unrecognized pressure while in bed, because of her neuropathy; starting to improve a bit with Santyl, but this week a few tiny new ulcers, I think related more to her swelling -- probably for this week it makes sense to focus more on her edema, less on daily dressing changes. Factors contributing to occurrence and/or persistence of the chronic ulcer include edema, chronic pressure, decreased mobility, shear force and immunosuppression. No signs of ischemia or infection. Medical necessity of today's visit is shown by the above documentation. Sharp debridement is indicated today, based upon the exam findings in the wound(s) above. Procedure note:     Consent obtained. Time out performed per [facility] policy. Anesthetic  Anesthetic: 4% Lidocaine Cream     Using a curette and #15 blade scalpel, I sharply debrided the left ankle ulcer(s) down through and including the removal of subcutaneous tissue. The type(s) of tissue debrided included fibrin, slough and necrotic/eschar. Total Surface Area Debrided: 1 sq cm. Using a curette and #15 blade scalpel, I sharply debrided the right ankle ulcer(s) down through and including the removal of muscle/fascia.  The type(s) of tissue debrided included fibrin, biofilm, slough and necrotic/eschar. Total Surface Area Debrided: 1 sq cm. Finally starting to see just a bit of pink and red tissue through the fibronecrotic base of each of these. The ulcers were then irrigated with normal saline solution. The procedure was completed with a small amount of bleeding, and hemostasis was with pressure. The patient tolerated the procedure well, with no significant complications. The patient's level of pain during and after the procedure was monitored. Post-debridement measurements, if different from pre-debridement, are in the flowsheet as well. Discharge plan:     Treatment in the wound care center today, per RN documentation: Wound 06/17/22 #1, Right Lateral Ankle, Atypical, Full Thickness, Onset 4/2022-Dressing/Treatment: Other (comment) (ZnO-alfred,Santyl, moistened 2x2)  Wound 06/17/22 #2, Left Lateral Ankle, Atypical, Full Thickness, Onset 4/2022-Dressing/Treatment: Other (comment) (ZnO-alfred,Santyl, moistened 2x2)  Wound 07/06/22 #3, Right Lateral Foot, Atypical, Full Thickness, Onset 7/1/22-Dressing/Treatment: Other (comment) (ZnO-alfred,Santyl, moistened 2x2)  Wound 07/06/22 #4, Left Pretib, Venous, Full Thickness, Onset 7/1/22-Dressing/Treatment: Other (comment) (Opticel Ag)  Wound 07/06/22 #5, Right Dorsal Foot, Atypical, Full Thickness, Onset 7/1/22-Dressing/Treatment: Other (comment) (opticel ag). Per physician order, bilateral application of multilayer compression wrap was performed in the wound care center today, to help manage edema, stasis dermatitis, and/or venous ulcers. Leave primary dressing and multi-layer wrap(s) in place until the next appointment. Also discussed ways to keep the wrap dry for a shower, including a plastic cast-guard, available in retail pharmacies.  She should call before her next visit if there is any significant pain, significant strike-through of drainage to the surface of the wrap, or any significant sense of the wrap sliding down more than an inch or so, bunching-up and abrading her skin. I reminded the patient of the importance of weight management and smoking cessation, if applicable; also encouraged ambulation as tolerated, additional lower extremity exercises as instructed in our education sheet, leg elevation when at rest, and compliance with any recommended dietary, diuretic and compression therapies. Keep focused on offloading, protein intake. She asked if she should stop her chemotherapy drugs because of the edema, but (a) I'm not positive the two are related, (b) being in a research study, she can't just decide to stop something, but would need to speak with the clinical trials staff, and (c) even if the chemotherapy IS contributing to her edema, I'd rather that she stay on it and we work on some compression therapy. Written discharge instructions given to patient. Follow up in 1 week.     Electronically signed by Antolin Wilkins MD on 7/10/2022 at 5:33 PM.

## 2022-07-11 NOTE — DISCHARGE INSTRUCTIONS
[facility] Physician Orders and Discharge [facility]  [address], [address]  [place], [facility]  Telephone: [phone]      Fax: [phone] home care company:   n/a     Your wound-care supplies will be provided by: patient     NAME:  Nehemias Blanco of BIRTH:  1960  PRIMARY DIAGNOSIS FOR WOUND CARE CENTER:  Atypical Wounds     Wound cleansing:   Do not scrub or use excessive force. Wash hands with soap and water before and after dressing changes. Prior to applying a clean dressing, cleanse wound with normal saline, wound cleanser, or mild soap and water. Ask your physician or nurse before getting the wound(s) wet in the shower. Wound care for home:     Right Lateral ankle and foot, Left Lateral Ankle:  MD applied felt to dorsal ankle for comfort  Vashe spray to wounds   Triad and Polysporin to wounds  Compri 2 lite compression wraps   Apply single layer light compression spandagrip to help hold dressing in place  Leave in place for the week. Keep clean, dry & intact. Your physician has ordered Compression for treatment of venous ulcers, venous insufficiency,and/or Lymphedema. * Do not remove until your next scheduled visit in [facility]- do not get wet.     * If your wrap falls down, becomes painful or you have decreased sensation, and/or excessive drainage- please call the [facility] for RN visit to get your compression wrap changed   * You need to exercice as tolerated- walking is good   * Elevate your legs preferrably above level of your heart when sitting as much as possible   * The Goal of this therapy is to reduce Edema and get into long term compression garments to control venous insufficiency, lymphedema and reduce occurrence of venous ulcers           Please note, all wounds (unless stated otherwise here) were mechanically debrided at the time of cleansing here in the wound-care center today, so a small amount of pain, drainage or bleeding from that process might be expected, and is normal.      All products for home use, including multiple products for a single wound if applicable, are medically necessary in order to achieve the best chance at timely wound healing. See provider documentation for details if needed. Substituted dressings applied in the [address] [place] [address] Avenue[address] today, if applicable:     N/a      New orders for this week (labs, imaging, medications, etc.):           Additional instructions for specific diagnoses:                 F/U Appointment is with Dr. Rhiannon Zaman in 1 week on                                                       at                                      .     Your nurse  is Marybel Mack RN. If we applied slip-resistant hospital socks today, be sure to remove them at least once a day to inspect your toes or feet, even if you're not changing the wraps or dressings underneath. If you see anything concerning (redness, excess moisture, etc), please call and let us know right away. Should you experience any significant changes in your wound(s) (including redness, increased warmth, increased pain, increased drainage, odor, or fever) or have questions about your wound care, please contact the [facility] at [phone] Monday-Thursday from 8:00 am - 4:30 pm, or Friday from 8:00 am - 2:30 pm.  If you need help with your wound outside these hours and cannot wait until we are again available, contact your home-care company (if applicable), your PCP, or go to the nearest emergency room.

## 2022-07-15 ENCOUNTER — HOSPITAL ENCOUNTER (OUTPATIENT)
Dept: WOUND CARE | Age: 62
Discharge: HOME | End: 2022-07-15
Payer: COMMERCIAL

## 2022-07-15 VITALS
SYSTOLIC BLOOD PRESSURE: 152 MMHG | DIASTOLIC BLOOD PRESSURE: 73 MMHG | HEART RATE: 71 BPM | BODY MASS INDEX: 19.4 KG/M2 | TEMPERATURE: 97 F | WEIGHT: 131 LBS | HEIGHT: 69 IN | RESPIRATION RATE: 16 BRPM

## 2022-07-15 DIAGNOSIS — L89.523 PRESSURE ULCER OF LEFT ANKLE, STAGE 3 (HCC): ICD-10-CM

## 2022-07-15 DIAGNOSIS — L97.511 ULCER OF RIGHT FOOT LIMITED TO BREAKDOWN OF SKIN (HCC): ICD-10-CM

## 2022-07-15 DIAGNOSIS — L97.322 ULCER OF LEFT ANKLE, WITH FAT LAYER EXPOSED (HCC): ICD-10-CM

## 2022-07-15 DIAGNOSIS — L97.313 ULCER OF RIGHT ANKLE, WITH NECROSIS OF MUSCLE (HCC): ICD-10-CM

## 2022-07-15 DIAGNOSIS — L89.514 PRESSURE ULCER OF RIGHT ANKLE, STAGE 4 (HCC): ICD-10-CM

## 2022-07-15 DIAGNOSIS — R60.0 EDEMA OF LOWER EXTREMITY: Primary | ICD-10-CM

## 2022-07-15 PROCEDURE — 11042 DBRDMT SUBQ TIS 1ST 20SQCM/<: CPT | Performed by: INTERNAL MEDICINE

## 2022-07-15 PROCEDURE — 11043 DBRDMT MUSC&/FSCA 1ST 20/<: CPT

## 2022-07-15 PROCEDURE — 11042 DBRDMT SUBQ TIS 1ST 20SQCM/<: CPT

## 2022-07-15 PROCEDURE — 97597 DBRDMT OPN WND 1ST 20 CM/<: CPT

## 2022-07-15 PROCEDURE — 29581 APPL MULTLAYER CMPRN SYS LEG: CPT

## 2022-07-15 PROCEDURE — 11043 DBRDMT MUSC&/FSCA 1ST 20/<: CPT | Performed by: INTERNAL MEDICINE

## 2022-07-15 PROCEDURE — 97597 DBRDMT OPN WND 1ST 20 CM/<: CPT | Performed by: INTERNAL MEDICINE

## 2022-07-15 RX ORDER — LIDOCAINE 50 MG/G
OINTMENT TOPICAL ONCE
Status: CANCELLED | OUTPATIENT
Start: 2022-07-15 | End: 2022-07-15

## 2022-07-15 RX ORDER — LIDOCAINE 40 MG/G
CREAM TOPICAL ONCE
Status: CANCELLED | OUTPATIENT
Start: 2022-07-15 | End: 2022-07-15

## 2022-07-15 RX ORDER — LIDOCAINE HYDROCHLORIDE 40 MG/ML
SOLUTION TOPICAL ONCE
Status: CANCELLED | OUTPATIENT
Start: 2022-07-15 | End: 2022-07-15

## 2022-07-15 RX ORDER — BACITRACIN ZINC AND POLYMYXIN B SULFATE 500; 1000 [USP'U]/G; [USP'U]/G
OINTMENT TOPICAL ONCE
Status: CANCELLED | OUTPATIENT
Start: 2022-07-15 | End: 2022-07-15

## 2022-07-15 RX ORDER — LIDOCAINE 40 MG/G
CREAM TOPICAL ONCE
Status: DISCONTINUED | OUTPATIENT
Start: 2022-07-15 | End: 2022-07-16 | Stop reason: HOSPADM

## 2022-07-15 NOTE — PLAN OF CARE
Patient with 2 of the 5 wounds healed. Will have compression wraps again. Follow up in wound clinic next week as ordered. MD applied felt to bilateral dorsal ankles for comfort Discharge instructions reviewed with patient, all questions answered, copy given to patient. Dressings were applied to all wounds per M.D. Instructions at this visit.

## 2022-07-18 NOTE — DISCHARGE INSTRUCTIONS
215 [facility] Physician Orders and Discharge 800 [facility]  [address], [address]  [place], [facility]  Telephone: [phone]      Fax: [phone] home care company:   n/a     Your wound-care supplies will be provided by: patient     NAME:  Santiago Buchanan of BIRTH:  1960  PRIMARY DIAGNOSIS FOR WOUND CARE CENTER:  Atypical Wounds     Wound cleansing:  Do not scrub or use excessive force. Wash hands with soap and water before and after dressing changes. Prior to applying a clean dressing, cleanse wound with normal saline, wound cleanser, or mild soap and water. Ask your physician or nurse before getting the wound(s) wet in the shower. Wound care for home:     Right and Left Lateral Ankles:  MD applied felt to dorsal ankle for comfort  Zinc Oxide to alfred wound (Desitin)  Santyl about nickel thick to wound bed then a slightly moistened gauze over [employer] with a dry dressing  Change every day  Apply medium compression Spandage      THE SMALL WOUND TO THE RIGHT DORSAL FOOT IS ALMOST HEALED JUST APPLY A SMALL DOT OF ANTIBIOTIC OINTMENT AND A DRY DRESSING/BAND AID EVERY DAY        Please note, all wounds (unless stated otherwise here) were mechanically debrided at the time of cleansing here in the wound-care center today, so a small amount of pain, drainage or bleeding from that process might be expected, and is normal.     All products for home use, including multiple products for a single wound if applicable, are medically necessary in order to achieve the best chance at timely wound healing. See provider documentation for details if needed. Substituted dressings applied in the [facility] today, if applicable:     N/a      New orders for this week (labs, imaging, medications, etc.):      WOUND CULTURE OBTAINED TODAY FROM RIGHT AND LEFT LATERAL ANKLES, DR. CERRATO IS CALLING IN AN ORAL ANTIBIOTIC TO YOUR PHARMACY, PICK THIS UP AND BEGIN TAKING AS PRESCRIBED. ONCE THE CULTURES ARE DONE IF THE ORAL ANTIBIOTIC NEEDS CHANGED DR. CERRATO WILL CALL YOU       Additional instructions for specific diagnoses:                 F/U Appointment is with Dr. Arvin Mon in 1 week on                                                       at                                      .     Your nurse  is Marybel Mack RN. If we applied slip-resistant hospital socks today, be sure to remove them at least once a day to inspect your toes or feet, even if you're not changing the wraps or dressings underneath. If you see anything concerning (redness, excess moisture, etc), please call and let us know right away. Should you experience any significant changes in your wound(s) (including redness, increased warmth, increased pain, increased drainage, odor, or fever) or have questions about your wound care, please contact the [facility] at [phone] Monday-Thursday from 8:00 am - 4:30 pm, or Friday from 8:00 am - 2:30 pm.  If you need help with your wound outside these hours and cannot wait until we are again available, contact your home-care company (if applicable), your PCP, or go to the nearest emergency room.

## 2022-07-20 ENCOUNTER — HOSPITAL ENCOUNTER (OUTPATIENT)
Dept: CT IMAGING | Age: 62
Discharge: HOME | End: 2022-07-20
Payer: COMMERCIAL

## 2022-07-20 DIAGNOSIS — C56.1 MALIGNANT NEOPLASM OF RIGHT OVARY (HCC): ICD-10-CM

## 2022-07-20 PROCEDURE — 6360000004 HC RX CONTRAST MEDICATION: Performed by: OBSTETRICS & GYNECOLOGY

## 2022-07-20 PROCEDURE — 71260 CT THORAX DX C+: CPT

## 2022-07-20 RX ADMIN — IOPAMIDOL 75 ML: 755 INJECTION, SOLUTION INTRAVENOUS at 15:25

## 2022-07-20 RX ADMIN — IOHEXOL 50 ML: 240 INJECTION, SOLUTION INTRATHECAL; INTRAVASCULAR; INTRAVENOUS; ORAL at 15:25

## 2022-07-22 ENCOUNTER — HOSPITAL ENCOUNTER (OUTPATIENT)
Dept: WOUND CARE | Age: 62
Discharge: HOME | End: 2022-07-22
Payer: COMMERCIAL

## 2022-07-22 VITALS
HEIGHT: 69 IN | HEART RATE: 77 BPM | TEMPERATURE: 97.7 F | SYSTOLIC BLOOD PRESSURE: 120 MMHG | BODY MASS INDEX: 19.35 KG/M2 | RESPIRATION RATE: 16 BRPM | DIASTOLIC BLOOD PRESSURE: 75 MMHG

## 2022-07-22 DIAGNOSIS — L97.322 ULCER OF LEFT ANKLE, WITH FAT LAYER EXPOSED (HCC): ICD-10-CM

## 2022-07-22 DIAGNOSIS — L03.115 CELLULITIS OF RIGHT ANKLE: ICD-10-CM

## 2022-07-22 DIAGNOSIS — R60.0 EDEMA OF LOWER EXTREMITY: Primary | ICD-10-CM

## 2022-07-22 DIAGNOSIS — L97.313 ULCER OF RIGHT ANKLE, WITH NECROSIS OF MUSCLE (HCC): ICD-10-CM

## 2022-07-22 PROCEDURE — 87205 SMEAR GRAM STAIN: CPT

## 2022-07-22 PROCEDURE — 11042 DBRDMT SUBQ TIS 1ST 20SQCM/<: CPT

## 2022-07-22 PROCEDURE — 99213 OFFICE O/P EST LOW 20 MIN: CPT | Performed by: INTERNAL MEDICINE

## 2022-07-22 PROCEDURE — 97597 DBRDMT OPN WND 1ST 20 CM/<: CPT | Performed by: INTERNAL MEDICINE

## 2022-07-22 PROCEDURE — 97597 DBRDMT OPN WND 1ST 20 CM/<: CPT

## 2022-07-22 PROCEDURE — 87186 SC STD MICRODIL/AGAR DIL: CPT

## 2022-07-22 PROCEDURE — 87070 CULTURE OTHR SPECIMN AEROBIC: CPT

## 2022-07-22 PROCEDURE — 87077 CULTURE AEROBIC IDENTIFY: CPT

## 2022-07-22 PROCEDURE — 11042 DBRDMT SUBQ TIS 1ST 20SQCM/<: CPT | Performed by: INTERNAL MEDICINE

## 2022-07-22 RX ORDER — LIDOCAINE 40 MG/G
CREAM TOPICAL ONCE
Status: DISCONTINUED | OUTPATIENT
Start: 2022-07-22 | End: 2022-07-23 | Stop reason: HOSPADM

## 2022-07-22 RX ORDER — AMOXICILLIN AND CLAVULANATE POTASSIUM 875; 125 MG/1; MG/1
1 TABLET, FILM COATED ORAL 2 TIMES DAILY
Qty: 14 TABLET | Refills: 0 | Status: SHIPPED | OUTPATIENT
Start: 2022-07-22 | End: 2022-07-29

## 2022-07-22 RX ORDER — LIDOCAINE 40 MG/G
CREAM TOPICAL ONCE
Status: CANCELLED | OUTPATIENT
Start: 2022-07-22 | End: 2022-07-22

## 2022-07-22 RX ORDER — LIDOCAINE 50 MG/G
OINTMENT TOPICAL ONCE
Status: CANCELLED | OUTPATIENT
Start: 2022-07-22 | End: 2022-07-22

## 2022-07-22 RX ORDER — LIDOCAINE HYDROCHLORIDE 40 MG/ML
SOLUTION TOPICAL ONCE
Status: CANCELLED | OUTPATIENT
Start: 2022-07-22 | End: 2022-07-22

## 2022-07-22 RX ORDER — BACITRACIN ZINC AND POLYMYXIN B SULFATE 500; 1000 [USP'U]/G; [USP'U]/G
OINTMENT TOPICAL ONCE
Status: CANCELLED | OUTPATIENT
Start: 2022-07-22 | End: 2022-07-22

## 2022-07-22 ASSESSMENT — PAIN DESCRIPTION - ONSET: ONSET: ON-GOING

## 2022-07-22 ASSESSMENT — PAIN DESCRIPTION - FREQUENCY: FREQUENCY: INTERMITTENT

## 2022-07-22 ASSESSMENT — PAIN DESCRIPTION - LOCATION: LOCATION: LEG

## 2022-07-22 ASSESSMENT — PAIN DESCRIPTION - ORIENTATION: ORIENTATION: RIGHT;LEFT

## 2022-07-22 ASSESSMENT — PAIN DESCRIPTION - PAIN TYPE: TYPE: CHRONIC PAIN

## 2022-07-22 ASSESSMENT — PAIN SCALES - GENERAL: PAINLEVEL_OUTOF10: 5

## 2022-07-22 ASSESSMENT — PAIN - FUNCTIONAL ASSESSMENT: PAIN_FUNCTIONAL_ASSESSMENT: PREVENTS OR INTERFERES SOME ACTIVE ACTIVITIES AND ADLS

## 2022-07-22 ASSESSMENT — PAIN DESCRIPTION - DESCRIPTORS: DESCRIPTORS: BURNING

## 2022-07-22 NOTE — PLAN OF CARE
WOUND CULTURE OBTAINED TODAY FROM RIGHT AND LEFT LATERAL ANKLES, DR. CERRATO IS CALLING IN AN ORAL ANTIBIOTIC. ONCE THE CULTURES ARE DONE IF THE ORAL ANTIBIOTIC NEEDS CHANGED DR. CERRATO WILL CALL PATIENT. Discharge instructions reviewed with patient, all questions answered, copy given to patient. Dressings were applied to all wounds per M.D. Instructions at this visit.

## 2022-07-25 NOTE — DISCHARGE INSTRUCTIONS
215 [facility] Physician Orders and Discharge 800 [facility]  [address], [address]  [place], [facility]  Telephone: [phone]      Fax: [phone] home care company:   n/a     Your wound-care supplies will be provided by: patient     NAME:  Thom Robbins of BIRTH:  1960  PRIMARY DIAGNOSIS FOR WOUND CARE CENTER:  Atypical Wounds     Wound cleansing:  Do not scrub or use excessive force. Wash hands with soap and water before and after dressing changes. Prior to applying a clean dressing, cleanse wound with normal saline, wound cleanser, or mild soap and water. Ask your physician or nurse before getting the wound(s) wet in the shower. Wound care for home:     Right Dorsal Foot:  Try to apply the Santyl to wound but if it is too hard to get it on wound then continue to apply antibioitc oitnment  Cover with a dry dressing  Change every day    Right and Left Lateral Ankles:  Spray Vashe to wund after cleansing, let sit for 1-2 minutes do not rinse  Zinc Oxide to alfred wound (Desitin)  Santyl about nickel thick to wound bed then a slightly moistened gauze over [employer] with a dry dressing  Change every day  Apply medium compression Spandage             Please note, all wounds (unless stated otherwise here) were mechanically debrided at the time of cleansing here in the wound-care center today, so a small amount of pain, drainage or bleeding from that process might be expected, and is normal.     All products for home use, including multiple products for a single wound if applicable, are medically necessary in order to achieve the best chance at timely wound healing. See provider documentation for details if needed.      Substituted dressings applied in the [facility] today, if applicable:     Triad and Lidocaine in place of Santyl today        New orders for this week (labs, imaging, medications, etc.):     STOP BY THE PHARMACY DOWN THE RING AND PURCHASE VASHE AND A BROWN GLASS SPRAY BOTTLE    FINISH YOUR ANTIBIOITCS AS PRESCRIBED        Additional instructions for specific diagnoses:      TRY TO ELEVATE AS MUCH AS YOU CAN AND AS OFTEN AS YOU CAN TO HELP WITH SWELLING, STANIDNG IN ONE SPOT FOR LONG PERIODS OR SITTING WITH YOUR FEET DOWN CONTRIBUTE TO SWELLING AND THEN CAN INCREASE YOUR PAIN           F/U Appointment is with Dr. Jerad Lopez in 1 week on                                                       at                                      .     Your nurse  is Maricarmen La RN     If we applied slip-resistant hospital socks today, be sure to remove them at least once a day to inspect your toes or feet, even if you're not changing the wraps or dressings underneath. If you see anything concerning (redness, excess moisture, etc), please call and let us know right away. Should you experience any significant changes in your wound(s) (including redness, increased warmth, increased pain, increased drainage, odor, or fever) or have questions about your wound care, please contact the [address] [place] [address] at [phone] Monday-Thursday from 8:00 am - 4:30 pm, or Friday from 8:00 am - 2:30 pm.  If you need help with your wound outside these hours and cannot wait until we are again available, contact your home-care company (if applicable), your PCP, or go to the nearest emergency room.

## 2022-07-26 LAB
GRAM STAIN RESULT: ABNORMAL
GRAM STAIN RESULT: ABNORMAL
ORGANISM: ABNORMAL
WOUND/ABSCESS: ABNORMAL

## 2022-07-26 RX ORDER — LEVOFLOXACIN 500 MG/1
500 TABLET, FILM COATED ORAL DAILY
Qty: 7 TABLET | Refills: 0 | Status: SHIPPED | OUTPATIENT
Start: 2022-07-26 | End: 2022-08-02

## 2022-07-28 PROBLEM — L89.523 PRESSURE ULCER OF LEFT ANKLE, STAGE 3 (HCC): Status: ACTIVE | Noted: 2022-06-17

## 2022-07-28 PROBLEM — L89.514 PRESSURE ULCER OF RIGHT ANKLE, STAGE 4 (HCC): Status: ACTIVE | Noted: 2022-06-17

## 2022-07-28 PROBLEM — L97.511 ULCER OF RIGHT FOOT LIMITED TO BREAKDOWN OF SKIN (HCC): Status: ACTIVE | Noted: 2022-07-28

## 2022-07-28 NOTE — PROGRESS NOTES
[facility] Progress Note    Collin Ashford     : 1960    DATE OF VISIT:  7/15/2022    Subjective:     Gurwinder Richardson is a 64 y.o. female who has a pressure ulcer located on the left, right, lateral ankle. Significant symptoms or pertinent wound history since last visit: still having some burning pain in the ankles, maybe not quite as much. Swelling definitely less this week. No F/C/D, no pruritus, tolerated wraps reasonably well, small-moderate amount of drainage, no pus. Additional ulcer(s) noted? Yes - small right lateral forefoot ulcer that I think was probably from neuropathy, recent increase in edema, shoe friction; small right dorsal foot and left shin ulcers healed this week. Her current medication list consists of Alpelisib, HYDROcodone-acetaminophen, Magnesium Oxide, Olaparib, acetaminophen, apixaban, collagenase, diphenoxylate-atropine, escitalopram, ferrous sulfate, gabapentin, lidocaine-prilocaine, metoprolol succinate, morphine, nitroGLYCERIN, ondansetron, and topiramate. Allergies: Patient has no known allergies. Objective:     Vitals:    07/15/22 1137   BP: (!) 152/73   Pulse: 71   Resp: 16   Temp: 97 °F (36.1 °C)   TempSrc: Oral   Weight: 131 lb (59.4 kg)   Height: 5' 9\" (1.753 m)     Constitutional:  well-developed, well-nourished, fatigued, NAD  Psychiatric:  oriented to person, place and time; mood and affect appropriate for the situation  Cardiovascular:  bilateral pedal pulses palpable, feet warm, good cap refill; milder BL lower extremity edema, minimal changes of true venous disease  Lymphatic:  no inguinal or popliteal adenopathy, no angitis or cellulitis  Musculoskeletal:  no clubbing, cyanosis or petechiae; RLE and LLE with no gross effusions, joint misalignment or acute arthritis  Yvonne-ulcer skin: indurated, pink, tatyana, warm and mildly macerated.   Ulcer(s): left shin and right dorsal foot delicately healed; right lateral foot looks smaller, shallow, pink-red, granular, fibrin; left ankle still mostly necrotic, into SQ layer, a bit of red tissue, fibrin, biofilm; the right ankle is qualitatively similar, but larger, down into fascial tissue, but at least the necrotic material is softening up a bit more, no pus or bone exposure. Photos also saved in electronic chart.     Today's wound measurements, per RN documentation:  Wound 07/06/22 #3, Right Lateral Foot, Neuropathic, Full Thickness, Onset 7/1/22-Wound Length (cm): 0.5 cm  [REMOVED] Wound 07/06/22 #4, Left Pretib, Venous, Full Thickness, Onset 7/1/22-Wound Length (cm): 0 cm  [REMOVED] Wound 07/06/22 #5, Right Dorsal Foot, Atypical, Full Thickness, Onset 7/1/22-Wound Length (cm): 0 cm  Wound 06/17/22 #1, Right Lateral Ankle, Pressure ulcer, stage 4, Onset 4/2022-Wound Length (cm): 1 cm  Wound 06/17/22 #2, Left Lateral Ankle, Pressure ulcer, stage 3, Onset 4/2022-Wound Length (cm): 0.8 cm    Wound 07/06/22 #3, Right Lateral Foot, Neuropathic, Full Thickness, Onset 7/1/22-Wound Width (cm): 0.4 cm  [REMOVED] Wound 07/06/22 #4, Left Pretib, Venous, Full Thickness, Onset 7/1/22-Wound Width (cm): 0 cm  [REMOVED] Wound 07/06/22 #5, Right Dorsal Foot, Atypical, Full Thickness, Onset 7/1/22-Wound Width (cm): 0 cm  Wound 06/17/22 #1, Right Lateral Ankle, Pressure ulcer, stage 4, Onset 4/2022-Wound Width (cm): 2 cm  Wound 06/17/22 #2, Left Lateral Ankle, Pressure ulcer, stage 3, Onset 4/2022-Wound Width (cm): 0.8 cm    Wound 07/06/22 #3, Right Lateral Foot, Neuropathic, Full Thickness, Onset 7/1/22-Wound Depth (cm): 0.1 cm  [REMOVED] Wound 07/06/22 #4, Left Pretib, Venous, Full Thickness, Onset 7/1/22-Wound Depth (cm): 0 cm  [REMOVED] Wound 07/06/22 #5, Right Dorsal Foot, Atypical, Full Thickness, Onset 7/1/22-Wound Depth (cm): 0 cm  Wound 06/17/22 #1, Right Lateral Ankle, Pressure ulcer, stage 4, Onset 4/2022-Wound Depth (cm): 0.2 cm  Wound 06/17/22 #2, Left Lateral Ankle, Pressure ulcer, stage 3, Onset 4/2022-Wound Depth (cm): 0.1 cm    Assessment:     Patient Active Problem List   Diagnosis Code    Coronary artery disease I25.10    Hyperlipidemia E78.5    HTN (hypertension) I10    Tobacco abuse Z72.0    Ovarian cancer on left (HCC) C56.2    Debility R53.81    Atrial fibrillation with RVR (HCC) I48.91    Paroxysmal atrial fibrillation (HCC) I48.0    Anemia D64.9    Edema of lower extremity R60.0    Gastroesophageal reflux disease K21.9    Peripheral neuropathy due to and not concurrent with chemotherapy ([facility]) G62.0, T45.1X5S    Prediabetes R73.03    Pure hypercholesterolemia E78.00    Pressure ulcer of left ankle, stage 3 (HCC) L89.523    Pressure ulcer of right ankle, stage 4 (HCC) L89.514    Ulcer of right foot lateral forefoot, limited to breakdown of skin ([facility] [address] L97.511       Assessment of today's active condition(s): ovarian cancer, on chemotherapy (clinical trial), some peripheral neuropathy and LE edema; I think unrecognized BL lateral ankle pressure during sleep because of that neuropathy, with other small ulcers related more to her edema. Better offloaded now, no signs of infection or ischemia, very tough fibronecrotic tissue starting to soften up, and her edema is better this week, which is a plus. Factors contributing to occurrence and/or persistence of the chronic ulcer include edema, chronic pressure, shear force, and immunosuppression. Medical necessity of today's visit is shown by the above documentation. Sharp debridement is indicated today, based upon the exam findings in the wound(s) above. Procedure note:     Consent obtained. Time out performed per [facility] policy. Anesthetic  Anesthetic: 4% Lidocaine Cream     Using a curette, I sharply debrided the foot (right, lateral, forefoot) ulcer(s) down through and including the removal of dermis. The type(s) of tissue debrided included fibrin and necrotic/eschar. Total Surface Area Debrided: 1 sq cm.     Using a curette, #15 blade scalpel, and forceps, I sharply debrided the right ankle ulcer(s) down through and including the removal of muscle/fascia. The type(s) of tissue debrided included fibrin, biofilm, slough, and necrotic/eschar. Total Surface Area Debrided: 2 sq cm. Using a curette, #15 blade scalpel, and forceps, I sharply debrided the left ankle ulcer(s) down through and including the removal of subcutaneous tissue. The type(s) of tissue debrided included fibrin, biofilm, and necrotic/eschar. Total Surface Area Debrided: 1 sq cm. The ulcers are just starting to look a bit more pink and red, as some of that necrotic tissue is dealt with. Slow going, but I think we're making some progress. The ulcers were then irrigated with normal saline solution. The procedure was completed with a small amount of bleeding, and hemostasis was with pressure. The patient tolerated the procedure well, with no significant complications. The patient's level of pain during and after the procedure was monitored. Post-debridement measurements, if different from pre-debridement, are in the flowsheet as well. Discharge plan:     Treatment in the wound care center today, per RN documentation: Wound 07/06/22 #3, Right Lateral Foot, Neuropathic, Full Thickness, Onset 7/1/22-Dressing/Treatment: Other (comment) (Triad/PSO to wounds, Compri 2 Lite)  Wound 06/17/22 #1, Right Lateral Ankle, Pressure ulcer, stage 4, Onset 4/2022-Dressing/Treatment: Other (comment) (Triad/PSO to wounds, Compri 2 Lite)  Wound 06/17/22 #2, Left Lateral Ankle, Pressure ulcer, stage 3, Onset 4/2022-Dressing/Treatment: Other (comment) (Triad/PSO to wounds, Compri 2 Lite). Per physician order, bilateral application of multilayer compression wrap was performed in the wound care center today, to help manage edema, stasis dermatitis, and/or venous ulcers. Leave primary dressing and multi-layer wrap(s) in place until the next appointment.  Also discussed ways to keep the wrap dry for a shower, including a plastic cast-guard, available in retail pharmacies. She should call before her next visit if there is any significant pain, significant strike-through of drainage to the surface of the wrap, or any significant sense of the wrap sliding down more than an inch or so, bunching-up and abrading her skin. I reminded the patient of the importance of weight management and smoking cessation, if applicable; also encouraged ambulation as tolerated, additional lower extremity exercises as instructed in our education sheet, leg elevation when at rest, and compliance with any recommended dietary, diuretic and compression therapies. Continue the good work of making sure her ankles are offloaded when in bed. Current surgical shoe, over her compression wrap, keeps that right forefoot ulcer offloaded when walking. Written discharge instructions given to patient. Follow up in 1 week.     Electronically signed by Greg Loco MD on 7/28/2022 at 11:04 AM.

## 2022-07-29 ENCOUNTER — HOSPITAL ENCOUNTER (OUTPATIENT)
Dept: WOUND CARE | Age: 62
Discharge: HOME | End: 2022-07-29
Payer: COMMERCIAL

## 2022-07-29 VITALS
BODY MASS INDEX: 19.7 KG/M2 | SYSTOLIC BLOOD PRESSURE: 102 MMHG | HEART RATE: 79 BPM | HEIGHT: 69 IN | WEIGHT: 133 LBS | RESPIRATION RATE: 16 BRPM | DIASTOLIC BLOOD PRESSURE: 53 MMHG | TEMPERATURE: 97.7 F

## 2022-07-29 DIAGNOSIS — L89.523 PRESSURE ULCER OF LEFT ANKLE, STAGE 3 (HCC): ICD-10-CM

## 2022-07-29 DIAGNOSIS — L97.313 ULCER OF RIGHT ANKLE, WITH NECROSIS OF MUSCLE (HCC): ICD-10-CM

## 2022-07-29 DIAGNOSIS — L89.514 PRESSURE ULCER OF RIGHT ANKLE, STAGE 4 (HCC): ICD-10-CM

## 2022-07-29 DIAGNOSIS — L97.322 ULCER OF LEFT ANKLE, WITH FAT LAYER EXPOSED (HCC): ICD-10-CM

## 2022-07-29 DIAGNOSIS — R60.0 EDEMA OF LOWER EXTREMITY: Primary | ICD-10-CM

## 2022-07-29 PROCEDURE — 97597 DBRDMT OPN WND 1ST 20 CM/<: CPT

## 2022-07-29 PROCEDURE — 11042 DBRDMT SUBQ TIS 1ST 20SQCM/<: CPT

## 2022-07-29 PROCEDURE — 97597 DBRDMT OPN WND 1ST 20 CM/<: CPT | Performed by: INTERNAL MEDICINE

## 2022-07-29 PROCEDURE — 11042 DBRDMT SUBQ TIS 1ST 20SQCM/<: CPT | Performed by: INTERNAL MEDICINE

## 2022-07-29 RX ORDER — LIDOCAINE 40 MG/G
CREAM TOPICAL ONCE
Status: CANCELLED | OUTPATIENT
Start: 2022-07-29 | End: 2022-07-29

## 2022-07-29 RX ORDER — LIDOCAINE 40 MG/G
CREAM TOPICAL ONCE
Status: DISCONTINUED | OUTPATIENT
Start: 2022-07-29 | End: 2022-07-30 | Stop reason: HOSPADM

## 2022-07-29 RX ORDER — LIDOCAINE HYDROCHLORIDE 40 MG/ML
SOLUTION TOPICAL ONCE
Status: CANCELLED | OUTPATIENT
Start: 2022-07-29 | End: 2022-07-29

## 2022-07-29 RX ORDER — BACITRACIN ZINC AND POLYMYXIN B SULFATE 500; 1000 [USP'U]/G; [USP'U]/G
OINTMENT TOPICAL ONCE
Status: CANCELLED | OUTPATIENT
Start: 2022-07-29 | End: 2022-07-29

## 2022-07-29 RX ORDER — LIDOCAINE 50 MG/G
OINTMENT TOPICAL ONCE
Status: CANCELLED | OUTPATIENT
Start: 2022-07-29 | End: 2022-07-29

## 2022-07-29 ASSESSMENT — PAIN DESCRIPTION - DESCRIPTORS: DESCRIPTORS: BURNING

## 2022-07-29 ASSESSMENT — PAIN DESCRIPTION - LOCATION: LOCATION: LEG

## 2022-07-29 ASSESSMENT — PAIN SCALES - GENERAL: PAINLEVEL_OUTOF10: 5

## 2022-07-29 ASSESSMENT — PAIN DESCRIPTION - ONSET: ONSET: ON-GOING

## 2022-07-29 ASSESSMENT — PAIN DESCRIPTION - PAIN TYPE: TYPE: CHRONIC PAIN

## 2022-07-29 ASSESSMENT — PAIN DESCRIPTION - FREQUENCY: FREQUENCY: INTERMITTENT

## 2022-07-29 ASSESSMENT — PAIN DESCRIPTION - ORIENTATION: ORIENTATION: RIGHT;LEFT

## 2022-07-29 ASSESSMENT — PAIN - FUNCTIONAL ASSESSMENT: PAIN_FUNCTIONAL_ASSESSMENT: PREVENTS OR INTERFERES SOME ACTIVE ACTIVITIES AND ADLS

## 2022-07-29 NOTE — PLAN OF CARE
Patient tolerated oral antibiotics. Edema is markedly increased but patient did daily dressings and we did not apply compression wrap last week only spandagrips. Patient was instructed on importance of elevation and compression for optimal wound healing potential.  Discharge instructions reviewed with patient, all questions answered, copy given to patient. Dressings were applied to all wounds per M.D. Instructions at this visit.

## 2022-08-01 NOTE — DISCHARGE INSTRUCTIONS
215 [facility] Physician Orders and Discharge 800 [facility]  [address], [address]  [place], [facility]  Telephone: [phone]      Fax: [phone]        Your home care company:   n/a     Your wound-care supplies will be provided by: patient     NAME:  Anderson Rounds of BIRTH:  1960  PRIMARY DIAGNOSIS FOR WOUND CARE CENTER:  Atypical Wounds     Wound cleansing:  Do not scrub or use excessive force. Wash hands with soap and water before and after dressing changes. Prior to applying a clean dressing, cleanse wound with normal saline, wound cleanser, or mild soap and water. Ask your physician or nurse before getting the wound(s) wet in the shower. Wound care for home:     Right Dorsal Foot:  Try to apply the Santyl to wound but if it is too hard to get it on wound then continue to apply antibioitc oitnment  Cover with a dry dressing  Change every day     Right and Left Lateral Ankles:  Spray Vashe to wund after cleansing, let sit for 1-2 minutes do not rinse  Zinc Oxide to alfred wound (Desitin)  Santyl about nickel thick to wound bed then a slightly moistened gauze over [employer] with a dry dressing  Change every day  Apply medium compression Spandage              Please note, all wounds (unless stated otherwise here) were mechanically debrided at the time of cleansing here in the wound-care center today, so a small amount of pain, drainage or bleeding from that process might be expected, and is normal.     All products for home use, including multiple products for a single wound if applicable, are medically necessary in order to achieve the best chance at timely wound healing. See provider documentation for details if needed.      Substituted dressings applied in the [facility] today, if applicable:     Triad and Lidocaine in place of Santyl today         New orders for this week (labs, imaging, medications, etc.):     STOP BY THE PHARMACY DOWN THE RING AND PURCHASE VASHE AND A BROWN GLASS SPRAY BOTTLE     FINISH YOUR ANTIBIOITCS AS PRESCRIBED        Additional instructions for specific diagnoses:      TRY TO ELEVATE AS MUCH AS YOU CAN AND AS OFTEN AS YOU CAN TO HELP WITH SWELLING, STANIDNG IN ONE SPOT FOR LONG PERIODS OR SITTING WITH YOUR FEET DOWN CONTRIBUTE TO SWELLING AND THEN CAN INCREASE YOUR PAIN           F/U Appointment is with Dr. Jeanette Montez in 1 week on                                                       at                                      .     Your nurse  is Jah Guevara RN     If we applied slip-resistant hospital socks today, be sure to remove them at least once a day to inspect your toes or feet, even if you're not changing the wraps or dressings underneath. If you see anything concerning (redness, excess moisture, etc), please call and let us know right away. Should you experience any significant changes in your wound(s) (including redness, increased warmth, increased pain, increased drainage, odor, or fever) or have questions about your wound care, please contact the [address] [place] [address] at [phone] Monday-Thursday from 8:00 am - 4:30 pm, or Friday from 8:00 am - 2:30 pm.  If you need help with your wound outside these hours and cannot wait until we are again available, contact your home-care company (if applicable), your PCP, or go to the nearest emergency room.

## 2022-08-05 ENCOUNTER — HOSPITAL ENCOUNTER (OUTPATIENT)
Dept: WOUND CARE | Age: 62
Discharge: HOME | End: 2022-08-05

## 2022-08-05 DIAGNOSIS — L89.514 PRESSURE ULCER OF RIGHT ANKLE, STAGE 4 (HCC): ICD-10-CM

## 2022-08-05 DIAGNOSIS — L89.523 PRESSURE ULCER OF LEFT ANKLE, STAGE 3 (HCC): ICD-10-CM

## 2022-08-05 DIAGNOSIS — R60.0 EDEMA OF LOWER EXTREMITY: Primary | ICD-10-CM

## 2022-08-08 ENCOUNTER — APPOINTMENT (OUTPATIENT)
Dept: CT IMAGING | Age: 62
End: 2022-08-08
Payer: COMMERCIAL

## 2022-08-08 ENCOUNTER — HOSPITAL ENCOUNTER (EMERGENCY)
Age: 62
Discharge: HOME | End: 2022-08-08
Attending: EMERGENCY MEDICINE
Payer: COMMERCIAL

## 2022-08-08 VITALS
RESPIRATION RATE: 16 BRPM | BODY MASS INDEX: 19.7 KG/M2 | TEMPERATURE: 98 F | WEIGHT: 130 LBS | HEART RATE: 67 BPM | HEIGHT: 68 IN | DIASTOLIC BLOOD PRESSURE: 71 MMHG | OXYGEN SATURATION: 100 % | SYSTOLIC BLOOD PRESSURE: 108 MMHG

## 2022-08-08 DIAGNOSIS — R11.2 NAUSEA AND VOMITING, INTRACTABILITY OF VOMITING NOT SPECIFIED, UNSPECIFIED VOMITING TYPE: Primary | ICD-10-CM

## 2022-08-08 DIAGNOSIS — R19.06 EPIGASTRIC MASS: ICD-10-CM

## 2022-08-08 DIAGNOSIS — R10.13 ABDOMINAL PAIN, EPIGASTRIC: ICD-10-CM

## 2022-08-08 LAB
A/G RATIO: 1 (ref 1.1–2.2)
ALBUMIN SERPL-MCNC: 3.4 G/DL (ref 3.4–5)
ALP BLD-CCNC: 98 U/L (ref 40–129)
ALT SERPL-CCNC: 11 U/L (ref 10–40)
ANION GAP SERPL CALCULATED.3IONS-SCNC: 14 MMOL/L (ref 3–16)
AST SERPL-CCNC: 18 U/L (ref 15–37)
BASOPHILS ABSOLUTE: 0 K/UL (ref 0–0.2)
BASOPHILS RELATIVE PERCENT: 0.1 %
BILIRUB SERPL-MCNC: 0.3 MG/DL (ref 0–1)
BUN BLDV-MCNC: 16 MG/DL (ref 7–20)
CALCIUM SERPL-MCNC: 9.7 MG/DL (ref 8.3–10.6)
CHLORIDE BLD-SCNC: 101 MMOL/L (ref 99–110)
CO2: 26 MMOL/L (ref 21–32)
CREAT SERPL-MCNC: 0.7 MG/DL (ref 0.6–1.2)
EOSINOPHILS ABSOLUTE: 0 K/UL (ref 0–0.6)
EOSINOPHILS RELATIVE PERCENT: 0 %
GFR AFRICAN AMERICAN: >60
GFR NON-AFRICAN AMERICAN: >60
GLUCOSE BLD-MCNC: 93 MG/DL (ref 70–99)
HCT VFR BLD CALC: 32.9 % (ref 36–48)
HEMOGLOBIN: 10.7 G/DL (ref 12–16)
LIPASE: 10 U/L (ref 13–60)
LYMPHOCYTES ABSOLUTE: 1 K/UL (ref 1–5.1)
LYMPHOCYTES RELATIVE PERCENT: 14.7 %
MAGNESIUM: 2 MG/DL (ref 1.8–2.4)
MCH RBC QN AUTO: 32.2 PG (ref 26–34)
MCHC RBC AUTO-ENTMCNC: 32.5 G/DL (ref 31–36)
MCV RBC AUTO: 99.3 FL (ref 80–100)
MONOCYTES ABSOLUTE: 0.4 K/UL (ref 0–1.3)
MONOCYTES RELATIVE PERCENT: 6.2 %
NEUTROPHILS ABSOLUTE: 5.3 K/UL (ref 1.7–7.7)
NEUTROPHILS RELATIVE PERCENT: 79 %
PDW BLD-RTO: 17 % (ref 12.4–15.4)
PLATELET # BLD: 304 K/UL (ref 135–450)
PMV BLD AUTO: 8.7 FL (ref 5–10.5)
POTASSIUM REFLEX MAGNESIUM: 3.3 MMOL/L (ref 3.5–5.1)
RBC # BLD: 3.31 M/UL (ref 4–5.2)
SARS-COV-2, NAAT: NOT DETECTED
SODIUM BLD-SCNC: 141 MMOL/L (ref 136–145)
TOTAL PROTEIN: 6.7 G/DL (ref 6.4–8.2)
WBC # BLD: 6.7 K/UL (ref 4–11)

## 2022-08-08 PROCEDURE — 83690 ASSAY OF LIPASE: CPT

## 2022-08-08 PROCEDURE — 2580000003 HC RX 258: Performed by: EMERGENCY MEDICINE

## 2022-08-08 PROCEDURE — 83735 ASSAY OF MAGNESIUM: CPT

## 2022-08-08 PROCEDURE — 6360000004 HC RX CONTRAST MEDICATION: Performed by: EMERGENCY MEDICINE

## 2022-08-08 PROCEDURE — 74177 CT ABD & PELVIS W/CONTRAST: CPT

## 2022-08-08 PROCEDURE — 96375 TX/PRO/DX INJ NEW DRUG ADDON: CPT

## 2022-08-08 PROCEDURE — 96374 THER/PROPH/DIAG INJ IV PUSH: CPT

## 2022-08-08 PROCEDURE — 87635 SARS-COV-2 COVID-19 AMP PRB: CPT

## 2022-08-08 PROCEDURE — 99285 EMERGENCY DEPT VISIT HI MDM: CPT

## 2022-08-08 PROCEDURE — 85025 COMPLETE CBC W/AUTO DIFF WBC: CPT

## 2022-08-08 PROCEDURE — 6360000002 HC RX W HCPCS: Performed by: EMERGENCY MEDICINE

## 2022-08-08 PROCEDURE — 80053 COMPREHEN METABOLIC PANEL: CPT

## 2022-08-08 RX ORDER — MORPHINE SULFATE 4 MG/ML
4 INJECTION, SOLUTION INTRAMUSCULAR; INTRAVENOUS ONCE
Status: COMPLETED | OUTPATIENT
Start: 2022-08-08 | End: 2022-08-08

## 2022-08-08 RX ORDER — ONDANSETRON 4 MG/1
4 TABLET, ORALLY DISINTEGRATING ORAL EVERY 8 HOURS PRN
Qty: 36 TABLET | Refills: 0 | Status: SHIPPED | OUTPATIENT
Start: 2022-08-08 | End: 2022-08-20

## 2022-08-08 RX ORDER — 0.9 % SODIUM CHLORIDE 0.9 %
1000 INTRAVENOUS SOLUTION INTRAVENOUS ONCE
Status: COMPLETED | OUTPATIENT
Start: 2022-08-08 | End: 2022-08-08

## 2022-08-08 RX ORDER — ONDANSETRON 2 MG/ML
4 INJECTION INTRAMUSCULAR; INTRAVENOUS ONCE
Status: COMPLETED | OUTPATIENT
Start: 2022-08-08 | End: 2022-08-08

## 2022-08-08 RX ADMIN — MORPHINE SULFATE 4 MG: 4 INJECTION, SOLUTION INTRAMUSCULAR; INTRAVENOUS at 13:32

## 2022-08-08 RX ADMIN — IOPAMIDOL 75 ML: 755 INJECTION, SOLUTION INTRAVENOUS at 14:20

## 2022-08-08 RX ADMIN — SODIUM CHLORIDE 1000 ML: 9 INJECTION, SOLUTION INTRAVENOUS at 13:31

## 2022-08-08 RX ADMIN — ONDANSETRON 4 MG: 2 INJECTION INTRAMUSCULAR; INTRAVENOUS at 13:32

## 2022-08-08 ASSESSMENT — PAIN - FUNCTIONAL ASSESSMENT
PAIN_FUNCTIONAL_ASSESSMENT: NONE - DENIES PAIN
PAIN_FUNCTIONAL_ASSESSMENT: NONE - DENIES PAIN
PAIN_FUNCTIONAL_ASSESSMENT: 0-10

## 2022-08-08 ASSESSMENT — PAIN DESCRIPTION - LOCATION: LOCATION: ABDOMEN

## 2022-08-08 ASSESSMENT — PAIN DESCRIPTION - ORIENTATION: ORIENTATION: MID;UPPER

## 2022-08-08 ASSESSMENT — PAIN DESCRIPTION - ONSET: ONSET: ON-GOING

## 2022-08-08 ASSESSMENT — PAIN DESCRIPTION - FREQUENCY: FREQUENCY: CONTINUOUS

## 2022-08-08 ASSESSMENT — PAIN SCALES - GENERAL
PAINLEVEL_OUTOF10: 10
PAINLEVEL_OUTOF10: 10

## 2022-08-08 NOTE — DISCHARGE INSTRUCTIONS
215 [facility] Physician Orders and Discharge 800 [facility]  [address], [address]  [place], [facility]  Telephone: [phone]      Fax: [phone]        Your home care company:   n/a     Your wound-care supplies will be provided by: patient     NAME:  Debi Hamilton of BIRTH:  1960  PRIMARY DIAGNOSIS FOR WOUND CARE CENTER:  Atypical Wounds     Wound cleansing:  Do not scrub or use excessive force. Wash hands with soap and water before and after dressing changes. Prior to applying a clean dressing, cleanse wound with normal saline, wound cleanser, or mild soap and water. Ask your physician or nurse before getting the wound(s) wet in the shower. Wound care for home:     Right Dorsal Foot:  Try to apply the Santyl to wound but if it is too hard to get it on wound then continue to apply antibioitc oitnment  Cover with a dry dressing  Change every day     Right and Left Lateral Ankles:  Spray Vashe to wund after cleansing, let sit for 1-2 minutes do not rinse  Zinc Oxide to alfred wound (Desitin)  Santyl about nickel thick to wound bed then a slightly moistened gauze over [employer] with a dry dressing  Change every day  Apply medium compression Spandage              Please note, all wounds (unless stated otherwise here) were mechanically debrided at the time of cleansing here in the wound-care center today, so a small amount of pain, drainage or bleeding from that process might be expected, and is normal.     All products for home use, including multiple products for a single wound if applicable, are medically necessary in order to achieve the best chance at timely wound healing. See provider documentation for details if needed.      Substituted dressings applied in the [facility] today, if applicable:     Triad and Lidocaine in place of Santyl today         New orders for this week (labs, imaging, medications, etc.):     STOP BY THE PHARMACY DOWN THE RING AND PURCHASE VASHE AND A BROWN GLASS SPRAY BOTTLE     FINISH YOUR ANTIBIOITCS AS PRESCRIBED        Additional instructions for specific diagnoses:      TRY TO ELEVATE AS MUCH AS YOU CAN AND AS OFTEN AS YOU CAN TO HELP WITH SWELLING, STANIDNG IN ONE SPOT FOR LONG PERIODS OR SITTING WITH YOUR FEET DOWN CONTRIBUTE TO SWELLING AND THEN CAN INCREASE YOUR PAIN           F/U Appointment is with Dr. Naomi Ware in 1 week on                                                       at                                      .     Your nurse  is James Mcnulty RN     If we applied slip-resistant hospital socks today, be sure to remove them at least once a day to inspect your toes or feet, even if you're not changing the wraps or dressings underneath. If you see anything concerning (redness, excess moisture, etc), please call and let us know right away. Should you experience any significant changes in your wound(s) (including redness, increased warmth, increased pain, increased drainage, odor, or fever) or have questions about your wound care, please contact the [address] [place] [address] at [phone] Monday-Thursday from 8:00 am - 4:30 pm, or Friday from 8:00 am - 2:30 pm.  If you need help with your wound outside these hours and cannot wait until we are again available, contact your home-care company (if applicable), your PCP, or go to the nearest emergency room.

## 2022-08-08 NOTE — ED TRIAGE NOTES
Was on antibiotics for infection in ankle started 14 days ago. Pt finished 4 days ago, pain in abdomen starting following day. Emesis last night, relief with 8 mg Zofran given in squad. Pt had mass in upper abdomen for last month, being evaluated by [facility]. Pain in this location.

## 2022-08-10 PROBLEM — L03.115 CELLULITIS OF RIGHT ANKLE: Status: ACTIVE | Noted: 2022-08-10

## 2022-08-10 NOTE — PROGRESS NOTES
[facility] Progress Note    Dank Ashford     : 1960    DATE OF VISIT:  2022    Subjective:     Gilson Luther is a 64 y.o. female who has a pressure ulcer located on the bilateral, lateral ankle. Significant symptoms or pertinent wound history since last visit: swelling might be up more again this week with less compression in place, but she's staying well offloaded, doing well with dressing changes, has a bit less pain. No F/C/D. Tolerating Abx ok, no sore throat or mouth, N/V/D, rash. I had to call her on Monday to add  levofloxacin to the Augmentin, based on Cx results. Additional ulcer(s) noted? Yes - the small right forefoot neuropathic ulcer. Her current medication list consists of Alpelisib, HYDROcodone-acetaminophen, Magnesium Oxide, Olaparib, acetaminophen, apixaban, collagenase, diphenoxylate-atropine, escitalopram, ferrous sulfate, gabapentin, lidocaine-prilocaine, metoprolol succinate, morphine, nitroGLYCERIN, ondansetron, and topiramate. Allergies: Patient has no known allergies.     Objective:     Vitals:    22 1118 22 1126   BP: (!) 102/53    Pulse: 79    Resp: 16    Temp: 97.7 °F (36.5 °C)    TempSrc: Oral    Weight:  133 lb (60.3 kg)   Height:  5' 9\" (1.753 m)     Constitutional:  well-developed, well-nourished, fatigued, in less pain, not ill appearing  No icterus, thrush, drug rash, abd tenderness  Cardiovascular:  bilateral pedal pulses palpable, feet warm, good cap refill; mod BL lower extremity edema, minimal changes of true venous disease  Lymphatic:  no inguinal or popliteal adenopathy, no angitis, and I think her cellulitis is basically resolved this week  Musculoskeletal:  no clubbing, cyanosis or petechiae; RLE and LLE with no gross effusions, joint misalignment or acute arthritis  Yvonne-ulcer skin: indurated, pink, warm and less macerated, less tender than last week  Ulcer(s): right lateral foot looks small, shallow, pink-red, granular, fibrin; left ankle partly necrotic, starting to granulate more, fibrin, biofilm; the right ankle might be smaller this week, some fibronecrotic SQ tissue, a bit of new granulation, less inflamed, some fibrin and biofilm, serous exudate, less tender, no joint or bone exposure, no pus or malodor. Photos also saved in electronic chart. Today's wound measurements, per RN documentation:  Wound 07/06/22 #3, Right Lateral Foot, Neuropathic, Full Thickness, Onset 7/1/22-Wound Length (cm): 0.4 cm  Wound 06/17/22 #1, Right Lateral Ankle, Pressure ulcer, stage 4, Onset 4/2022-Wound Length (cm): 1 cm  Wound 06/17/22 #2, Left Lateral Ankle, Pressure ulcer, stage 3, Onset 4/2022-Wound Length (cm): 1 cm    Wound 07/06/22 #3, Right Lateral Foot, Neuropathic, Full Thickness, Onset 7/1/22-Wound Width (cm): 0.2 cm  Wound 06/17/22 #1, Right Lateral Ankle, Pressure ulcer, stage 4, Onset 4/2022-Wound Width (cm): 2 cm  Wound 06/17/22 #2, Left Lateral Ankle, Pressure ulcer, stage 3, Onset 4/2022-Wound Width (cm): 0.7 cm    Wound 07/06/22 #3, Right Lateral Foot, Neuropathic, Full Thickness, Onset 7/1/22-Wound Depth (cm): 0.1 cm  Wound 06/17/22 #1, Right Lateral Ankle, Pressure ulcer, stage 4, Onset 4/2022-Wound Depth (cm): 0.3 cm  Wound 06/17/22 #2, Left Lateral Ankle, Pressure ulcer, stage 3, Onset 4/2022-Wound Depth (cm): 0.1 cm  _______________    WCx from last week --  Both sides with Pseudomonas and MSSA.     Assessment:     Patient Active Problem List   Diagnosis Code    Coronary artery disease I25.10    Hyperlipidemia E78.5    HTN (hypertension) I10    Tobacco abuse Z72.0    Ovarian cancer on left (HCC) C56.2    Debility R53.81    Atrial fibrillation with RVR (HCC) I48.91    Paroxysmal atrial fibrillation (HCC) I48.0    Anemia D64.9    Edema of lower extremity R60.0    Gastroesophageal reflux disease K21.9    Peripheral neuropathy due to and not concurrent with chemotherapy ([facility] [address] G62.0, T45.1X5S    Prediabetes R73.03    Pure hypercholesterolemia E78.00    Pressure ulcer of left ankle, stage 3 ([facility]) L89.523    Pressure ulcer of right ankle, stage 4 ([facility]) L89.514    Ulcer of right foot lateral forefoot, limited to breakdown of skin ([facility]) L97.511    Cellulitis of right ankle L03.115       Assessment of today's active condition(s): ovarian cancer, getting chemotherapy, some chronic leg edema and peripheral neuropathy, decreased mobility, BL ankle pressure ulcers; also a small right foot ulcer that started when she became more swollen weeks ago. Complicated by soft tissue infection last week, but that seems to be improving this week; very well offloaded now that she is careful with positioning in bed (she used to sleep on her sides, had chronic malleolar pressure against her mattress, which she didn't recognize because of the neuropathy). Factors contributing to occurrence and/or persistence of the chronic ulcer include edema, chronic pressure, decreased mobility, and immunosuppression. Medical necessity of today's visit is shown by the above documentation. Sharp debridement is indicated today, based upon the exam findings in the wound(s) above. Procedure note:     Consent obtained. Time out performed per [address] [place] [address]  policy. Anesthetic  Anesthetic: 4% Lidocaine Cream     Using a curette and #15 blade scalpel, I sharply debrided the left ankle and foot (right, forefoot) ulcer(s) down through and including the removal of dermis. The type(s) of tissue debrided included fibrin and biofilm. Total Surface Area Debrided: 1 sq cm. Using a curette, #15 blade scalpel, and forceps, I sharply debrided the right, lateral ankle ulcer(s) down through and including the removal of subcutaneous tissue. The type(s) of tissue debrided included fibrin, biofilm, and necrotic/eschar. Total Surface Area Debrided: 2 sq cm. The ulcers were then irrigated with normal saline solution.  The procedure was completed with a small amount of bleeding, and hemostasis was with pressure. The patient tolerated the procedure well, with no significant complications. The patient's level of pain during and after the procedure was monitored. Post-debridement measurements, if different from pre-debridement, are in the flowsheet as well. Discharge plan:     Treatment in the wound care center today, per RN documentation: Wound 07/06/22 #3, Right Lateral Foot, Neuropathic, Full Thickness, Onset 7/1/22-Dressing/Treatment:  (PSO, dry dressing)  Wound 06/17/22 #1, Right Lateral Ankle, Pressure ulcer, stage 4, Onset 4/2022-Dressing/Treatment:  (PSO, Zno2(alfred),gauze, keli spandigrip)  Wound 06/17/22 #2, Left Lateral Ankle, Pressure ulcer, stage 3, Onset 4/2022-Dressing/Treatment:  (PSO, Zno2(alfred),gauze, keli spandigrip). Complete Abx as prescribed. I reminded the patient of the importance of weight management and smoking cessation, if applicable; also encouraged ambulation as tolerated, additional lower extremity exercises as instructed in our education sheet, leg elevation when at rest, and compliance with any recommended dietary, diuretic and compression therapies. Home treatment: good handwashing before and after any dressing changes. Cleanse wound with saline or soap & water before dressing change. May use Vaseline (petrolatum), Aquaphor, Aveeno, CeraVe, Cetaphil, Eucerin, Lubriderm, etc for dry skin. Dressing type for home:  Vashe to all; PSO and a dry dressing to the right foot; periwound ZnO, then santyl, moist gauze and dry gauze to the ankles, Spandagrip , once daily. Written discharge instructions given to patient. Follow up in 1 week.     Electronically signed by Raisa Monge MD on 8/10/2022 at 3:37 PM.

## 2022-08-10 NOTE — PROGRESS NOTES
[facility] Progress Note    Antonio Ashford     : 1960    DATE OF VISIT:  2022    Subjective:     Cathy Caceres is a 64 y.o. female who has a presumed pressure ulcer located on the bilateral, lateral ankle. Current complaint of pain in this ulcer? yes, R>L. Quality of pain: aching, sharp, and throbbing  Timing: intermittent and increasing in frequency  Severity: moderate  Associated Signs/Symptoms:  swelling, redness, and drainage (moderate, clear)  Other significant symptoms or pertinent ulcer history: though the wound beds seem to be doing a bit better after sharp debridements, Santyl and a couple of weeks of compression, she has increased pain this week, increased drainage, a bit more redness, mostly on the right side. No F/C/D, no pus or malodor. Additional ulcer(s) noted? yes. Right lateral forefoot presumed neuropathic ulcer still present, but small. Ms. Karyn Ramirez has a past medical history of Alcohol abuse, Atrial fibrillation ([facility] [address] Coronary artery disease, Hyperlipemia, Hypertension, MI, old, Neuropathy, and Ovarian cancer ([facility] [address] She has a past surgical history that includes Tubal ligation; Cardiac surgery (, ); sinus surgery (); Hysterectomy (Bilateral, 2020); Port Surgery (N/A, 2020); and ablation of dysrhythmic focus (). Her family history includes Heart Disease in her father and mother; High Blood Pressure in her father; Stroke in her paternal grandmother. Ms. Karyn Ramirez reports that she has been smoking cigarettes. She has been smoking an average of .5 packs per day. She has never used smokeless tobacco. She reports that she does not currently use alcohol. She reports that she does not use drugs.     Her current medication list consists of Alpelisib, HYDROcodone-acetaminophen, Magnesium Oxide, Olaparib, acetaminophen, apixaban, collagenase, diphenoxylate-atropine, escitalopram, ferrous sulfate, gabapentin, lidocaine-prilocaine, metoprolol succinate, morphine, nitroGLYCERIN, ondansetron, and topiramate. Allergies: Patient has no known allergies. Pertinent items from the review of systems are discussed in the HPI; the remainder of the ROS was reviewed and is negative. Objective:     Vitals:    07/22/22 1110   BP: 120/75   Pulse: 77   Resp: 16   Temp: 97.7 °F (36.5 °C)   TempSrc: Oral   Weight: Comment: weight not obtained today   Height: 5' 9\" (1.753 m)       Constitutional:  well-developed, well-nourished, fatigued, in some pain, not ill appearing  Psychiatric:  oriented to person, place and time; mood and affect appropriate for the situation  Cardiovascular:  bilateral pedal pulses palpable, feet warm, good cap refill; mild-mod BL lower extremity edema, minimal changes of true venous disease  Lymphatic:  no inguinal or popliteal adenopathy, no angitis, but I think a localized cellulitis around the right ankle ulcer this week, maybe very mildly so on the left side too  Musculoskeletal:  no clubbing, cyanosis or petechiae; RLE and LLE with no gross effusions, joint misalignment or acute arthritis  Yvonne-ulcer skin: indurated, pink to red, warm and mildly macerated, more tender than last week  Ulcer(s): left shin and right dorsal foot healed; right lateral foot looks smaller, shallow, pink-red, granular, fibrin; left ankle partly necrotic, starting to granulate more, fibrin, biofilm; the right ankle might be larger this week, some fibronecrotic SQ tissue, more inflamed, some fibrin and biofilm, more serous exudate, more tender, no joint or bone exposure, no pus or malodor. Photos also saved in electronic chart.     Today's Wound Measurements, per RN documentation:  Wound 07/06/22 #3, Right Lateral Foot, Neuropathic, Full Thickness, Onset 7/1/22-Wound Length (cm): 0.4 cm  Wound 06/17/22 #2, Left Lateral Ankle, Pressure ulcer, stage 3, Onset 4/2022-Wound Length (cm): 0.7 cm  Wound 06/17/22 #1, Right Lateral Ankle, Pressure ulcer, stage 4, Onset 4/2022-Wound Length (cm): 1 cm    Wound 07/06/22 #3, Right Lateral Foot, Neuropathic, Full Thickness, Onset 7/1/22-Wound Width (cm): 0.3 cm  Wound 06/17/22 #2, Left Lateral Ankle, Pressure ulcer, stage 3, Onset 4/2022-Wound Width (cm): 0.7 cm  Wound 06/17/22 #1, Right Lateral Ankle, Pressure ulcer, stage 4, Onset 4/2022-Wound Width (cm): 1.9 cm    Wound 07/06/22 #3, Right Lateral Foot, Neuropathic, Full Thickness, Onset 7/1/22-Wound Depth (cm): 0.1 cm  Wound 06/17/22 #2, Left Lateral Ankle, Pressure ulcer, stage 3, Onset 4/2022-Wound Depth (cm): 0.1 cm  Wound 06/17/22 #1, Right Lateral Ankle, Pressure ulcer, stage 4, Onset 4/2022-Wound Depth (cm): 0.3 cm  _____________________________    Lab Results   Component Value Date    LABALBU 3.4 08/08/2022     Lab Results   Component Value Date    CREATININE 0.7 08/08/2022     Lab Results   Component Value Date    HGB 10.7 (L) 08/08/2022     Lab Results   Component Value Date    LABA1C 5.4 11/19/2012     Assessment:     Patient Active Problem List   Diagnosis Code    Coronary artery disease I25.10    Hyperlipidemia E78.5    HTN (hypertension) I10    Tobacco abuse Z72.0    Ovarian cancer on left (HCC) C56.2    Debility R53.81    Atrial fibrillation with RVR ([facility]) I48.91    Paroxysmal atrial fibrillation ([facility]) I48.0    Anemia D64.9    Edema of lower extremity R60.0    Gastroesophageal reflux disease K21.9    Peripheral neuropathy due to and not concurrent with chemotherapy ([facility] [address] G62.0, T45.1X5S    Prediabetes R73.03    Pure hypercholesterolemia E78.00    Pressure ulcer of left ankle, stage 3 (HCC) L89.523    Pressure ulcer of right ankle, stage 4 ([facility]) L89.514    Ulcer of right foot lateral forefoot, limited to breakdown of skin ([facility]) L97.511    Cellulitis of right ankle L03.115       Assessment of today's active condition(s): ovarian cancer, on chemotherapy, also with some LE neuropathy and edema, I think pressure injuries started the ankle ulcers BL, and then her edema and neuropathy and a bit of shoe friction led to that right forefoot ulcer; no signs of ischemia, no classic pathologic inflammation (like pyoderma or vasculitis); recent increase in edema, improved with compression, but now this week a soft tissue infection at least on the right side; as much as I think the compression helped, I think with the infection and increased drainage / persistent necrosis this week, she would be better served by going back to daily dressing changes at home. Factors contributing to occurrence and/or persistence of the chronic ulcer include edema, chronic pressure, decreased mobility, shear force, and immunosuppression. Medical necessity of today's visit is shown by the above documentation. Sharp debridement is indicated today, based upon the exam findings in the ulcer(s) above. Procedure note:     Consent obtained. Time out performed per [facility] policy. Anesthetic  Anesthetic: 4% Lidocaine Cream     Using a curette and #15 blade scalpel, I sharply debrided the left ankle and foot (right, forefoot) ulcer(s) down through and including the removal of dermis. The type(s) of tissue debrided included fibrin, biofilm, and necrotic/eschar. Total Surface Area Debrided: 1 sq cm. Using a curette, #15 blade scalpel, and forceps, I sharply debrided the right ankle ulcer(s) down through and including the removal of subcutaneous tissue. The type(s) of tissue debrided included fibrin, biofilm, and necrotic/eschar. Total Surface Area Debrided: 2 sq cm. The ulcers were then irrigated with normal saline solution. The procedure was completed with a small amount of bleeding, and hemostasis was with pressure. The patient tolerated the procedure well, with no significant complications. The patient's level of pain during and after the procedure was monitored. Post-debridement measurements, if different from pre-debridement, are in the flowsheet as well.      Discharge plan: Treatment in the wound care center today, per RN documentation: Wound 07/06/22 #3, Right Lateral Foot, Neuropathic, Full Thickness, Onset 7/1/22-Dressing/Treatment: Other (comment) (polysporin,dry dressing)  Wound 06/17/22 #2, Left Lateral Ankle, Pressure ulcer, stage 3, Onset 4/2022-Dressing/Treatment: Other (comment) (ZnO alfred,santyl,saline moistened 2x2,dry dressing, single \"D\")  Wound 06/17/22 #1, Right Lateral Ankle, Pressure ulcer, stage 4, Onset 4/2022-Dressing/Treatment: Other (comment) (ZnO alfred,santyl,saline moistened 2x2,dry dressing, single \"D\"). I took a couple of ankle wound cultures today. Empiric Augmentin for now, to cover at least Strep, MSSA, a couple of GNRs. I'll call her on Monday with results, and Abx updates if needed. I reminded the patient of the importance of weight management and smoking cessation, if applicable; also encouraged ambulation as tolerated, additional lower extremity exercises as instructed in our education sheet, leg elevation when at rest, and compliance with any recommended dietary, diuretic and compression therapies. Back to just Spandagrip (and elevation / exercise) for edema control for now. Home treatment: good handwashing before and after any dressing changes. Cleanse ulcer with saline or soap & water before dressing change. May use Vaseline (petrolatum), Aquaphor, Aveeno, CeraVe, Cetaphil, Eucerin, Lubriderm, etc for dry skin. Dressing type for home:  Vashe, then as above , once daily. Written discharge instructions given to patient. Follow up in 1 week.     Electronically signed by Nathaniel Jose MD on 8/10/2022 at 2:01 PM.

## 2022-08-11 ASSESSMENT — ENCOUNTER SYMPTOMS
SHORTNESS OF BREATH: 0
ABDOMINAL PAIN: 1
DIARRHEA: 0
CHEST TIGHTNESS: 0
VOMITING: 1
NAUSEA: 1
RHINORRHEA: 0
BACK PAIN: 0
COUGH: 0

## 2022-08-11 NOTE — ED PROVIDER NOTES
Emergency Department Provider Note  Location: [address] [place] [address]  ED  8/8/2022     Patient Identification  Rosetta Ray is a 64 y.o. female    Chief Complaint  Abdominal Pain (X3 days in mid-upper abdomen. Emesis starting last night, EMS gave 8 mg Zofran enroute. Hx of ovarian cancer)      HPI    (History provided by patient and family member patient and grandson)    Patient is a 64 y.o. female with a significant PMHx of ovarian cancer with metastases, recently finishing a experimental chemotherapy 3 weeks ago, CAD, hyperlipidemia, and other comorbidities as listed below, that presents emergency department today with abdominal pain, pointing to her epigastric and subxiphoid region, and episode of vomiting last night, and another this morning. Patient states that she feels like there is a lump at the level of her xiphoid/epigastrium, and she feels like could be blocking food. She is experiencing early satiety more frequently, and is now vomiting. She denies any falls, injuries. Is tolerating secretions, not having difficulty swallowing. No back pain. Has Zofran at home as well as pain regiment. Normal bowel movements. Denies any chest pain or shortness of breath. Acting at her baseline per grandson who is bedside. I have reviewed the following nursing documentation:  Allergies: No Known Allergies    Past medical history:  has a past medical history of Alcohol abuse (10/9/2012), Atrial fibrillation ([address] Coronary artery disease (2008), Hyperlipemia, Hypertension, MI, old (2008), Neuropathy, and Ovarian cancer ([address] (2019). Past surgical history:  has a past surgical history that includes Tubal ligation; Cardiac surgery (2008, 2009); sinus surgery (2008); Hysterectomy (Bilateral, 04/08/2020); Port Surgery (N/A, 05/11/2020); and ablation of dysrhythmic focus (2019). Home medications:   Prior to Admission medications    Medication Sig Start Date End Date Taking?  Authorizing Provider ondansetron (ZOFRAN ODT) 4 MG disintegrating tablet Take 1 tablet by mouth every 8 hours as needed for Nausea or Vomiting 8/8/22 8/20/22 Yes Yue Lora,    diphenoxylate-atropine (LOMOTIL) 2.5-0.025 MG per tablet Take 1 tablet by mouth in the morning, at noon, and at bedtime. Historical Provider, MD   metoprolol succinate (TOPROL XL) 25 MG extended release tablet Take 1 tablet by mouth daily 6/23/22   KRISHNA Carrizales - CNP   MAGNESIUM OXIDE 400 PO Take 400 mg by mouth in the morning and at bedtime     Historical Provider, MD   ferrous sulfate (FE TABS 325) 325 (65 Fe) MG EC tablet Take 325 mg by mouth daily (with breakfast)    Historical Provider, MD   Olaparib 100 MG TABS Take 200 mg by mouth in the morning and at bedtime    Historical Provider, MD   ALPELISIB, PROS,, 50 MG DOSE, PO Take 150 mg by mouth daily    Historical Provider, MD   collagenase (SANTYL) 250 UNIT/GM ointment Apply to ankle ulcers once daily, keep ulcers moist, keep nearby skin dry. 6/17/22   Ellen Dow MD   ELIQUIS 5 MG TABS tablet TAKE ONE TABLET BY MOUTH TWICE A DAY 5/23/22   KRISHNA Solomon - CNP   HYDROcodone-acetaminophen  MG TABS Take by mouth. Every 6 hrs- prn    Historical Provider, MD   escitalopram (LEXAPRO) 10 MG tablet Take 10 mg by mouth daily    Historical Provider, MD   morphine (MSIR) 15 MG tablet Take 30 mg by mouth 2 times daily. Historical Provider, MD   lidocaine-prilocaine (EMLA) 2.5-2.5 % cream Apply topically as needed for Pain Apply topically as needed. Historical Provider, MD   topiramate (TOPAMAX) 25 MG tablet Take 5 mg by mouth daily     Historical Provider, MD   acetaminophen (TYLENOL) 500 MG tablet Take 500 mg by mouth every 6 hours as needed for Pain    Historical Provider, MD   nitroGLYCERIN (NITROSTAT) 0.4 MG SL tablet up to max of 3 total doses.  If no relief after 1 dose, call 911. 4/29/20   Carlos Ormond L Heis, DO   gabapentin (NEURONTIN) 100 MG capsule Take 1 capsule by mouth 3 times daily for 90 days. 4/29/20 6/23/22  Gurwinder Davalos        Social history:  reports that she has been smoking cigarettes. She has been smoking an average of .5 packs per day. She has never used smokeless tobacco. She reports that she does not currently use alcohol. She reports that she does not use drugs. Family history:    Family History   Problem Relation Age of Onset    Heart Disease Mother     Heart Disease Father     High Blood Pressure Father     Stroke Paternal Grandmother          ROS  Review of Systems   Constitutional:  Positive for appetite change. Negative for activity change, fatigue and fever. HENT:  Negative for congestion and rhinorrhea. Respiratory:  Negative for cough, chest tightness and shortness of breath. Cardiovascular:  Negative for chest pain and leg swelling. Gastrointestinal:  Positive for abdominal pain, nausea and vomiting. Negative for diarrhea. Genitourinary:  Negative for dysuria, flank pain and pelvic pain. Musculoskeletal:  Negative for back pain and neck pain. Skin:  Negative for rash and wound. Neurological:  Negative for dizziness, light-headedness and headaches. Exam  Vitals:    08/08/22 1332 08/08/22 1502 08/08/22 1603 08/08/22 1633   BP: 117/66 103/60 102/68 108/71   Pulse: 65 71 62 67   Resp: 18 18 14 16   Temp:       TempSrc:       SpO2: 98% 100% 100% 100%   Weight:       Height:             Physical Exam  Vitals reviewed. Constitutional:       General: She is not in acute distress. Appearance: Normal appearance. She is not ill-appearing. Comments: Interactive, conversational, pleasant, in no acute cardiopulmonary distress     HENT:      Head: Normocephalic and atraumatic. Right Ear: External ear normal.      Left Ear: External ear normal.      Nose: Nose normal. No congestion. Mouth/Throat:      Mouth: Mucous membranes are moist.      Pharynx: Oropharynx is clear. Eyes:      General:         Right eye: No discharge. obstruction. 3. Increased moderate right pleural effusion with mild dependent atelectasis. No acute infiltrate. 4. Gallbladder hydrops. No significant biliary dilatation. 5. Mild anasarca.        Labs  Results for orders placed or performed during the hospital encounter of 08/08/22   COVID-19, Rapid    Specimen: Nasopharyngeal Swab   Result Value Ref Range    SARS-CoV-2, NAAT Not Detected Not Detected   CBC with Auto Differential   Result Value Ref Range    WBC 6.7 4.0 - 11.0 K/uL    RBC 3.31 (L) 4.00 - 5.20 M/uL    Hemoglobin 10.7 (L) 12.0 - 16.0 g/dL    Hematocrit 32.9 (L) 36.0 - 48.0 %    MCV 99.3 80.0 - 100.0 fL    MCH 32.2 26.0 - 34.0 pg    MCHC 32.5 31.0 - 36.0 g/dL    RDW 17.0 (H) 12.4 - 15.4 %    Platelets 106 527 - 727 K/uL    MPV 8.7 5.0 - 10.5 fL    Neutrophils % 79.0 %    Lymphocytes % 14.7 %    Monocytes % 6.2 %    Eosinophils % 0.0 %    Basophils % 0.1 %    Neutrophils Absolute 5.3 1.7 - 7.7 K/uL    Lymphocytes Absolute 1.0 1.0 - 5.1 K/uL    Monocytes Absolute 0.4 0.0 - 1.3 K/uL    Eosinophils Absolute 0.0 0.0 - 0.6 K/uL    Basophils Absolute 0.0 0.0 - 0.2 K/uL   Comprehensive Metabolic Panel w/ Reflex to MG   Result Value Ref Range    Sodium 141 136 - 145 mmol/L    Potassium reflex Magnesium 3.3 (L) 3.5 - 5.1 mmol/L    Chloride 101 99 - 110 mmol/L    CO2 26 21 - 32 mmol/L    Anion Gap 14 3 - 16    Glucose 93 70 - 99 mg/dL    BUN 16 7 - 20 mg/dL    Creatinine 0.7 0.6 - 1.2 mg/dL    GFR Non-African American >60 >60    GFR African American >60 >60    Calcium 9.7 8.3 - 10.6 mg/dL    Total Protein 6.7 6.4 - 8.2 g/dL    Albumin 3.4 3.4 - 5.0 g/dL    Albumin/Globulin Ratio 1.0 (L) 1.1 - 2.2    Total Bilirubin 0.3 0.0 - 1.0 mg/dL    Alkaline Phosphatase 98 40 - 129 U/L    ALT 11 10 - 40 U/L    AST 18 15 - 37 U/L   Lipase   Result Value Ref Range    Lipase 10.0 (L) 13.0 - 60.0 U/L   Magnesium   Result Value Ref Range    Magnesium 2.00 1.80 - 2.40 mg/dL       Procedures  Procedures          MDM  Patient seen and evaluated. Relevant records reviewed. - Patient is a 64 y.o. female with metastatic ovarian cancer, presenting with concerns of a mass, new to patient, and also nausea and vomiting with early satiety. Vital signs are stable today in the emergency department, she is interactive, and in no acute distress. Zofran 8 mg by EMS prior to arrival.    Laboratory evaluation today with chronic anemia, otherwise BUN and creatinine, lipase, AST and ALT all reassuring. CT abdomen does confirm a nodularity anterior to the liver, concerning for further metastasis. I discussed these results with patient and her grandson at length. Patient says she has upcoming oncological investigation into how recent course of chemotherapy had progressed, and she will follow-up with oncology. He had no vomiting or nausea today in the emergency department. Given IV fluids in the setting of decreased oral intake and a cancer patient. She says she feels much better, and would like to be discharged home. Patient currently has Phenergan at home for nausea, and would like to be discharged home with Zofran. I prescribed this. Otherwise, she has her pain medication as prescribed at home. Patient was instructed to return to the ED should they experience any concerning new or worsening symptoms. Instructed patient to follow up with their hem/onc and their PCP in 1-3 days or as soon as possible for follow up. Patient understands and agrees with the discharge plan, and I have answered all their questions at this time. Patient is stable for discharge home from the ED at this time.        Medications   ondansetron (ZOFRAN) injection 4 mg (4 mg IntraVENous Given 8/8/22 1332)   morphine sulfate (PF) injection 4 mg (4 mg IntraVENous Given 8/8/22 1332)   0.9 % sodium chloride bolus (0 mLs IntraVENous Stopped 8/8/22 1708)   iopamidol (ISOVUE-370) 76 % injection 75 mL (75 mLs IntraVENous Given 8/8/22 1420)       - I discussed the results, including any incidental findings, with patient. Questions answered. Patient/family agreeable to plan and express understanding of plan. Disposition:  Discharged home in stable condition. Is this patient to be included in the SEP-1 Core Measure due to severe sepsis or septic shock? No   Exclusion criteria - the patient is NOT to be included for SEP-1 Core Measure due to: Infection is not suspected      Clinical Impression:  1. Nausea and vomiting, intractability of vomiting not specified, unspecified vomiting type    2. Epigastric mass    3. Abdominal pain, epigastric        Blood pressure 108/71, pulse 67, temperature 98 °F (36.7 °C), temperature source Oral, resp. rate 16, height 5' 8\" (1.727 m), weight 130 lb (59 kg), SpO2 100 %, not currently breastfeeding. Patient was given prescriptions for the following medications. I counseled patient how to take these medications. Discharge Medication List as of 8/8/2022  5:00 PM        START taking these medications    Details   ondansetron (ZOFRAN ODT) 4 MG disintegrating tablet Take 1 tablet by mouth every 8 hours as needed for Nausea or Vomiting, Disp-36 tablet, R-[id number]             Disposition referral (if applicable):  [facility]  ED  [address]    If symptoms worsen, As needed    Joanna Vigil MD  [address] [phone]    Schedule an appointment as soon as possible for a visit             I, Yue Lora DO, am the primary attending of record and contributed the majority of evaluation and treatment of emergent care for this encounter. This chart was generated in part by using Dragon Dictation system and may contain errors related to that system including errors in grammar, punctuation, and spelling, as well as words and phrases that may be inappropriate.  If there are any questions or concerns please feel free to contact the dictating provider for clarification.      ROBERTO MCKEON, [address], DO  08/11/22 [id number]

## 2022-08-12 ENCOUNTER — HOSPITAL ENCOUNTER (OUTPATIENT)
Dept: WOUND CARE | Age: 62
Discharge: HOME | End: 2022-08-12

## 2022-08-12 DIAGNOSIS — L89.514 PRESSURE ULCER OF RIGHT ANKLE, STAGE 4 (HCC): ICD-10-CM

## 2022-08-12 DIAGNOSIS — L89.523 PRESSURE ULCER OF LEFT ANKLE, STAGE 3 (HCC): ICD-10-CM

## 2022-08-12 DIAGNOSIS — R60.0 EDEMA OF LOWER EXTREMITY: Primary | ICD-10-CM

## 2022-08-22 ENCOUNTER — HOSPITAL ENCOUNTER (OUTPATIENT)
Dept: CT IMAGING | Age: 62
Discharge: HOME | End: 2022-08-22
Payer: COMMERCIAL

## 2022-08-22 DIAGNOSIS — C56.2 OVARIAN CANCER ON LEFT (HCC): ICD-10-CM

## 2022-08-22 DIAGNOSIS — Z00.6 EXAMINATION OF PARTICIPANT IN CLINICAL TRIAL: ICD-10-CM

## 2022-08-22 PROCEDURE — 6360000004 HC RX CONTRAST MEDICATION: Performed by: OBSTETRICS & GYNECOLOGY

## 2022-08-22 PROCEDURE — 71260 CT THORAX DX C+: CPT

## 2022-08-22 RX ADMIN — IOHEXOL 50 ML: 240 INJECTION, SOLUTION INTRATHECAL; INTRAVASCULAR; INTRAVENOUS; ORAL at 14:18

## 2022-08-22 RX ADMIN — IOPAMIDOL 75 ML: 755 INJECTION, SOLUTION INTRAVENOUS at 14:18

## 2022-08-31 ENCOUNTER — HOSPITAL ENCOUNTER (OUTPATIENT)
Dept: WOMENS IMAGING | Age: 62
Discharge: HOME | End: 2022-08-31
Payer: COMMERCIAL

## 2022-08-31 VITALS — WEIGHT: 127 LBS | BODY MASS INDEX: 19.25 KG/M2 | HEIGHT: 68 IN

## 2022-08-31 DIAGNOSIS — Z12.31 VISIT FOR SCREENING MAMMOGRAM: ICD-10-CM

## 2022-08-31 PROCEDURE — 77067 SCR MAMMO BI INCL CAD: CPT

## 2022-09-15 ENCOUNTER — HOSPITAL ENCOUNTER (OUTPATIENT)
Dept: WOMENS IMAGING | Age: 62
Discharge: HOME | End: 2022-09-15
Payer: COMMERCIAL

## 2022-09-15 DIAGNOSIS — R92.8 ABNORMAL MAMMOGRAM: ICD-10-CM

## 2022-09-15 PROCEDURE — G0279 TOMOSYNTHESIS, MAMMO: HCPCS

## 2022-09-15 PROCEDURE — 76642 ULTRASOUND BREAST LIMITED: CPT

## 2025-02-04 NOTE — PROGRESS NOTES
Assumed care of pt. She's A&O x 4, resting in bed. She's currently rating her pain at a 4/10 to the R hip. She declines medication intervention at this time, but does have an ice pack in place. She continues to report having some dizziness, but says that it's not as bad as it had been. Silver mepilex dressing to the R hip is dry and intact. Assessment as charted, VSS. Incentive spirometry encouraged. Pt. reports the urge to urinate, but is not quite ready to get up at this time. She also mentioned that the last time she did urinate, she felt as though she was unable to completely empty her bladder. Heat packs were applied to her lower abdomen to help relax pelvic muscles. She denies further needs at this time. Call light is within reach, bed alarm activated.   Pt admitted to ARU. Room 3112. Alert and oriented ands call light in place.

## (undated) DEVICE — SUTURE ABSORBABLE BRAIDED 0 CT-1 8X27 IN UD VICRYL JJ41G

## (undated) DEVICE — PLATE ES AD W 9FT CRD 2

## (undated) DEVICE — STERILE POLYISOPRENE POWDER-FREE SURGICAL GLOVES WITH EMOLLIENT COATING: Brand: PROTEXIS

## (undated) DEVICE — ELECTRO LUBE IS A SINGLE PATIENT USE DEVICE THAT IS INTENDED TO BE USED ON ELECTROSURGICAL ELECTRODES TO REDUCE STICKING.: Brand: KEY SURGICAL ELECTRO LUBE

## (undated) DEVICE — SUTURE VCRL SZ 4-0 L18IN ABSRB UD L19MM PS-2 3/8 CIR PRIM J496H

## (undated) DEVICE — SOLUTION ANTIFOG VIS SYS CLEARIFY LAPSCP

## (undated) DEVICE — NEEDLE EPI 18GA L3.5IN S STL MOD TUOHY PNT THN WALL W/O WNG

## (undated) DEVICE — PICO 7 10CM X 30CM: Brand: PICO™ 7

## (undated) DEVICE — SUTURE VCRL + SZ 3-0 L18IN ABSRB UD SH 1/2 CIR TAPERCUT NDL VCP864D

## (undated) DEVICE — DRAIN SURG 15FR SIL RND CHN W/ TRCR FULL FLUT DBL WRP TRAD

## (undated) DEVICE — BLADELESS OBTURATOR: Brand: WECK VISTA

## (undated) DEVICE — TRAY CATHETER 16FR F INCLUDE BARDX IC COMPLT CARE DRNGE BG

## (undated) DEVICE — STANDARD HYPODERMIC NEEDLE,POLYPROPYLENE HUB: Brand: MONOJECT

## (undated) DEVICE — SYRINGE MED 10ML POLYPR LUERSLIP TIP FLAT TOP W/O SFTY DISP

## (undated) DEVICE — TIP COVER ACCESSORY

## (undated) DEVICE — SYRINGE MED 5ML STD CLR PLAS LUERLOCK TIP N CTRL DISP

## (undated) DEVICE — DRAPE,T,LAPARO,TRANS,STERILE: Brand: MEDLINE

## (undated) DEVICE — Device

## (undated) DEVICE — 40519 ADULT HEAD REST: Brand: 40519 ADULT HEAD REST

## (undated) DEVICE — SUTURE PROL SZ 2-0 L30IN NONABSORBABLE BLU L26MM CT-2 1/2 8411H

## (undated) DEVICE — TOWEL,OR,DSP,ST,GREEN,DLX,XR,4/PK,20PK/C: Brand: MEDLINE

## (undated) DEVICE — SUTURE PDS II SZ 1 L96IN ABSRB VLT TP-1 L65MM 1/2 CIR Z880G

## (undated) DEVICE — SUTURE VCRL SZ 0 L36IN ABSRB UD L36MM CT-1 1/2 CIR J946H

## (undated) DEVICE — MINOR SET UP: Brand: MEDLINE INDUSTRIES, INC.

## (undated) DEVICE — CHLORAPREP 26ML ORANGE

## (undated) DEVICE — JEWISH HOSPITAL TURNOVER KIT: Brand: MEDLINE INDUSTRIES, INC.

## (undated) DEVICE — SYSTEM SMK EVAC LAP TBNG FILTER HSNG BENT STYL PNK SEE CLR

## (undated) DEVICE — 3M™ TEGADERM™ TRANSPARENT FILM DRESSING FRAME STYLE, 1626W, 4 IN X 4-3/4 IN (10 CM X 12 CM), 50/CT 4CT/CASE: Brand: 3M™ TEGADERM™

## (undated) DEVICE — TRAP SPEC COLL 40CC MUCOUS CALIB UNIV CONN FOR OPN SUCT

## (undated) DEVICE — ELECTROSURGICAL PENCIL ROCKER SWITCH NON COATED BLADE ELECTRODE 10 FT (3 M) CORD HOLSTER: Brand: MEGADYNE

## (undated) DEVICE — PUMP SUC IRR TBNG L10FT W/ HNDPC ASSEMB STRYKEFLOW 2

## (undated) DEVICE — SUTURE MCRYL + SZ 4-0 L18IN ABSRB UD L19MM PS-2 3/8 CIR MCP496G

## (undated) DEVICE — CANNULA SEAL

## (undated) DEVICE — DRAPE,LAP,CHOLE,W/TROUGHS,STERILE: Brand: MEDLINE

## (undated) DEVICE — [HIGH FLOW INSUFFLATOR,  DO NOT USE IF PACKAGE IS DAMAGED,  KEEP DRY,  KEEP AWAY FROM SUNLIGHT,  PROTECT FROM HEAT AND RADIOACTIVE SOURCES.]: Brand: PNEUMOSURE

## (undated) DEVICE — GARMENT,MEDLINE,DVT,INT,CALF,MED, GEN2: Brand: MEDLINE

## (undated) DEVICE — SURGICAL SET UP - SURE SET: Brand: MEDLINE INDUSTRIES, INC.

## (undated) DEVICE — APPLICATOR MEDICATED 26 CC SOLUTION HI LT ORNG CHLORAPREP

## (undated) DEVICE — VCARE MEDIUM, UTERINE MANIPULATOR, VAGINAL-CERVICAL-AHLUWALIA'S-RETRACTOR-ELEVATOR: Brand: VCARE

## (undated) DEVICE — GOWN,SIRUS,POLYRNF,BRTHSLV,XL,30/CS: Brand: MEDLINE

## (undated) DEVICE — ARM DRAPE

## (undated) DEVICE — SUTURE PERMAHAND SZ 3-0 L30IN NONABSORBABLE BLK SH L26MM K832H

## (undated) DEVICE — 3M™ IOBAN™ 2 ANTIMICROBIAL INCISE DRAPE 6648EZ: Brand: IOBAN™ 2

## (undated) DEVICE — SOLUTION IV IRRIG WATER 1000ML POUR BRL 2F7114

## (undated) DEVICE — BARRIER, ABSORBABLE, ADHESION: Brand: SEPRAFILM®

## (undated) DEVICE — SUTURE VCRL SZ 0 L27IN ABSRB UD L36MM CT-1 1/2 CIR J260H

## (undated) DEVICE — ADHESIVE SKIN CLSR 0.7ML TOP DERMBND ADV

## (undated) DEVICE — PAD MATERNITY CURITY ADH STRIP DISP

## (undated) DEVICE — SUTURE ETHLN SZ 3-0 L18IN NONABSORBABLE BLK FS-1 L24MM 3/8 663H

## (undated) DEVICE — GLOVE ORANGE PI 7   MSG9070

## (undated) DEVICE — ELECTRODE BLDE L6.5IN CAUT EXT DISP

## (undated) DEVICE — SUTURE VCRL SZ 3-0 L27IN ABSRB UD L26MM SH 1/2 CIR J416H

## (undated) DEVICE — SEALER ENDOSCP NANO COAT OPN DIV CRV L JAW LIGASURE IMPACT

## (undated) DEVICE — SUTURE VCRL SZ 3-0 L18IN ABSRB UD L26MM SH 1/2 CIR J864D

## (undated) DEVICE — SPONGE,LAP,18"X18",DLX,XR,ST,5/PK,40/PK: Brand: MEDLINE

## (undated) DEVICE — TRAY PREP DRY W/ PREM GLV 2 APPL 6 SPNG 2 UNDPD 1 OVERWRAP

## (undated) DEVICE — NEEDLE INSUF L150MM DIA2MM DISP FOR PNEUMOPERI ENDOPATH

## (undated) DEVICE — SOLUTION IV 1000ML 0.9% SOD CHL

## (undated) DEVICE — SURE SET-DOUBLE BASIN-LF: Brand: MEDLINE INDUSTRIES, INC.

## (undated) DEVICE — SPONGE,LAP,4"X18",XR,ST,5/PK,40PK/CS: Brand: MEDLINE INDUSTRIES, INC.

## (undated) DEVICE — COLUMN DRAPE

## (undated) DEVICE — PACK LAP IV REINF TBL CVR ADH UTIL UNDERBUTTOCK DRP W CUF